# Patient Record
Sex: FEMALE | Race: WHITE | Employment: OTHER | URBAN - METROPOLITAN AREA
[De-identification: names, ages, dates, MRNs, and addresses within clinical notes are randomized per-mention and may not be internally consistent; named-entity substitution may affect disease eponyms.]

---

## 2018-03-03 ENCOUNTER — OFFICE VISIT (OUTPATIENT)
Dept: FAMILY MEDICINE CLINIC | Age: 73
End: 2018-03-03
Payer: MEDICARE

## 2018-03-03 VITALS
TEMPERATURE: 98.4 F | HEART RATE: 58 BPM | BODY MASS INDEX: 26.87 KG/M2 | WEIGHT: 146 LBS | OXYGEN SATURATION: 98 % | SYSTOLIC BLOOD PRESSURE: 122 MMHG | HEIGHT: 62 IN | DIASTOLIC BLOOD PRESSURE: 60 MMHG

## 2018-03-03 DIAGNOSIS — R30.0 DYSURIA: Primary | ICD-10-CM

## 2018-03-03 DIAGNOSIS — N30.91 HEMATURIA DUE TO CYSTITIS: ICD-10-CM

## 2018-03-03 LAB
BILIRUBIN, POC: ABNORMAL
BLOOD URINE, POC: ABNORMAL
CLARITY, POC: ABNORMAL
COLOR, POC: YELLOW
GLUCOSE URINE, POC: ABNORMAL
KETONES, POC: ABNORMAL
LEUKOCYTE EST, POC: ABNORMAL
NITRITE, POC: ABNORMAL
PH, POC: 6
PROTEIN, POC: 0.15
SPECIFIC GRAVITY, POC: 1.02
UROBILINOGEN, POC: ABNORMAL

## 2018-03-03 PROCEDURE — 81003 URINALYSIS AUTO W/O SCOPE: CPT | Performed by: NURSE PRACTITIONER

## 2018-03-03 PROCEDURE — 99212 OFFICE O/P EST SF 10 MIN: CPT | Performed by: NURSE PRACTITIONER

## 2018-03-03 RX ORDER — PHENAZOPYRIDINE HYDROCHLORIDE 100 MG/1
100 TABLET, FILM COATED ORAL 3 TIMES DAILY PRN
Qty: 9 TABLET | Refills: 0 | Status: SHIPPED | OUTPATIENT
Start: 2018-03-03 | End: 2018-04-09 | Stop reason: ALTCHOICE

## 2018-03-03 RX ORDER — CIPROFLOXACIN 250 MG/1
250 TABLET, FILM COATED ORAL 2 TIMES DAILY
Qty: 14 TABLET | Refills: 0 | Status: SHIPPED | OUTPATIENT
Start: 2018-03-03 | End: 2018-03-10

## 2018-03-03 ASSESSMENT — ENCOUNTER SYMPTOMS
EYE REDNESS: 0
SHORTNESS OF BREATH: 0
SINUS PRESSURE: 0
WHEEZING: 0
SORE THROAT: 0
NAUSEA: 0
EYE DISCHARGE: 0
BACK PAIN: 0
COUGH: 0
DIARRHEA: 0
ABDOMINAL PAIN: 0
FACIAL SWELLING: 0
CONSTIPATION: 0
ABDOMINAL DISTENTION: 0
CHEST TIGHTNESS: 0

## 2018-03-06 LAB
ORGANISM: ABNORMAL
URINE CULTURE, ROUTINE: ABNORMAL
URINE CULTURE, ROUTINE: ABNORMAL

## 2018-03-20 ENCOUNTER — OFFICE VISIT (OUTPATIENT)
Dept: UROLOGY | Age: 73
End: 2018-03-20
Payer: MEDICARE

## 2018-03-20 VITALS
DIASTOLIC BLOOD PRESSURE: 84 MMHG | WEIGHT: 143 LBS | BODY MASS INDEX: 27 KG/M2 | HEIGHT: 61 IN | SYSTOLIC BLOOD PRESSURE: 134 MMHG | HEART RATE: 53 BPM

## 2018-03-20 DIAGNOSIS — N39.0 RECURRENT UTI: Primary | ICD-10-CM

## 2018-03-20 DIAGNOSIS — R33.9 URINARY RETENTION: ICD-10-CM

## 2018-03-20 LAB
BILIRUBIN, POC: ABNORMAL
BLOOD URINE, POC: ABNORMAL
CLARITY, POC: CLEAR
COLOR, POC: YELLOW
GLUCOSE URINE, POC: ABNORMAL
KETONES, POC: ABNORMAL
LEUKOCYTE EST, POC: ABNORMAL
NITRITE, POC: ABNORMAL
PH, POC: 6.5
POST VOID RESIDUAL (PVR): 34 ML
PROTEIN, POC: ABNORMAL
SPECIFIC GRAVITY, POC: >=1.03
UROBILINOGEN, POC: 0.2

## 2018-03-20 PROCEDURE — G8399 PT W/DXA RESULTS DOCUMENT: HCPCS | Performed by: UROLOGY

## 2018-03-20 PROCEDURE — 99203 OFFICE O/P NEW LOW 30 MIN: CPT | Performed by: UROLOGY

## 2018-03-20 PROCEDURE — 3014F SCREEN MAMMO DOC REV: CPT | Performed by: UROLOGY

## 2018-03-20 PROCEDURE — 3017F COLORECTAL CA SCREEN DOC REV: CPT | Performed by: UROLOGY

## 2018-03-20 PROCEDURE — 1090F PRES/ABSN URINE INCON ASSESS: CPT | Performed by: UROLOGY

## 2018-03-20 PROCEDURE — 1123F ACP DISCUSS/DSCN MKR DOCD: CPT | Performed by: UROLOGY

## 2018-03-20 PROCEDURE — G8427 DOCREV CUR MEDS BY ELIG CLIN: HCPCS | Performed by: UROLOGY

## 2018-03-20 PROCEDURE — 1036F TOBACCO NON-USER: CPT | Performed by: UROLOGY

## 2018-03-20 PROCEDURE — 81003 URINALYSIS AUTO W/O SCOPE: CPT | Performed by: UROLOGY

## 2018-03-20 PROCEDURE — 4040F PNEUMOC VAC/ADMIN/RCVD: CPT | Performed by: UROLOGY

## 2018-03-20 PROCEDURE — G8484 FLU IMMUNIZE NO ADMIN: HCPCS | Performed by: UROLOGY

## 2018-03-20 PROCEDURE — G8419 CALC BMI OUT NRM PARAM NOF/U: HCPCS | Performed by: UROLOGY

## 2018-03-20 PROCEDURE — 51798 US URINE CAPACITY MEASURE: CPT | Performed by: UROLOGY

## 2018-03-20 RX ORDER — SULFAMETHOXAZOLE AND TRIMETHOPRIM 800; 160 MG/1; MG/1
1 TABLET ORAL 2 TIMES DAILY
Qty: 20 TABLET | Refills: 0 | Status: SHIPPED | OUTPATIENT
Start: 2018-03-20 | End: 2018-03-30

## 2018-03-20 RX ORDER — SULFAMETHOXAZOLE AND TRIMETHOPRIM 800; 160 MG/1; MG/1
TABLET ORAL
Refills: 0 | COMMUNITY
Start: 2018-03-15 | End: 2018-04-09 | Stop reason: ALTCHOICE

## 2018-03-20 ASSESSMENT — PATIENT HEALTH QUESTIONNAIRE - PHQ9
1. LITTLE INTEREST OR PLEASURE IN DOING THINGS: 0
2. FEELING DOWN, DEPRESSED OR HOPELESS: 0
SUM OF ALL RESPONSES TO PHQ9 QUESTIONS 1 & 2: 0
SUM OF ALL RESPONSES TO PHQ QUESTIONS 1-9: 0

## 2018-03-22 LAB — URINE CULTURE, ROUTINE: NORMAL

## 2018-04-09 ENCOUNTER — HOSPITAL ENCOUNTER (OUTPATIENT)
Age: 73
Setting detail: OBSERVATION
Discharge: HOME | End: 2018-04-10
Attending: INTERNAL MEDICINE | Admitting: INTERNAL MEDICINE
Payer: MEDICARE

## 2018-04-09 ENCOUNTER — APPOINTMENT (OUTPATIENT)
Dept: CT IMAGING | Age: 73
End: 2018-04-09
Payer: MEDICARE

## 2018-04-09 DIAGNOSIS — K62.5 RECTAL BLEEDING: Primary | ICD-10-CM

## 2018-04-09 DIAGNOSIS — K52.9 COLITIS: ICD-10-CM

## 2018-04-09 LAB
ABO/RH: NORMAL
ALBUMIN SERPL-MCNC: 4.3 G/DL (ref 3.9–4.9)
ALP BLD-CCNC: 78 U/L (ref 40–130)
ALT SERPL-CCNC: 16 U/L (ref 0–33)
ANION GAP SERPL CALCULATED.3IONS-SCNC: 14 MEQ/L (ref 7–13)
ANTIBODY SCREEN: NORMAL
APTT: 22.9 SEC (ref 21.6–35.4)
AST SERPL-CCNC: 17 U/L (ref 0–35)
BASOPHILS ABSOLUTE: 0 K/UL (ref 0–0.2)
BASOPHILS RELATIVE PERCENT: 0.5 %
BILIRUB SERPL-MCNC: 1 MG/DL (ref 0–1.2)
BILIRUBIN URINE: NEGATIVE
BLOOD, URINE: NEGATIVE
BUN BLDV-MCNC: 17 MG/DL (ref 8–23)
CALCIUM SERPL-MCNC: 9.2 MG/DL (ref 8.6–10.2)
CHLORIDE BLD-SCNC: 97 MEQ/L (ref 98–107)
CLARITY: CLEAR
CO2: 29 MEQ/L (ref 22–29)
COLOR: YELLOW
CREAT SERPL-MCNC: 0.69 MG/DL (ref 0.5–0.9)
EOSINOPHILS ABSOLUTE: 0.1 K/UL (ref 0–0.7)
EOSINOPHILS RELATIVE PERCENT: 1 %
GFR AFRICAN AMERICAN: >60
GFR AFRICAN AMERICAN: >60
GFR NON-AFRICAN AMERICAN: >60
GFR NON-AFRICAN AMERICAN: >60
GLOBULIN: 2.5 G/DL (ref 2.3–3.5)
GLUCOSE BLD-MCNC: 144 MG/DL (ref 74–109)
GLUCOSE URINE: NEGATIVE MG/DL
HCT VFR BLD CALC: 40.7 % (ref 37–47)
HCT VFR BLD CALC: 42.1 % (ref 37–47)
HEMOGLOBIN: 13.7 G/DL (ref 12–16)
HEMOGLOBIN: 14.6 G/DL (ref 12–16)
INR BLD: 1
KETONES, URINE: NEGATIVE MG/DL
LACTIC ACID: 1.6 MMOL/L (ref 0.5–2.2)
LEUKOCYTE ESTERASE, URINE: NEGATIVE
LIPASE: 25 U/L (ref 13–60)
LYMPHOCYTES ABSOLUTE: 1.3 K/UL (ref 1–4.8)
LYMPHOCYTES RELATIVE PERCENT: 13.8 %
MCH RBC QN AUTO: 31.6 PG (ref 27–31.3)
MCHC RBC AUTO-ENTMCNC: 34.8 % (ref 33–37)
MCV RBC AUTO: 90.9 FL (ref 82–100)
MONOCYTES ABSOLUTE: 0.6 K/UL (ref 0.2–0.8)
MONOCYTES RELATIVE PERCENT: 6 %
NEUTROPHILS ABSOLUTE: 7.2 K/UL (ref 1.4–6.5)
NEUTROPHILS RELATIVE PERCENT: 78.7 %
NITRITE, URINE: NEGATIVE
PDW BLD-RTO: 13.4 % (ref 11.5–14.5)
PERFORMED ON: NORMAL
PH UA: 7.5 (ref 5–9)
PLATELET # BLD: 234 K/UL (ref 130–400)
POC CREATININE: 0.9 MG/DL (ref 0.6–1.2)
POC SAMPLE TYPE: NORMAL
POTASSIUM SERPL-SCNC: 3.4 MEQ/L (ref 3.5–5.1)
PROTEIN UA: NEGATIVE MG/DL
PROTHROMBIN TIME: 10.3 SEC (ref 8.1–13.7)
RBC # BLD: 4.63 M/UL (ref 4.2–5.4)
SODIUM BLD-SCNC: 140 MEQ/L (ref 132–144)
SPECIFIC GRAVITY UA: 1.06 (ref 1–1.03)
TOTAL PROTEIN: 6.8 G/DL (ref 6.4–8.1)
URINE REFLEX TO CULTURE: NORMAL
UROBILINOGEN, URINE: 0.2 E.U./DL
WBC # BLD: 9.1 K/UL (ref 4.8–10.8)

## 2018-04-09 PROCEDURE — 6360000002 HC RX W HCPCS: Performed by: PHYSICIAN ASSISTANT

## 2018-04-09 PROCEDURE — 96376 TX/PRO/DX INJ SAME DRUG ADON: CPT

## 2018-04-09 PROCEDURE — 86850 RBC ANTIBODY SCREEN: CPT

## 2018-04-09 PROCEDURE — 2500000003 HC RX 250 WO HCPCS: Performed by: PHYSICIAN ASSISTANT

## 2018-04-09 PROCEDURE — 81003 URINALYSIS AUTO W/O SCOPE: CPT

## 2018-04-09 PROCEDURE — 85610 PROTHROMBIN TIME: CPT

## 2018-04-09 PROCEDURE — 86901 BLOOD TYPING SEROLOGIC RH(D): CPT

## 2018-04-09 PROCEDURE — 96375 TX/PRO/DX INJ NEW DRUG ADDON: CPT

## 2018-04-09 PROCEDURE — 96366 THER/PROPH/DIAG IV INF ADDON: CPT

## 2018-04-09 PROCEDURE — 96365 THER/PROPH/DIAG IV INF INIT: CPT

## 2018-04-09 PROCEDURE — 85014 HEMATOCRIT: CPT

## 2018-04-09 PROCEDURE — G0378 HOSPITAL OBSERVATION PER HR: HCPCS

## 2018-04-09 PROCEDURE — 83690 ASSAY OF LIPASE: CPT

## 2018-04-09 PROCEDURE — 74177 CT ABD & PELVIS W/CONTRAST: CPT

## 2018-04-09 PROCEDURE — 6370000000 HC RX 637 (ALT 250 FOR IP): Performed by: INTERNAL MEDICINE

## 2018-04-09 PROCEDURE — 85018 HEMOGLOBIN: CPT

## 2018-04-09 PROCEDURE — 2580000003 HC RX 258: Performed by: SPECIALIST

## 2018-04-09 PROCEDURE — C9113 INJ PANTOPRAZOLE SODIUM, VIA: HCPCS | Performed by: PHYSICIAN ASSISTANT

## 2018-04-09 PROCEDURE — 2580000003 HC RX 258: Performed by: PHYSICIAN ASSISTANT

## 2018-04-09 PROCEDURE — 85730 THROMBOPLASTIN TIME PARTIAL: CPT

## 2018-04-09 PROCEDURE — 6370000000 HC RX 637 (ALT 250 FOR IP): Performed by: PHYSICIAN ASSISTANT

## 2018-04-09 PROCEDURE — 83605 ASSAY OF LACTIC ACID: CPT

## 2018-04-09 PROCEDURE — 6370000000 HC RX 637 (ALT 250 FOR IP): Performed by: SPECIALIST

## 2018-04-09 PROCEDURE — 6360000004 HC RX CONTRAST MEDICATION: Performed by: PHYSICIAN ASSISTANT

## 2018-04-09 PROCEDURE — 80053 COMPREHEN METABOLIC PANEL: CPT

## 2018-04-09 PROCEDURE — 96367 TX/PROPH/DG ADDL SEQ IV INF: CPT

## 2018-04-09 PROCEDURE — 99285 EMERGENCY DEPT VISIT HI MDM: CPT

## 2018-04-09 PROCEDURE — 85025 COMPLETE CBC W/AUTO DIFF WBC: CPT

## 2018-04-09 PROCEDURE — 86900 BLOOD TYPING SEROLOGIC ABO: CPT

## 2018-04-09 PROCEDURE — 36415 COLL VENOUS BLD VENIPUNCTURE: CPT

## 2018-04-09 RX ORDER — SODIUM CHLORIDE 0.9 % (FLUSH) 0.9 %
10 SYRINGE (ML) INJECTION EVERY 12 HOURS SCHEDULED
Status: DISCONTINUED | OUTPATIENT
Start: 2018-04-09 | End: 2018-04-10 | Stop reason: HOSPADM

## 2018-04-09 RX ORDER — HYDROCHLOROTHIAZIDE 12.5 MG/1
12.5 TABLET ORAL DAILY
Status: DISCONTINUED | OUTPATIENT
Start: 2018-04-09 | End: 2018-04-10 | Stop reason: HOSPADM

## 2018-04-09 RX ORDER — ACETAMINOPHEN 325 MG/1
650 TABLET ORAL EVERY 4 HOURS PRN
Status: DISCONTINUED | OUTPATIENT
Start: 2018-04-09 | End: 2018-04-10 | Stop reason: HOSPADM

## 2018-04-09 RX ORDER — METOPROLOL TARTRATE 50 MG/1
50 TABLET, FILM COATED ORAL 2 TIMES DAILY
Status: DISCONTINUED | OUTPATIENT
Start: 2018-04-09 | End: 2018-04-10 | Stop reason: HOSPADM

## 2018-04-09 RX ORDER — PRAVASTATIN SODIUM 20 MG
20 TABLET ORAL DAILY
Status: DISCONTINUED | OUTPATIENT
Start: 2018-04-09 | End: 2018-04-10 | Stop reason: HOSPADM

## 2018-04-09 RX ORDER — CIPROFLOXACIN 2 MG/ML
400 INJECTION, SOLUTION INTRAVENOUS ONCE
Status: DISCONTINUED | OUTPATIENT
Start: 2018-04-09 | End: 2018-04-10 | Stop reason: HOSPADM

## 2018-04-09 RX ORDER — SODIUM CHLORIDE 9 MG/ML
INJECTION, SOLUTION INTRAVENOUS CONTINUOUS
Status: DISCONTINUED | OUTPATIENT
Start: 2018-04-09 | End: 2018-04-10 | Stop reason: HOSPADM

## 2018-04-09 RX ORDER — CIPROFLOXACIN 2 MG/ML
400 INJECTION, SOLUTION INTRAVENOUS EVERY 12 HOURS
Status: DISCONTINUED | OUTPATIENT
Start: 2018-04-09 | End: 2018-04-10 | Stop reason: HOSPADM

## 2018-04-09 RX ORDER — MULTIVIT-MIN/IRON/FOLIC ACID/K 18-600-40
CAPSULE ORAL
COMMUNITY

## 2018-04-09 RX ORDER — SODIUM CHLORIDE 0.9 % (FLUSH) 0.9 %
10 SYRINGE (ML) INJECTION ONCE
Status: COMPLETED | OUTPATIENT
Start: 2018-04-09 | End: 2018-04-09

## 2018-04-09 RX ORDER — 0.9 % SODIUM CHLORIDE 0.9 %
10 VIAL (ML) INJECTION EVERY 12 HOURS SCHEDULED
Status: DISCONTINUED | OUTPATIENT
Start: 2018-04-09 | End: 2018-04-10 | Stop reason: HOSPADM

## 2018-04-09 RX ORDER — POTASSIUM BICARBONATE 25 MEQ/1
25 TABLET, EFFERVESCENT ORAL ONCE
Status: COMPLETED | OUTPATIENT
Start: 2018-04-09 | End: 2018-04-09

## 2018-04-09 RX ORDER — SODIUM CHLORIDE 0.9 % (FLUSH) 0.9 %
10 SYRINGE (ML) INJECTION PRN
Status: DISCONTINUED | OUTPATIENT
Start: 2018-04-09 | End: 2018-04-10 | Stop reason: HOSPADM

## 2018-04-09 RX ORDER — PANTOPRAZOLE SODIUM 40 MG/10ML
80 INJECTION, POWDER, LYOPHILIZED, FOR SOLUTION INTRAVENOUS DAILY
Status: DISCONTINUED | OUTPATIENT
Start: 2018-04-09 | End: 2018-04-09 | Stop reason: DRUGHIGH

## 2018-04-09 RX ORDER — METHYLPREDNISOLONE SODIUM SUCCINATE 40 MG/ML
40 INJECTION, POWDER, LYOPHILIZED, FOR SOLUTION INTRAMUSCULAR; INTRAVENOUS ONCE
Status: COMPLETED | OUTPATIENT
Start: 2018-04-09 | End: 2018-04-09

## 2018-04-09 RX ORDER — 0.9 % SODIUM CHLORIDE 0.9 %
10 VIAL (ML) INJECTION PRN
Status: DISCONTINUED | OUTPATIENT
Start: 2018-04-09 | End: 2018-04-10 | Stop reason: HOSPADM

## 2018-04-09 RX ORDER — ONDANSETRON 2 MG/ML
4 INJECTION INTRAMUSCULAR; INTRAVENOUS EVERY 6 HOURS PRN
Status: DISCONTINUED | OUTPATIENT
Start: 2018-04-09 | End: 2018-04-10 | Stop reason: HOSPADM

## 2018-04-09 RX ORDER — DILTIAZEM HYDROCHLORIDE 240 MG/1
240 CAPSULE, COATED, EXTENDED RELEASE ORAL DAILY
Status: DISCONTINUED | OUTPATIENT
Start: 2018-04-09 | End: 2018-04-10 | Stop reason: HOSPADM

## 2018-04-09 RX ORDER — SODIUM CHLORIDE 9 MG/ML
INJECTION, SOLUTION INTRAVENOUS CONTINUOUS
Status: DISCONTINUED | OUTPATIENT
Start: 2018-04-09 | End: 2018-04-10

## 2018-04-09 RX ADMIN — SODIUM CHLORIDE, PRESERVATIVE FREE 10 ML: 5 INJECTION INTRAVENOUS at 08:00

## 2018-04-09 RX ADMIN — ONDANSETRON HYDROCHLORIDE 4 MG: 2 INJECTION, SOLUTION INTRAMUSCULAR; INTRAVENOUS at 13:01

## 2018-04-09 RX ADMIN — SODIUM CHLORIDE: 9 INJECTION, SOLUTION INTRAVENOUS at 11:49

## 2018-04-09 RX ADMIN — SODIUM CHLORIDE: 9 INJECTION, SOLUTION INTRAVENOUS at 09:07

## 2018-04-09 RX ADMIN — IOPAMIDOL 100 ML: 755 INJECTION, SOLUTION INTRAVENOUS at 08:00

## 2018-04-09 RX ADMIN — CIPROFLOXACIN 400 MG: 2 INJECTION, SOLUTION INTRAVENOUS at 11:49

## 2018-04-09 RX ADMIN — PANTOPRAZOLE SODIUM 80 MG: 40 INJECTION, POWDER, FOR SOLUTION INTRAVENOUS at 07:46

## 2018-04-09 RX ADMIN — PRAVASTATIN SODIUM 20 MG: 20 TABLET ORAL at 21:00

## 2018-04-09 RX ADMIN — METOPROLOL TARTRATE 50 MG: 50 TABLET ORAL at 21:00

## 2018-04-09 RX ADMIN — DILTIAZEM HYDROCHLORIDE 240 MG: 240 CAPSULE, COATED, EXTENDED RELEASE ORAL at 21:00

## 2018-04-09 RX ADMIN — POLYETHYLENE GLYCOL-3350 AND ELECTROLYTES 2000 ML: 236; 6.74; 5.86; 2.97; 22.74 POWDER, FOR SOLUTION ORAL at 20:59

## 2018-04-09 RX ADMIN — METRONIDAZOLE 500 MG: 500 INJECTION, SOLUTION INTRAVENOUS at 09:07

## 2018-04-09 RX ADMIN — METHYLPREDNISOLONE SODIUM SUCCINATE 40 MG: 40 INJECTION, POWDER, FOR SOLUTION INTRAMUSCULAR; INTRAVENOUS at 11:50

## 2018-04-09 RX ADMIN — METRONIDAZOLE 500 MG: 500 INJECTION, SOLUTION INTRAVENOUS at 16:33

## 2018-04-09 RX ADMIN — Medication 10 ML: at 21:01

## 2018-04-09 RX ADMIN — PANTOPRAZOLE SODIUM 40 MG: 40 INJECTION, POWDER, FOR SOLUTION INTRAVENOUS at 11:50

## 2018-04-09 RX ADMIN — POTASSIUM BICARBONATE 25 MEQ: 25 TABLET, EFFERVESCENT ORAL at 11:53

## 2018-04-09 ASSESSMENT — ENCOUNTER SYMPTOMS
BLOOD IN STOOL: 1
COLOR CHANGE: 0
RHINORRHEA: 0
NAUSEA: 0
ABDOMINAL DISTENTION: 0
SHORTNESS OF BREATH: 0
COUGH: 0
EYE DISCHARGE: 0
VOMITING: 0
WHEEZING: 0
SORE THROAT: 0
CONSTIPATION: 0
STRIDOR: 0
ABDOMINAL PAIN: 1
DIARRHEA: 0
BACK PAIN: 0
RECTAL PAIN: 0

## 2018-04-09 ASSESSMENT — PAIN SCALES - GENERAL
PAINLEVEL_OUTOF10: 3
PAINLEVEL_OUTOF10: 6
PAINLEVEL_OUTOF10: 2

## 2018-04-09 ASSESSMENT — PAIN DESCRIPTION - PAIN TYPE
TYPE: ACUTE PAIN

## 2018-04-09 ASSESSMENT — PAIN DESCRIPTION - DESCRIPTORS
DESCRIPTORS: CRAMPING;DISCOMFORT;SQUEEZING
DESCRIPTORS: CRAMPING;DISCOMFORT

## 2018-04-09 ASSESSMENT — PAIN DESCRIPTION - ORIENTATION
ORIENTATION: LOWER
ORIENTATION: MID;LOWER

## 2018-04-09 ASSESSMENT — PAIN DESCRIPTION - ONSET
ONSET: ON-GOING
ONSET: SUDDEN

## 2018-04-09 ASSESSMENT — PAIN DESCRIPTION - FREQUENCY
FREQUENCY: CONTINUOUS
FREQUENCY: CONTINUOUS

## 2018-04-09 ASSESSMENT — PAIN DESCRIPTION - LOCATION
LOCATION: ABDOMEN
LOCATION: ABDOMEN

## 2018-04-10 ENCOUNTER — ANESTHESIA EVENT (OUTPATIENT)
Dept: OPERATING ROOM | Age: 73
End: 2018-04-10
Payer: MEDICARE

## 2018-04-10 ENCOUNTER — ANESTHESIA (OUTPATIENT)
Dept: OPERATING ROOM | Age: 73
End: 2018-04-10
Payer: MEDICARE

## 2018-04-10 VITALS
TEMPERATURE: 98.1 F | OXYGEN SATURATION: 99 % | SYSTOLIC BLOOD PRESSURE: 141 MMHG | BODY MASS INDEX: 27.75 KG/M2 | HEIGHT: 61 IN | HEART RATE: 67 BPM | RESPIRATION RATE: 17 BRPM | DIASTOLIC BLOOD PRESSURE: 57 MMHG | WEIGHT: 147 LBS

## 2018-04-10 VITALS
OXYGEN SATURATION: 100 % | DIASTOLIC BLOOD PRESSURE: 67 MMHG | RESPIRATION RATE: 20 BRPM | SYSTOLIC BLOOD PRESSURE: 157 MMHG

## 2018-04-10 LAB
ANION GAP SERPL CALCULATED.3IONS-SCNC: 11 MEQ/L (ref 7–13)
BASOPHILS ABSOLUTE: 0 K/UL (ref 0–0.2)
BASOPHILS RELATIVE PERCENT: 0.2 %
BUN BLDV-MCNC: 6 MG/DL (ref 8–23)
CALCIUM SERPL-MCNC: 8.1 MG/DL (ref 8.6–10.2)
CHLORIDE BLD-SCNC: 104 MEQ/L (ref 98–107)
CO2: 27 MEQ/L (ref 22–29)
CREAT SERPL-MCNC: 0.53 MG/DL (ref 0.5–0.9)
EOSINOPHILS ABSOLUTE: 0.1 K/UL (ref 0–0.7)
EOSINOPHILS RELATIVE PERCENT: 0.6 %
GFR AFRICAN AMERICAN: >60
GFR NON-AFRICAN AMERICAN: >60
GLUCOSE BLD-MCNC: 102 MG/DL (ref 74–109)
HCT VFR BLD CALC: 35.1 % (ref 37–47)
HEMOGLOBIN: 12.3 G/DL (ref 12–16)
LYMPHOCYTES ABSOLUTE: 1.2 K/UL (ref 1–4.8)
LYMPHOCYTES RELATIVE PERCENT: 13.4 %
MAGNESIUM: 1.8 MG/DL (ref 1.7–2.3)
MCH RBC QN AUTO: 32 PG (ref 27–31.3)
MCHC RBC AUTO-ENTMCNC: 35.1 % (ref 33–37)
MCV RBC AUTO: 91.2 FL (ref 82–100)
MONOCYTES ABSOLUTE: 0.7 K/UL (ref 0.2–0.8)
MONOCYTES RELATIVE PERCENT: 7.4 %
NEUTROPHILS ABSOLUTE: 7.2 K/UL (ref 1.4–6.5)
NEUTROPHILS RELATIVE PERCENT: 78.4 %
PDW BLD-RTO: 13.6 % (ref 11.5–14.5)
PLATELET # BLD: 197 K/UL (ref 130–400)
POTASSIUM REFLEX MAGNESIUM: 3.3 MEQ/L (ref 3.5–5.1)
RBC # BLD: 3.85 M/UL (ref 4.2–5.4)
SODIUM BLD-SCNC: 142 MEQ/L (ref 132–144)
WBC # BLD: 9.2 K/UL (ref 4.8–10.8)

## 2018-04-10 PROCEDURE — 2580000003 HC RX 258: Performed by: SPECIALIST

## 2018-04-10 PROCEDURE — 3700000001 HC ADD 15 MINUTES (ANESTHESIA): Performed by: SPECIALIST

## 2018-04-10 PROCEDURE — 6370000000 HC RX 637 (ALT 250 FOR IP): Performed by: SPECIALIST

## 2018-04-10 PROCEDURE — 83735 ASSAY OF MAGNESIUM: CPT

## 2018-04-10 PROCEDURE — 2500000003 HC RX 250 WO HCPCS: Performed by: NURSE ANESTHETIST, CERTIFIED REGISTERED

## 2018-04-10 PROCEDURE — 6370000000 HC RX 637 (ALT 250 FOR IP): Performed by: INTERNAL MEDICINE

## 2018-04-10 PROCEDURE — 96366 THER/PROPH/DIAG IV INF ADDON: CPT

## 2018-04-10 PROCEDURE — 3609027000 HC COLONOSCOPY: Performed by: SPECIALIST

## 2018-04-10 PROCEDURE — 96376 TX/PRO/DX INJ SAME DRUG ADON: CPT

## 2018-04-10 PROCEDURE — 80048 BASIC METABOLIC PNL TOTAL CA: CPT

## 2018-04-10 PROCEDURE — 6360000002 HC RX W HCPCS: Performed by: PHYSICIAN ASSISTANT

## 2018-04-10 PROCEDURE — 7100000000 HC PACU RECOVERY - FIRST 15 MIN: Performed by: SPECIALIST

## 2018-04-10 PROCEDURE — 7100000001 HC PACU RECOVERY - ADDTL 15 MIN: Performed by: SPECIALIST

## 2018-04-10 PROCEDURE — 2500000003 HC RX 250 WO HCPCS: Performed by: PHYSICIAN ASSISTANT

## 2018-04-10 PROCEDURE — 3700000000 HC ANESTHESIA ATTENDED CARE: Performed by: SPECIALIST

## 2018-04-10 PROCEDURE — 2580000003 HC RX 258: Performed by: INTERNAL MEDICINE

## 2018-04-10 PROCEDURE — 2580000003 HC RX 258: Performed by: PHYSICIAN ASSISTANT

## 2018-04-10 PROCEDURE — G0378 HOSPITAL OBSERVATION PER HR: HCPCS

## 2018-04-10 PROCEDURE — C9113 INJ PANTOPRAZOLE SODIUM, VIA: HCPCS | Performed by: PHYSICIAN ASSISTANT

## 2018-04-10 PROCEDURE — 6360000002 HC RX W HCPCS: Performed by: NURSE ANESTHETIST, CERTIFIED REGISTERED

## 2018-04-10 PROCEDURE — 85025 COMPLETE CBC W/AUTO DIFF WBC: CPT

## 2018-04-10 PROCEDURE — 87493 C DIFF AMPLIFIED PROBE: CPT

## 2018-04-10 PROCEDURE — 36415 COLL VENOUS BLD VENIPUNCTURE: CPT

## 2018-04-10 RX ORDER — LIDOCAINE HYDROCHLORIDE 20 MG/ML
INJECTION, SOLUTION INFILTRATION; PERINEURAL PRN
Status: DISCONTINUED | OUTPATIENT
Start: 2018-04-10 | End: 2018-04-10 | Stop reason: SDUPTHER

## 2018-04-10 RX ORDER — ONDANSETRON 2 MG/ML
4 INJECTION INTRAMUSCULAR; INTRAVENOUS
Status: DISCONTINUED | OUTPATIENT
Start: 2018-04-10 | End: 2018-04-10 | Stop reason: HOSPADM

## 2018-04-10 RX ORDER — PANTOPRAZOLE SODIUM 40 MG/10ML
40 INJECTION, POWDER, LYOPHILIZED, FOR SOLUTION INTRAVENOUS 2 TIMES DAILY
Status: DISCONTINUED | OUTPATIENT
Start: 2018-04-10 | End: 2018-04-10 | Stop reason: HOSPADM

## 2018-04-10 RX ORDER — 0.9 % SODIUM CHLORIDE 0.9 %
10 VIAL (ML) INJECTION DAILY
Status: DISCONTINUED | OUTPATIENT
Start: 2018-04-10 | End: 2018-04-10 | Stop reason: HOSPADM

## 2018-04-10 RX ORDER — CIPROFLOXACIN 500 MG/1
500 TABLET, FILM COATED ORAL 2 TIMES DAILY
Qty: 20 TABLET | Refills: 0 | Status: SHIPPED | OUTPATIENT
Start: 2018-04-10 | End: 2018-04-20

## 2018-04-10 RX ORDER — METRONIDAZOLE 500 MG/1
500 TABLET ORAL 3 TIMES DAILY
Qty: 30 TABLET | Refills: 0 | Status: SHIPPED | OUTPATIENT
Start: 2018-04-10 | End: 2018-04-10

## 2018-04-10 RX ORDER — POTASSIUM CHLORIDE 7.45 MG/ML
10 INJECTION INTRAVENOUS
Status: DISCONTINUED | OUTPATIENT
Start: 2018-04-10 | End: 2018-04-10

## 2018-04-10 RX ORDER — POTASSIUM CHLORIDE 20 MEQ/1
40 TABLET, EXTENDED RELEASE ORAL ONCE
Status: COMPLETED | OUTPATIENT
Start: 2018-04-10 | End: 2018-04-10

## 2018-04-10 RX ORDER — PROPOFOL 10 MG/ML
INJECTION, EMULSION INTRAVENOUS PRN
Status: DISCONTINUED | OUTPATIENT
Start: 2018-04-10 | End: 2018-04-10 | Stop reason: SDUPTHER

## 2018-04-10 RX ORDER — CIPROFLOXACIN 500 MG/1
500 TABLET, FILM COATED ORAL 2 TIMES DAILY
Qty: 20 TABLET | Refills: 0 | Status: SHIPPED | OUTPATIENT
Start: 2018-04-10 | End: 2018-04-10

## 2018-04-10 RX ORDER — METRONIDAZOLE 500 MG/1
500 TABLET ORAL 3 TIMES DAILY
Qty: 30 TABLET | Refills: 0 | Status: SHIPPED | OUTPATIENT
Start: 2018-04-10 | End: 2018-04-20

## 2018-04-10 RX ADMIN — SODIUM CHLORIDE: 9 INJECTION, SOLUTION INTRAVENOUS at 02:03

## 2018-04-10 RX ADMIN — PROPOFOL 20 MG: 10 INJECTION, EMULSION INTRAVENOUS at 13:18

## 2018-04-10 RX ADMIN — CIPROFLOXACIN 400 MG: 2 INJECTION, SOLUTION INTRAVENOUS at 02:03

## 2018-04-10 RX ADMIN — PROPOFOL 20 MG: 10 INJECTION, EMULSION INTRAVENOUS at 13:16

## 2018-04-10 RX ADMIN — PROPOFOL 40 MG: 10 INJECTION, EMULSION INTRAVENOUS at 13:08

## 2018-04-10 RX ADMIN — PROPOFOL 20 MG: 10 INJECTION, EMULSION INTRAVENOUS at 13:14

## 2018-04-10 RX ADMIN — METOPROLOL TARTRATE 50 MG: 50 TABLET ORAL at 08:32

## 2018-04-10 RX ADMIN — CIPROFLOXACIN 400 MG: 2 INJECTION, SOLUTION INTRAVENOUS at 15:04

## 2018-04-10 RX ADMIN — HYDROCHLOROTHIAZIDE 12.5 MG: 12.5 TABLET ORAL at 15:04

## 2018-04-10 RX ADMIN — PROPOFOL 20 MG: 10 INJECTION, EMULSION INTRAVENOUS at 13:10

## 2018-04-10 RX ADMIN — SODIUM CHLORIDE: 9 INJECTION, SOLUTION INTRAVENOUS at 13:06

## 2018-04-10 RX ADMIN — Medication 10 ML: at 08:37

## 2018-04-10 RX ADMIN — PANTOPRAZOLE SODIUM 40 MG: 40 INJECTION, POWDER, FOR SOLUTION INTRAVENOUS at 08:31

## 2018-04-10 RX ADMIN — Medication 10 ML: at 08:31

## 2018-04-10 RX ADMIN — ONDANSETRON HYDROCHLORIDE 4 MG: 2 INJECTION, SOLUTION INTRAMUSCULAR; INTRAVENOUS at 00:22

## 2018-04-10 RX ADMIN — METRONIDAZOLE 500 MG: 500 INJECTION, SOLUTION INTRAVENOUS at 11:38

## 2018-04-10 RX ADMIN — METRONIDAZOLE 500 MG: 500 INJECTION, SOLUTION INTRAVENOUS at 03:58

## 2018-04-10 RX ADMIN — POTASSIUM CHLORIDE 40 MEQ: 20 TABLET, EXTENDED RELEASE ORAL at 14:11

## 2018-04-10 RX ADMIN — Medication 10 ML: at 00:51

## 2018-04-10 RX ADMIN — PANTOPRAZOLE SODIUM 40 MG: 40 INJECTION, POWDER, FOR SOLUTION INTRAVENOUS at 00:50

## 2018-04-10 RX ADMIN — LIDOCAINE HYDROCHLORIDE 20 MG: 20 INJECTION, SOLUTION INFILTRATION; PERINEURAL at 13:08

## 2018-04-10 RX ADMIN — PROPOFOL 20 MG: 10 INJECTION, EMULSION INTRAVENOUS at 13:12

## 2018-04-10 RX ADMIN — PROPOFOL 20 MG: 10 INJECTION, EMULSION INTRAVENOUS at 13:20

## 2018-04-10 RX ADMIN — POLYETHYLENE GLYCOL-3350 AND ELECTROLYTES 2000 ML: 236; 6.74; 5.86; 2.97; 22.74 POWDER, FOR SOLUTION ORAL at 06:39

## 2018-04-10 ASSESSMENT — PAIN SCALES - GENERAL
PAINLEVEL_OUTOF10: 0
PAINLEVEL_OUTOF10: 0

## 2018-04-10 ASSESSMENT — PAIN - FUNCTIONAL ASSESSMENT: PAIN_FUNCTIONAL_ASSESSMENT: 0-10

## 2018-04-10 ASSESSMENT — PULMONARY FUNCTION TESTS
PIF_VALUE: 1

## 2018-04-11 ENCOUNTER — TELEPHONE (OUTPATIENT)
Dept: UROLOGY | Age: 73
End: 2018-04-11

## 2018-04-11 LAB — CLOSTRIDIUM DIFFICILE DNA AMPLIFICATION: NORMAL

## 2018-04-17 ENCOUNTER — OFFICE VISIT (OUTPATIENT)
Dept: UROLOGY | Age: 73
End: 2018-04-17
Payer: MEDICARE

## 2018-04-17 VITALS
HEIGHT: 60 IN | DIASTOLIC BLOOD PRESSURE: 64 MMHG | BODY MASS INDEX: 28.07 KG/M2 | SYSTOLIC BLOOD PRESSURE: 120 MMHG | HEART RATE: 56 BPM | WEIGHT: 143 LBS

## 2018-04-17 DIAGNOSIS — R31.29 MICROHEMATURIA: Primary | ICD-10-CM

## 2018-04-17 LAB
BILIRUBIN, POC: ABNORMAL
BLOOD URINE, POC: ABNORMAL
CLARITY, POC: CLEAR
COLOR, POC: YELLOW
GLUCOSE URINE, POC: ABNORMAL
KETONES, POC: ABNORMAL
LEUKOCYTE EST, POC: ABNORMAL
NITRITE, POC: POSITIVE
PH, POC: 6
PROTEIN, POC: ABNORMAL
SPECIFIC GRAVITY, POC: >=1.03
UROBILINOGEN, POC: 0.2

## 2018-04-17 PROCEDURE — 1090F PRES/ABSN URINE INCON ASSESS: CPT | Performed by: UROLOGY

## 2018-04-17 PROCEDURE — 4040F PNEUMOC VAC/ADMIN/RCVD: CPT | Performed by: UROLOGY

## 2018-04-17 PROCEDURE — 1036F TOBACCO NON-USER: CPT | Performed by: UROLOGY

## 2018-04-17 PROCEDURE — G8427 DOCREV CUR MEDS BY ELIG CLIN: HCPCS | Performed by: UROLOGY

## 2018-04-17 PROCEDURE — 1123F ACP DISCUSS/DSCN MKR DOCD: CPT | Performed by: UROLOGY

## 2018-04-17 PROCEDURE — 3017F COLORECTAL CA SCREEN DOC REV: CPT | Performed by: UROLOGY

## 2018-04-17 PROCEDURE — 81003 URINALYSIS AUTO W/O SCOPE: CPT | Performed by: UROLOGY

## 2018-04-17 PROCEDURE — G8399 PT W/DXA RESULTS DOCUMENT: HCPCS | Performed by: UROLOGY

## 2018-04-17 PROCEDURE — 3014F SCREEN MAMMO DOC REV: CPT | Performed by: UROLOGY

## 2018-04-17 PROCEDURE — 99212 OFFICE O/P EST SF 10 MIN: CPT | Performed by: UROLOGY

## 2018-04-17 PROCEDURE — G8419 CALC BMI OUT NRM PARAM NOF/U: HCPCS | Performed by: UROLOGY

## 2018-04-17 RX ORDER — ONDANSETRON 4 MG/1
TABLET, ORALLY DISINTEGRATING ORAL
Refills: 0 | COMMUNITY
Start: 2018-04-13 | End: 2018-09-27

## 2018-04-19 LAB — URINE CULTURE, ROUTINE: NORMAL

## 2018-05-02 ENCOUNTER — NURSE ONLY (OUTPATIENT)
Dept: UROLOGY | Age: 73
End: 2018-05-02
Payer: MEDICARE

## 2018-05-02 ENCOUNTER — TELEPHONE (OUTPATIENT)
Dept: UROLOGY | Age: 73
End: 2018-05-02

## 2018-05-02 DIAGNOSIS — R31.0 GROSS HEMATURIA: Primary | ICD-10-CM

## 2018-05-02 LAB
BILIRUBIN, POC: ABNORMAL
BLOOD URINE, POC: ABNORMAL
CLARITY, POC: CLEAR
COLOR, POC: ABNORMAL
GLUCOSE URINE, POC: ABNORMAL
KETONES, POC: 15
LEUKOCYTE EST, POC: ABNORMAL
NITRITE, POC: POSITIVE
PH, POC: 6
PROTEIN, POC: ABNORMAL
SPECIFIC GRAVITY, POC: 1.02
UROBILINOGEN, POC: 1

## 2018-05-02 PROCEDURE — 81003 URINALYSIS AUTO W/O SCOPE: CPT | Performed by: UROLOGY

## 2018-05-04 LAB — URINE CULTURE, ROUTINE: NORMAL

## 2018-05-18 ENCOUNTER — OFFICE VISIT (OUTPATIENT)
Dept: UROLOGY | Age: 73
End: 2018-05-18
Payer: MEDICARE

## 2018-05-18 VITALS
HEART RATE: 51 BPM | BODY MASS INDEX: 25.86 KG/M2 | DIASTOLIC BLOOD PRESSURE: 60 MMHG | SYSTOLIC BLOOD PRESSURE: 122 MMHG | HEIGHT: 61 IN | WEIGHT: 137 LBS

## 2018-05-18 DIAGNOSIS — R31.29 MICROHEMATURIA: Primary | ICD-10-CM

## 2018-05-18 LAB
BILIRUBIN, POC: ABNORMAL
BLOOD URINE, POC: ABNORMAL
CLARITY, POC: CLEAR
COLOR, POC: YELLOW
GLUCOSE URINE, POC: ABNORMAL
KETONES, POC: ABNORMAL
LEUKOCYTE EST, POC: ABNORMAL
NITRITE, POC: ABNORMAL
PH, POC: 6.5
PROTEIN, POC: ABNORMAL
SPECIFIC GRAVITY, POC: 1.02
UROBILINOGEN, POC: 0.2

## 2018-05-18 PROCEDURE — 99214 OFFICE O/P EST MOD 30 MIN: CPT | Performed by: UROLOGY

## 2018-05-18 PROCEDURE — G8419 CALC BMI OUT NRM PARAM NOF/U: HCPCS | Performed by: UROLOGY

## 2018-05-18 PROCEDURE — 1123F ACP DISCUSS/DSCN MKR DOCD: CPT | Performed by: UROLOGY

## 2018-05-18 PROCEDURE — 1090F PRES/ABSN URINE INCON ASSESS: CPT | Performed by: UROLOGY

## 2018-05-18 PROCEDURE — 1036F TOBACCO NON-USER: CPT | Performed by: UROLOGY

## 2018-05-18 PROCEDURE — 81003 URINALYSIS AUTO W/O SCOPE: CPT | Performed by: UROLOGY

## 2018-05-18 PROCEDURE — G8399 PT W/DXA RESULTS DOCUMENT: HCPCS | Performed by: UROLOGY

## 2018-05-18 PROCEDURE — G8427 DOCREV CUR MEDS BY ELIG CLIN: HCPCS | Performed by: UROLOGY

## 2018-05-18 PROCEDURE — 4040F PNEUMOC VAC/ADMIN/RCVD: CPT | Performed by: UROLOGY

## 2018-05-18 PROCEDURE — 3017F COLORECTAL CA SCREEN DOC REV: CPT | Performed by: UROLOGY

## 2018-05-18 RX ORDER — POTASSIUM CHLORIDE 750 MG/1
TABLET, EXTENDED RELEASE ORAL
Refills: 1 | COMMUNITY
Start: 2018-04-20 | End: 2018-09-27

## 2018-05-18 RX ORDER — SULFAMETHOXAZOLE AND TRIMETHOPRIM 800; 160 MG/1; MG/1
1 TABLET ORAL 2 TIMES DAILY
Qty: 60 TABLET | Refills: 0 | Status: SHIPPED | OUTPATIENT
Start: 2018-05-18 | End: 2018-06-01

## 2018-06-01 ENCOUNTER — PROCEDURE VISIT (OUTPATIENT)
Dept: UROLOGY | Age: 73
End: 2018-06-01

## 2018-06-01 ENCOUNTER — TELEPHONE (OUTPATIENT)
Dept: UROLOGY | Age: 73
End: 2018-06-01

## 2018-06-01 VITALS
HEART RATE: 63 BPM | WEIGHT: 137 LBS | SYSTOLIC BLOOD PRESSURE: 120 MMHG | DIASTOLIC BLOOD PRESSURE: 66 MMHG | BODY MASS INDEX: 25.86 KG/M2 | HEIGHT: 61 IN

## 2018-06-01 DIAGNOSIS — N39.0 RECURRENT UTI: Primary | ICD-10-CM

## 2018-06-01 DIAGNOSIS — N39.0 RECURRENT UTI: ICD-10-CM

## 2018-06-01 DIAGNOSIS — R31.29 MICROHEMATURIA: Primary | ICD-10-CM

## 2018-06-04 LAB
ORGANISM: ABNORMAL
URINE CULTURE, ROUTINE: ABNORMAL
URINE CULTURE, ROUTINE: ABNORMAL

## 2018-06-04 RX ORDER — CIPROFLOXACIN 500 MG/1
500 TABLET, FILM COATED ORAL 2 TIMES DAILY
Qty: 14 TABLET | Refills: 0 | Status: SHIPPED | OUTPATIENT
Start: 2018-06-04 | End: 2018-08-16

## 2018-06-12 ENCOUNTER — TELEPHONE (OUTPATIENT)
Dept: UROLOGY | Age: 73
End: 2018-06-12

## 2018-08-06 ENCOUNTER — TELEPHONE (OUTPATIENT)
Dept: UROLOGY | Age: 73
End: 2018-08-06

## 2018-08-06 ENCOUNTER — NURSE ONLY (OUTPATIENT)
Dept: UROLOGY | Age: 73
End: 2018-08-06
Payer: MEDICARE

## 2018-08-06 DIAGNOSIS — R30.0 DYSURIA: ICD-10-CM

## 2018-08-06 DIAGNOSIS — R30.0 DYSURIA: Primary | ICD-10-CM

## 2018-08-06 LAB
BILIRUBIN, POC: ABNORMAL
BLOOD URINE, POC: ABNORMAL
CLARITY, POC: ABNORMAL
COLOR, POC: YELLOW
GLUCOSE URINE, POC: ABNORMAL
KETONES, POC: ABNORMAL
LEUKOCYTE EST, POC: ABNORMAL
NITRITE, POC: POSITIVE
PH, POC: 6
PROTEIN, POC: ABNORMAL
SPECIFIC GRAVITY, POC: 1.02
UROBILINOGEN, POC: 0.2

## 2018-08-06 PROCEDURE — 81003 URINALYSIS AUTO W/O SCOPE: CPT | Performed by: UROLOGY

## 2018-08-06 RX ORDER — SULFAMETHOXAZOLE AND TRIMETHOPRIM 800; 160 MG/1; MG/1
1 TABLET ORAL 2 TIMES DAILY
Qty: 20 TABLET | Refills: 0 | Status: SHIPPED | OUTPATIENT
Start: 2018-08-06 | End: 2018-08-16

## 2018-08-09 LAB
ORGANISM: ABNORMAL
URINE CULTURE, ROUTINE: ABNORMAL
URINE CULTURE, ROUTINE: ABNORMAL

## 2018-08-16 ENCOUNTER — PROCEDURE VISIT (OUTPATIENT)
Dept: UROLOGY | Age: 73
End: 2018-08-16
Payer: MEDICARE

## 2018-08-16 VITALS
HEART RATE: 54 BPM | HEIGHT: 60 IN | DIASTOLIC BLOOD PRESSURE: 60 MMHG | BODY MASS INDEX: 26.7 KG/M2 | WEIGHT: 136 LBS | SYSTOLIC BLOOD PRESSURE: 138 MMHG

## 2018-08-16 DIAGNOSIS — N81.9 FEMALE GENITAL PROLAPSE, UNSPECIFIED TYPE: ICD-10-CM

## 2018-08-16 DIAGNOSIS — R31.29 MICROHEMATURIA: Primary | ICD-10-CM

## 2018-08-16 DIAGNOSIS — N39.0 RECURRENT UTI: ICD-10-CM

## 2018-08-16 LAB
BILIRUBIN, POC: ABNORMAL
BLOOD URINE, POC: ABNORMAL
CLARITY, POC: CLEAR
COLOR, POC: YELLOW
GLUCOSE URINE, POC: ABNORMAL
KETONES, POC: ABNORMAL
LEUKOCYTE EST, POC: ABNORMAL
NITRITE, POC: ABNORMAL
PH, POC: 7
PROTEIN, POC: ABNORMAL
SPECIFIC GRAVITY, POC: 1.02
UROBILINOGEN, POC: 0.2

## 2018-08-16 PROCEDURE — 81003 URINALYSIS AUTO W/O SCOPE: CPT | Performed by: UROLOGY

## 2018-08-16 PROCEDURE — 52000 CYSTOURETHROSCOPY: CPT | Performed by: UROLOGY

## 2018-08-16 PROCEDURE — 99999 PR OFFICE/OUTPT VISIT,PROCEDURE ONLY: CPT | Performed by: UROLOGY

## 2018-08-24 ENCOUNTER — OFFICE VISIT (OUTPATIENT)
Dept: OBGYN CLINIC | Age: 73
End: 2018-08-24
Payer: MEDICARE

## 2018-08-24 VITALS
HEART RATE: 52 BPM | SYSTOLIC BLOOD PRESSURE: 136 MMHG | BODY MASS INDEX: 26.31 KG/M2 | DIASTOLIC BLOOD PRESSURE: 70 MMHG | WEIGHT: 134 LBS | HEIGHT: 60 IN

## 2018-08-24 DIAGNOSIS — N81.3 COMPLETE UTERINE PROLAPSE: Primary | ICD-10-CM

## 2018-08-24 DIAGNOSIS — N39.0 RECURRENT UTI: ICD-10-CM

## 2018-08-24 DIAGNOSIS — N81.6 BADEN-WALKER GRADE 2 RECTOCELE: ICD-10-CM

## 2018-08-24 DIAGNOSIS — N81.10 BADEN-WALKER GRADE 4 CYSTOCELE: ICD-10-CM

## 2018-08-24 PROCEDURE — 1123F ACP DISCUSS/DSCN MKR DOCD: CPT | Performed by: OBSTETRICS & GYNECOLOGY

## 2018-08-24 PROCEDURE — 1036F TOBACCO NON-USER: CPT | Performed by: OBSTETRICS & GYNECOLOGY

## 2018-08-24 PROCEDURE — 99203 OFFICE O/P NEW LOW 30 MIN: CPT | Performed by: OBSTETRICS & GYNECOLOGY

## 2018-08-24 PROCEDURE — G8399 PT W/DXA RESULTS DOCUMENT: HCPCS | Performed by: OBSTETRICS & GYNECOLOGY

## 2018-08-24 PROCEDURE — G8427 DOCREV CUR MEDS BY ELIG CLIN: HCPCS | Performed by: OBSTETRICS & GYNECOLOGY

## 2018-08-24 PROCEDURE — G8419 CALC BMI OUT NRM PARAM NOF/U: HCPCS | Performed by: OBSTETRICS & GYNECOLOGY

## 2018-08-24 PROCEDURE — 1101F PT FALLS ASSESS-DOCD LE1/YR: CPT | Performed by: OBSTETRICS & GYNECOLOGY

## 2018-08-24 PROCEDURE — 3017F COLORECTAL CA SCREEN DOC REV: CPT | Performed by: OBSTETRICS & GYNECOLOGY

## 2018-08-24 PROCEDURE — 1090F PRES/ABSN URINE INCON ASSESS: CPT | Performed by: OBSTETRICS & GYNECOLOGY

## 2018-08-24 PROCEDURE — 4040F PNEUMOC VAC/ADMIN/RCVD: CPT | Performed by: OBSTETRICS & GYNECOLOGY

## 2018-08-24 NOTE — Clinical Note
Schedule for : Laparoscopic hysterectomy with possible USLS/ SSLS , A-P repair with prophylactic TOT , Obtryx mesh from [address] [place] [address].

## 2018-08-24 NOTE — PROGRESS NOTES
Yamile Colmenares is a 74 y.o. female who presents here today for complaints of recurrent UTI . Underwent cystoscopy and was referred by Dr Joyce Márquez for evaluation. Patient with history of uterine prolapse. Patient feels pressure between legs. Vitals:  /70 (Site: Right Arm, Position: Sitting, Cuff Size: Medium Adult)   Pulse 52   Ht 5' (1.524 m)   Wt 134 lb (60.8 kg)   BMI 26.17 kg/m²   Allergies:  Latex  Past Medical History:   Diagnosis Date    Hyperlipidemia     Hypertension     Osteoarthritis     Prolapsed uterus      Past Surgical History:   Procedure Laterality Date    BREAST SURGERY      FRACTURE SURGERY      [place] COLONOSCOPY W/BIOPSY SINGLE/MULTIPLE N/A 4/10/2018    COLONOSCOPY WITH BIOPSYS performed by Joni Jeffrey MD at [facility]     OB History     No data available        Family History   Problem Relation Age of Onset    High Blood Pressure Mother     Asthma Brother      Social History     Social History    Marital status:      Spouse name: N/A    Number of children: N/A    Years of education: N/A     Occupational History    Not on file. Social History Main Topics    Smoking status: Never Smoker    Smokeless tobacco: Never Used    Alcohol use No    Drug use: No    Sexual activity: Not on file      Comment:      Other Topics Concern    Not on file     Social History Narrative    No narrative on file       Contraceptive method:  none    Patient's medications, allergies, past medical, surgical, social and family histories were reviewed and updated as appropriate. Review of Systems  As per chief complaint   All other systems reviewed and are negative. Physical Exam:  Vitals:  /70 (Site: Right Arm, Position: Sitting, Cuff Size: Medium Adult)   Pulse 52   Ht 5' (1.524 m)   Wt 134 lb (60.8 kg)   BMI 26.17 kg/m²   Lungs: CTAB   Heart : Regular S1/S2, no M/R/G  Abdomen: Soft , NT, ND , + BS   Pelvic exam :    Diagnosis Orders   1.  Complete uterine prolapse     2. Gran[place]-Walker grade 4 cystocele     3. [place]-Walker grade 2 rectocele     4. Recurrent UTI       + urine with coughing and valsalva after reduction of uterine prolapse. Assessment:      Diagnosis Orders   1. Complete uterine prolapse     2. Granville-Walker grade 4 cystocele     3. [place]-Walker grade 2 rectocele     4. Recurrent UTI         Plan:     Discussed pessary vs surgery . Patient wants surgery . To be scheduled for Laparoscopic hysterectomy with possible USLS/ SSLS , A-P repair with prophylactic TOT , Obtryx mesh from [address]. Discussed risks of failiure, infection , bleeding and injury of internal organs. As well as risks related to the mesh , including erosion and chronic pain. All questions answered. Patient to be scheduled after obtaining medical clearance from PCP on Monday . No orders of the defined types were placed in this encounter. No orders of the defined types were placed in this encounter. Follow Up:  No Follow-up on file.         Gisella Childs MD

## 2018-09-27 ENCOUNTER — HOSPITAL ENCOUNTER (OUTPATIENT)
Dept: PREADMISSION TESTING | Age: 73
Discharge: HOME | End: 2018-10-01
Payer: MEDICARE

## 2018-09-27 VITALS
HEART RATE: 49 BPM | BODY MASS INDEX: 26.93 KG/M2 | TEMPERATURE: 98.4 F | DIASTOLIC BLOOD PRESSURE: 69 MMHG | OXYGEN SATURATION: 96 % | HEIGHT: 60 IN | WEIGHT: 137.2 LBS | SYSTOLIC BLOOD PRESSURE: 159 MMHG | RESPIRATION RATE: 16 BRPM

## 2018-09-27 LAB
ABO/RH: NORMAL
ANION GAP SERPL CALCULATED.3IONS-SCNC: 14 MEQ/L (ref 7–13)
ANTIBODY SCREEN: NORMAL
BUN BLDV-MCNC: 13 MG/DL (ref 8–23)
CALCIUM SERPL-MCNC: 9.9 MG/DL (ref 8.6–10.2)
CHLORIDE BLD-SCNC: 98 MEQ/L (ref 98–107)
CO2: 29 MEQ/L (ref 22–29)
CREAT SERPL-MCNC: 0.65 MG/DL (ref 0.5–0.9)
EKG ATRIAL RATE: 48 BPM
EKG P AXIS: 43 DEGREES
EKG P-R INTERVAL: 168 MS
EKG Q-T INTERVAL: 444 MS
EKG QRS DURATION: 92 MS
EKG QTC CALCULATION (BAZETT): 396 MS
EKG R AXIS: 25 DEGREES
EKG T AXIS: 51 DEGREES
EKG VENTRICULAR RATE: 48 BPM
GFR AFRICAN AMERICAN: >60
GFR NON-AFRICAN AMERICAN: >60
GLUCOSE BLD-MCNC: 95 MG/DL (ref 74–109)
HCT VFR BLD CALC: 43.8 % (ref 37–47)
HEMOGLOBIN: 15.1 G/DL (ref 12–16)
MCH RBC QN AUTO: 31.7 PG (ref 27–31.3)
MCHC RBC AUTO-ENTMCNC: 34.5 % (ref 33–37)
MCV RBC AUTO: 91.8 FL (ref 82–100)
PDW BLD-RTO: 13.6 % (ref 11.5–14.5)
PLATELET # BLD: 276 K/UL (ref 130–400)
POTASSIUM SERPL-SCNC: 3.8 MEQ/L (ref 3.5–5.1)
RBC # BLD: 4.77 M/UL (ref 4.2–5.4)
SODIUM BLD-SCNC: 141 MEQ/L (ref 132–144)
WBC # BLD: 6.6 K/UL (ref 4.8–10.8)

## 2018-09-27 PROCEDURE — 80048 BASIC METABOLIC PNL TOTAL CA: CPT

## 2018-09-27 PROCEDURE — 86901 BLOOD TYPING SEROLOGIC RH(D): CPT

## 2018-09-27 PROCEDURE — 93005 ELECTROCARDIOGRAM TRACING: CPT

## 2018-09-27 PROCEDURE — 86850 RBC ANTIBODY SCREEN: CPT

## 2018-09-27 PROCEDURE — 86900 BLOOD TYPING SEROLOGIC ABO: CPT

## 2018-09-27 PROCEDURE — 85027 COMPLETE CBC AUTOMATED: CPT

## 2018-09-27 RX ORDER — SODIUM CHLORIDE, SODIUM LACTATE, POTASSIUM CHLORIDE, CALCIUM CHLORIDE 600; 310; 30; 20 MG/100ML; MG/100ML; MG/100ML; MG/100ML
INJECTION, SOLUTION INTRAVENOUS CONTINUOUS
Status: CANCELLED | OUTPATIENT
Start: 2018-10-04

## 2018-09-27 RX ORDER — SODIUM CHLORIDE 0.9 % (FLUSH) 0.9 %
10 SYRINGE (ML) INJECTION PRN
Status: CANCELLED | OUTPATIENT
Start: 2018-10-04

## 2018-09-27 RX ORDER — LIDOCAINE HYDROCHLORIDE 10 MG/ML
1 INJECTION, SOLUTION EPIDURAL; INFILTRATION; INTRACAUDAL; PERINEURAL
Status: CANCELLED | OUTPATIENT
Start: 2018-10-04 | End: 2018-10-04

## 2018-09-27 RX ORDER — SODIUM CHLORIDE 0.9 % (FLUSH) 0.9 %
10 SYRINGE (ML) INJECTION EVERY 12 HOURS SCHEDULED
Status: CANCELLED | OUTPATIENT
Start: 2018-10-04

## 2018-10-03 ENCOUNTER — ANESTHESIA EVENT (OUTPATIENT)
Dept: OPERATING ROOM | Age: 73
End: 2018-10-03
Payer: MEDICARE

## 2018-10-04 ENCOUNTER — HOSPITAL ENCOUNTER (OUTPATIENT)
Age: 73
Discharge: HOME | End: 2018-10-05
Attending: OBSTETRICS & GYNECOLOGY | Admitting: OBSTETRICS & GYNECOLOGY
Payer: MEDICARE

## 2018-10-04 ENCOUNTER — ANESTHESIA (OUTPATIENT)
Dept: OPERATING ROOM | Age: 73
End: 2018-10-04
Payer: MEDICARE

## 2018-10-04 VITALS
OXYGEN SATURATION: 100 % | TEMPERATURE: 95.4 F | SYSTOLIC BLOOD PRESSURE: 108 MMHG | RESPIRATION RATE: 15 BRPM | DIASTOLIC BLOOD PRESSURE: 52 MMHG

## 2018-10-04 DIAGNOSIS — G89.18 POSTOPERATIVE PAIN: Primary | ICD-10-CM

## 2018-10-04 PROBLEM — N81.4 UTERINE PROLAPSE: Status: ACTIVE | Noted: 2018-10-04

## 2018-10-04 PROCEDURE — 6360000002 HC RX W HCPCS: Performed by: ANESTHESIOLOGY

## 2018-10-04 PROCEDURE — 57260 CMBN ANT PST COLPRHY: CPT | Performed by: OBSTETRICS & GYNECOLOGY

## 2018-10-04 PROCEDURE — 2580000003 HC RX 258: Performed by: OBSTETRICS & GYNECOLOGY

## 2018-10-04 PROCEDURE — 3700000000 HC ANESTHESIA ATTENDED CARE: Performed by: OBSTETRICS & GYNECOLOGY

## 2018-10-04 PROCEDURE — 2580000003 HC RX 258: Performed by: NURSE PRACTITIONER

## 2018-10-04 PROCEDURE — 57282 COLPOPEXY EXTRAPERITONEAL: CPT | Performed by: OBSTETRICS & GYNECOLOGY

## 2018-10-04 PROCEDURE — 7100000000 HC PACU RECOVERY - FIRST 15 MIN: Performed by: OBSTETRICS & GYNECOLOGY

## 2018-10-04 PROCEDURE — 6370000000 HC RX 637 (ALT 250 FOR IP): Performed by: OBSTETRICS & GYNECOLOGY

## 2018-10-04 PROCEDURE — 2500000003 HC RX 250 WO HCPCS: Performed by: OBSTETRICS & GYNECOLOGY

## 2018-10-04 PROCEDURE — 3700000001 HC ADD 15 MINUTES (ANESTHESIA): Performed by: OBSTETRICS & GYNECOLOGY

## 2018-10-04 PROCEDURE — 2500000003 HC RX 250 WO HCPCS: Performed by: ANESTHESIOLOGY

## 2018-10-04 PROCEDURE — 57288 REPAIR BLADDER DEFECT: CPT | Performed by: OBSTETRICS & GYNECOLOGY

## 2018-10-04 PROCEDURE — C2631 REP DEV, URINARY, W/O SLING: HCPCS | Performed by: OBSTETRICS & GYNECOLOGY

## 2018-10-04 PROCEDURE — 88307 TISSUE EXAM BY PATHOLOGIST: CPT

## 2018-10-04 PROCEDURE — 6360000002 HC RX W HCPCS: Performed by: NURSE PRACTITIONER

## 2018-10-04 PROCEDURE — 2709999900 HC NON-CHARGEABLE SUPPLY: Performed by: OBSTETRICS & GYNECOLOGY

## 2018-10-04 PROCEDURE — 3600000014 HC SURGERY LEVEL 4 ADDTL 15MIN: Performed by: OBSTETRICS & GYNECOLOGY

## 2018-10-04 PROCEDURE — 94150 VITAL CAPACITY TEST: CPT

## 2018-10-04 PROCEDURE — C1771 REP DEV, URINARY, W/SLING: HCPCS | Performed by: OBSTETRICS & GYNECOLOGY

## 2018-10-04 PROCEDURE — 7100000001 HC PACU RECOVERY - ADDTL 15 MIN: Performed by: OBSTETRICS & GYNECOLOGY

## 2018-10-04 PROCEDURE — 2720000010 HC SURG SUPPLY STERILE: Performed by: OBSTETRICS & GYNECOLOGY

## 2018-10-04 PROCEDURE — 88302 TISSUE EXAM BY PATHOLOGIST: CPT

## 2018-10-04 PROCEDURE — 2500000003 HC RX 250 WO HCPCS: Performed by: NURSE ANESTHETIST, CERTIFIED REGISTERED

## 2018-10-04 PROCEDURE — 58571 TLH W/T/O 250 G OR LESS: CPT | Performed by: OBSTETRICS & GYNECOLOGY

## 2018-10-04 PROCEDURE — 3600000004 HC SURGERY LEVEL 4 BASE: Performed by: OBSTETRICS & GYNECOLOGY

## 2018-10-04 PROCEDURE — 6360000002 HC RX W HCPCS: Performed by: OBSTETRICS & GYNECOLOGY

## 2018-10-04 DEVICE — TRANSOBTURATOR MID-URETHRAL SYSTEM
Type: IMPLANTABLE DEVICE | Site: ABDOMEN | Status: FUNCTIONAL
Brand: OBTRYX™ SYSTEM - HALO

## 2018-10-04 RX ORDER — FENTANYL CITRATE 50 UG/ML
50 INJECTION, SOLUTION INTRAMUSCULAR; INTRAVENOUS EVERY 10 MIN PRN
Status: DISCONTINUED | OUTPATIENT
Start: 2018-10-04 | End: 2018-10-04 | Stop reason: HOSPADM

## 2018-10-04 RX ORDER — EPHEDRINE SULFATE 50 MG/ML
INJECTION, SOLUTION INTRAVENOUS PRN
Status: DISCONTINUED | OUTPATIENT
Start: 2018-10-04 | End: 2018-10-04 | Stop reason: SDUPTHER

## 2018-10-04 RX ORDER — ACETAMINOPHEN 325 MG/1
650 TABLET ORAL EVERY 4 HOURS PRN
Status: DISCONTINUED | OUTPATIENT
Start: 2018-10-04 | End: 2018-10-05 | Stop reason: HOSPADM

## 2018-10-04 RX ORDER — GINSENG 100 MG
CAPSULE ORAL PRN
Status: DISCONTINUED | OUTPATIENT
Start: 2018-10-04 | End: 2018-10-04 | Stop reason: HOSPADM

## 2018-10-04 RX ORDER — MORPHINE SULFATE 2 MG/ML
2 INJECTION, SOLUTION INTRAMUSCULAR; INTRAVENOUS
Status: DISCONTINUED | OUTPATIENT
Start: 2018-10-04 | End: 2018-10-05 | Stop reason: HOSPADM

## 2018-10-04 RX ORDER — ONDANSETRON 2 MG/ML
INJECTION INTRAMUSCULAR; INTRAVENOUS PRN
Status: DISCONTINUED | OUTPATIENT
Start: 2018-10-04 | End: 2018-10-04 | Stop reason: SDUPTHER

## 2018-10-04 RX ORDER — ONDANSETRON 2 MG/ML
4 INJECTION INTRAMUSCULAR; INTRAVENOUS
Status: DISCONTINUED | OUTPATIENT
Start: 2018-10-04 | End: 2018-10-04 | Stop reason: HOSPADM

## 2018-10-04 RX ORDER — PROPOFOL 10 MG/ML
INJECTION, EMULSION INTRAVENOUS PRN
Status: DISCONTINUED | OUTPATIENT
Start: 2018-10-04 | End: 2018-10-04 | Stop reason: SDUPTHER

## 2018-10-04 RX ORDER — OXYCODONE HYDROCHLORIDE AND ACETAMINOPHEN 5; 325 MG/1; MG/1
1 TABLET ORAL EVERY 4 HOURS PRN
Status: DISCONTINUED | OUTPATIENT
Start: 2018-10-04 | End: 2018-10-05 | Stop reason: HOSPADM

## 2018-10-04 RX ORDER — SODIUM CHLORIDE, SODIUM LACTATE, POTASSIUM CHLORIDE, CALCIUM CHLORIDE 600; 310; 30; 20 MG/100ML; MG/100ML; MG/100ML; MG/100ML
INJECTION, SOLUTION INTRAVENOUS
Status: COMPLETED
Start: 2018-10-04 | End: 2018-10-04

## 2018-10-04 RX ORDER — SODIUM CHLORIDE 9 MG/ML
INJECTION, SOLUTION INTRAVENOUS CONTINUOUS
Status: DISCONTINUED | OUTPATIENT
Start: 2018-10-04 | End: 2018-10-05 | Stop reason: HOSPADM

## 2018-10-04 RX ORDER — ONDANSETRON 2 MG/ML
4 INJECTION INTRAMUSCULAR; INTRAVENOUS EVERY 8 HOURS PRN
Status: DISCONTINUED | OUTPATIENT
Start: 2018-10-04 | End: 2018-10-05 | Stop reason: HOSPADM

## 2018-10-04 RX ORDER — DOCUSATE SODIUM 100 MG/1
100 CAPSULE, LIQUID FILLED ORAL 2 TIMES DAILY
Status: DISCONTINUED | OUTPATIENT
Start: 2018-10-04 | End: 2018-10-05 | Stop reason: HOSPADM

## 2018-10-04 RX ORDER — LIDOCAINE HYDROCHLORIDE 20 MG/ML
INJECTION, SOLUTION EPIDURAL; INFILTRATION; INTRACAUDAL; PERINEURAL PRN
Status: DISCONTINUED | OUTPATIENT
Start: 2018-10-04 | End: 2018-10-04 | Stop reason: SDUPTHER

## 2018-10-04 RX ORDER — DEXAMETHASONE SODIUM PHOSPHATE 4 MG/ML
INJECTION, SOLUTION INTRA-ARTICULAR; INTRALESIONAL; INTRAMUSCULAR; INTRAVENOUS; SOFT TISSUE PRN
Status: DISCONTINUED | OUTPATIENT
Start: 2018-10-04 | End: 2018-10-04 | Stop reason: SDUPTHER

## 2018-10-04 RX ORDER — ROCURONIUM BROMIDE 10 MG/ML
INJECTION, SOLUTION INTRAVENOUS PRN
Status: DISCONTINUED | OUTPATIENT
Start: 2018-10-04 | End: 2018-10-04 | Stop reason: SDUPTHER

## 2018-10-04 RX ORDER — SIMETHICONE 80 MG
80 TABLET,CHEWABLE ORAL EVERY 6 HOURS PRN
Status: DISCONTINUED | OUTPATIENT
Start: 2018-10-04 | End: 2018-10-05 | Stop reason: HOSPADM

## 2018-10-04 RX ORDER — FENTANYL CITRATE 50 UG/ML
INJECTION, SOLUTION INTRAMUSCULAR; INTRAVENOUS PRN
Status: DISCONTINUED | OUTPATIENT
Start: 2018-10-04 | End: 2018-10-04 | Stop reason: SDUPTHER

## 2018-10-04 RX ORDER — MEPERIDINE HYDROCHLORIDE 25 MG/ML
12.5 INJECTION INTRAMUSCULAR; INTRAVENOUS; SUBCUTANEOUS EVERY 5 MIN PRN
Status: DISCONTINUED | OUTPATIENT
Start: 2018-10-04 | End: 2018-10-04 | Stop reason: HOSPADM

## 2018-10-04 RX ORDER — METOCLOPRAMIDE HYDROCHLORIDE 5 MG/ML
10 INJECTION INTRAMUSCULAR; INTRAVENOUS
Status: DISCONTINUED | OUTPATIENT
Start: 2018-10-04 | End: 2018-10-04 | Stop reason: HOSPADM

## 2018-10-04 RX ORDER — BUPIVACAINE HYDROCHLORIDE AND EPINEPHRINE 5; 5 MG/ML; UG/ML
INJECTION, SOLUTION EPIDURAL; INTRACAUDAL; PERINEURAL PRN
Status: DISCONTINUED | OUTPATIENT
Start: 2018-10-04 | End: 2018-10-04 | Stop reason: HOSPADM

## 2018-10-04 RX ORDER — SODIUM CHLORIDE, SODIUM LACTATE, POTASSIUM CHLORIDE, CALCIUM CHLORIDE 600; 310; 30; 20 MG/100ML; MG/100ML; MG/100ML; MG/100ML
INJECTION, SOLUTION INTRAVENOUS CONTINUOUS
Status: DISCONTINUED | OUTPATIENT
Start: 2018-10-04 | End: 2018-10-04

## 2018-10-04 RX ORDER — MAGNESIUM HYDROXIDE 1200 MG/15ML
LIQUID ORAL CONTINUOUS PRN
Status: COMPLETED | OUTPATIENT
Start: 2018-10-04 | End: 2018-10-04

## 2018-10-04 RX ORDER — SODIUM CHLORIDE, SODIUM LACTATE, POTASSIUM CHLORIDE, CALCIUM CHLORIDE 600; 310; 30; 20 MG/100ML; MG/100ML; MG/100ML; MG/100ML
INJECTION, SOLUTION INTRAVENOUS
Status: DISCONTINUED
Start: 2018-10-04 | End: 2018-10-04

## 2018-10-04 RX ORDER — DIPHENHYDRAMINE HYDROCHLORIDE 50 MG/ML
12.5 INJECTION INTRAMUSCULAR; INTRAVENOUS
Status: DISCONTINUED | OUTPATIENT
Start: 2018-10-04 | End: 2018-10-04 | Stop reason: HOSPADM

## 2018-10-04 RX ORDER — FUROSEMIDE 10 MG/ML
INJECTION INTRAMUSCULAR; INTRAVENOUS PRN
Status: DISCONTINUED | OUTPATIENT
Start: 2018-10-04 | End: 2018-10-04 | Stop reason: SDUPTHER

## 2018-10-04 RX ORDER — SODIUM CHLORIDE 0.9 % (FLUSH) 0.9 %
10 SYRINGE (ML) INJECTION EVERY 12 HOURS SCHEDULED
Status: DISCONTINUED | OUTPATIENT
Start: 2018-10-04 | End: 2018-10-04 | Stop reason: HOSPADM

## 2018-10-04 RX ORDER — ACETAMINOPHEN 500 MG
1000 TABLET ORAL EVERY 6 HOURS PRN
Status: DISCONTINUED | OUTPATIENT
Start: 2018-10-04 | End: 2018-10-05 | Stop reason: HOSPADM

## 2018-10-04 RX ORDER — KETOROLAC TROMETHAMINE 30 MG/ML
30 INJECTION, SOLUTION INTRAMUSCULAR; INTRAVENOUS EVERY 6 HOURS
Status: DISCONTINUED | OUTPATIENT
Start: 2018-10-04 | End: 2018-10-05 | Stop reason: HOSPADM

## 2018-10-04 RX ORDER — ATROPINE SULFATE 0.1 MG/ML
INJECTION INTRAVENOUS PRN
Status: DISCONTINUED | OUTPATIENT
Start: 2018-10-04 | End: 2018-10-04 | Stop reason: SDUPTHER

## 2018-10-04 RX ORDER — LIDOCAINE HYDROCHLORIDE AND EPINEPHRINE 10; 10 MG/ML; UG/ML
INJECTION, SOLUTION INFILTRATION; PERINEURAL PRN
Status: DISCONTINUED | OUTPATIENT
Start: 2018-10-04 | End: 2018-10-04 | Stop reason: HOSPADM

## 2018-10-04 RX ORDER — MAGNESIUM HYDROXIDE/ALUMINUM HYDROXICE/SIMETHICONE 120; 1200; 1200 MG/30ML; MG/30ML; MG/30ML
30 SUSPENSION ORAL EVERY 6 HOURS PRN
Status: DISCONTINUED | OUTPATIENT
Start: 2018-10-04 | End: 2018-10-05 | Stop reason: HOSPADM

## 2018-10-04 RX ORDER — IBUPROFEN 600 MG/1
600 TABLET ORAL EVERY 6 HOURS PRN
Status: DISCONTINUED | OUTPATIENT
Start: 2018-10-06 | End: 2018-10-05 | Stop reason: HOSPADM

## 2018-10-04 RX ORDER — SODIUM CHLORIDE 0.9 % (FLUSH) 0.9 %
10 SYRINGE (ML) INJECTION PRN
Status: DISCONTINUED | OUTPATIENT
Start: 2018-10-04 | End: 2018-10-04 | Stop reason: HOSPADM

## 2018-10-04 RX ORDER — KETOROLAC TROMETHAMINE 30 MG/ML
INJECTION, SOLUTION INTRAMUSCULAR; INTRAVENOUS PRN
Status: DISCONTINUED | OUTPATIENT
Start: 2018-10-04 | End: 2018-10-04 | Stop reason: SDUPTHER

## 2018-10-04 RX ORDER — SODIUM CHLORIDE 0.9 % (FLUSH) 0.9 %
10 SYRINGE (ML) INJECTION EVERY 12 HOURS SCHEDULED
Status: DISCONTINUED | OUTPATIENT
Start: 2018-10-04 | End: 2018-10-05 | Stop reason: HOSPADM

## 2018-10-04 RX ORDER — MIDAZOLAM HYDROCHLORIDE 1 MG/ML
INJECTION INTRAMUSCULAR; INTRAVENOUS PRN
Status: DISCONTINUED | OUTPATIENT
Start: 2018-10-04 | End: 2018-10-04 | Stop reason: SDUPTHER

## 2018-10-04 RX ORDER — SODIUM CHLORIDE 0.9 % (FLUSH) 0.9 %
10 SYRINGE (ML) INJECTION PRN
Status: DISCONTINUED | OUTPATIENT
Start: 2018-10-04 | End: 2018-10-05 | Stop reason: HOSPADM

## 2018-10-04 RX ADMIN — HYDROMORPHONE HYDROCHLORIDE 0.5 MG: 1 INJECTION, SOLUTION INTRAMUSCULAR; INTRAVENOUS; SUBCUTANEOUS at 12:47

## 2018-10-04 RX ADMIN — ROCURONIUM BROMIDE 50 MG: 10 INJECTION INTRAVENOUS at 07:54

## 2018-10-04 RX ADMIN — HYDROMORPHONE HYDROCHLORIDE 0.5 MG: 1 INJECTION, SOLUTION INTRAMUSCULAR; INTRAVENOUS; SUBCUTANEOUS at 13:09

## 2018-10-04 RX ADMIN — HYDROMORPHONE HYDROCHLORIDE 0.5 MG: 1 INJECTION, SOLUTION INTRAMUSCULAR; INTRAVENOUS; SUBCUTANEOUS at 12:57

## 2018-10-04 RX ADMIN — FENTANYL CITRATE 50 MCG: 50 INJECTION, SOLUTION INTRAMUSCULAR; INTRAVENOUS at 07:54

## 2018-10-04 RX ADMIN — Medication 10 ML: at 22:30

## 2018-10-04 RX ADMIN — HYDROMORPHONE HYDROCHLORIDE 0.5 MG: 1 INJECTION, SOLUTION INTRAMUSCULAR; INTRAVENOUS; SUBCUTANEOUS at 12:33

## 2018-10-04 RX ADMIN — ONDANSETRON HYDROCHLORIDE 4 MG: 2 INJECTION, SOLUTION INTRAMUSCULAR; INTRAVENOUS at 10:15

## 2018-10-04 RX ADMIN — MIDAZOLAM HYDROCHLORIDE 2 MG: 1 INJECTION, SOLUTION INTRAMUSCULAR; INTRAVENOUS at 07:45

## 2018-10-04 RX ADMIN — DEXAMETHASONE SODIUM PHOSPHATE 10 MG: 4 INJECTION, SOLUTION INTRAMUSCULAR; INTRAVENOUS at 09:28

## 2018-10-04 RX ADMIN — DOCUSATE SODIUM 100 MG: 100 CAPSULE, LIQUID FILLED ORAL at 22:29

## 2018-10-04 RX ADMIN — KETOROLAC TROMETHAMINE 30 MG: 30 INJECTION, SOLUTION INTRAMUSCULAR at 22:30

## 2018-10-04 RX ADMIN — PROPOFOL 120 MG: 10 INJECTION, EMULSION INTRAVENOUS at 07:54

## 2018-10-04 RX ADMIN — LIDOCAINE HYDROCHLORIDE 40 MG: 20 INJECTION, SOLUTION EPIDURAL; INFILTRATION; INTRACAUDAL; PERINEURAL at 07:54

## 2018-10-04 RX ADMIN — FENTANYL CITRATE 50 MCG: 50 INJECTION, SOLUTION INTRAMUSCULAR; INTRAVENOUS at 09:31

## 2018-10-04 RX ADMIN — SODIUM CHLORIDE, POTASSIUM CHLORIDE, SODIUM LACTATE AND CALCIUM CHLORIDE 125 ML/HR: 600; 310; 30; 20 INJECTION, SOLUTION INTRAVENOUS at 06:30

## 2018-10-04 RX ADMIN — KETOROLAC TROMETHAMINE 15 MG: 30 INJECTION, SOLUTION INTRAMUSCULAR; INTRAVENOUS at 11:03

## 2018-10-04 RX ADMIN — EPHEDRINE SULFATE 10 MG: 50 INJECTION, SOLUTION INTRAMUSCULAR; INTRAVENOUS; SUBCUTANEOUS at 11:43

## 2018-10-04 RX ADMIN — INDIGO CARMINE 50 MG: 8 INJECTION, SOLUTION INTRAMUSCULAR; INTRAVENOUS at 10:18

## 2018-10-04 RX ADMIN — SODIUM CHLORIDE: 9 INJECTION, SOLUTION INTRAVENOUS at 14:41

## 2018-10-04 RX ADMIN — Medication 2 G: at 16:48

## 2018-10-04 RX ADMIN — FUROSEMIDE 5 MG: 10 INJECTION, SOLUTION INTRAVENOUS at 10:45

## 2018-10-04 RX ADMIN — KETOROLAC TROMETHAMINE 30 MG: 30 INJECTION, SOLUTION INTRAMUSCULAR at 15:36

## 2018-10-04 RX ADMIN — SODIUM CHLORIDE, POTASSIUM CHLORIDE, SODIUM LACTATE AND CALCIUM CHLORIDE: 600; 310; 30; 20 INJECTION, SOLUTION INTRAVENOUS at 10:55

## 2018-10-04 RX ADMIN — ATROPINE SULFATE 0.4 MG: 0.1 INJECTION PARENTERAL at 08:24

## 2018-10-04 RX ADMIN — Medication 2 G: at 08:03

## 2018-10-04 ASSESSMENT — PULMONARY FUNCTION TESTS
PIF_VALUE: 12
PIF_VALUE: 14
PIF_VALUE: 26
PIF_VALUE: 15
PIF_VALUE: 14
PIF_VALUE: 26
PIF_VALUE: 15
PIF_VALUE: 14
PIF_VALUE: 14
PIF_VALUE: 13
PIF_VALUE: 7
PIF_VALUE: 14
PIF_VALUE: 15
PIF_VALUE: 13
PIF_VALUE: 14
PIF_VALUE: 14
PIF_VALUE: 26
PIF_VALUE: 21
PIF_VALUE: 15
PIF_VALUE: 13
PIF_VALUE: 28
PIF_VALUE: 15
PIF_VALUE: 27
PIF_VALUE: 14
PIF_VALUE: 15
PIF_VALUE: 14
PIF_VALUE: 14
PIF_VALUE: 15
PIF_VALUE: 14
PIF_VALUE: 24
PIF_VALUE: 14
PIF_VALUE: 15
PIF_VALUE: 11
PIF_VALUE: 14
PIF_VALUE: 12
PIF_VALUE: 14
PIF_VALUE: 15
PIF_VALUE: 14
PIF_VALUE: 26
PIF_VALUE: 14
PIF_VALUE: 15
PIF_VALUE: 14
PIF_VALUE: 14
PIF_VALUE: 15
PIF_VALUE: 14
PIF_VALUE: 15
PIF_VALUE: 15
PIF_VALUE: 13
PIF_VALUE: 14
PIF_VALUE: 13
PIF_VALUE: 26
PIF_VALUE: 14
PIF_VALUE: 14
PIF_VALUE: 23
PIF_VALUE: 25
PIF_VALUE: 17
PIF_VALUE: 14
PIF_VALUE: 15
PIF_VALUE: 14
PIF_VALUE: 14
PIF_VALUE: 13
PIF_VALUE: 14
PIF_VALUE: 17
PIF_VALUE: 14
PIF_VALUE: 13
PIF_VALUE: 14
PIF_VALUE: 15
PIF_VALUE: 15
PIF_VALUE: 13
PIF_VALUE: 22
PIF_VALUE: 15
PIF_VALUE: 15
PIF_VALUE: 21
PIF_VALUE: 15
PIF_VALUE: 14
PIF_VALUE: 15
PIF_VALUE: 22
PIF_VALUE: 13
PIF_VALUE: 14
PIF_VALUE: 26
PIF_VALUE: 14
PIF_VALUE: 14
PIF_VALUE: 13
PIF_VALUE: 14
PIF_VALUE: 15
PIF_VALUE: 14
PIF_VALUE: 15
PIF_VALUE: 4
PIF_VALUE: 13
PIF_VALUE: 17
PIF_VALUE: 14
PIF_VALUE: 15
PIF_VALUE: 14
PIF_VALUE: 26
PIF_VALUE: 26
PIF_VALUE: 14
PIF_VALUE: 15
PIF_VALUE: 14
PIF_VALUE: 17
PIF_VALUE: 14
PIF_VALUE: 13
PIF_VALUE: 14
PIF_VALUE: 14
PIF_VALUE: 26
PIF_VALUE: 14
PIF_VALUE: 14
PIF_VALUE: 26
PIF_VALUE: 14
PIF_VALUE: 13
PIF_VALUE: 14
PIF_VALUE: 14
PIF_VALUE: 4
PIF_VALUE: 14
PIF_VALUE: 14
PIF_VALUE: 26
PIF_VALUE: 14
PIF_VALUE: 1
PIF_VALUE: 14
PIF_VALUE: 14
PIF_VALUE: 1
PIF_VALUE: 21
PIF_VALUE: 13
PIF_VALUE: 27
PIF_VALUE: 15
PIF_VALUE: 3
PIF_VALUE: 13
PIF_VALUE: 14
PIF_VALUE: 15
PIF_VALUE: 14
PIF_VALUE: 15
PIF_VALUE: 14
PIF_VALUE: 13
PIF_VALUE: 14
PIF_VALUE: 12
PIF_VALUE: 14
PIF_VALUE: 14
PIF_VALUE: 3
PIF_VALUE: 13
PIF_VALUE: 14
PIF_VALUE: 25
PIF_VALUE: 21
PIF_VALUE: 13
PIF_VALUE: 15
PIF_VALUE: 14
PIF_VALUE: 15
PIF_VALUE: 12
PIF_VALUE: 15
PIF_VALUE: 15
PIF_VALUE: 19
PIF_VALUE: 14
PIF_VALUE: 13
PIF_VALUE: 14
PIF_VALUE: 13
PIF_VALUE: 14
PIF_VALUE: 13
PIF_VALUE: 13
PIF_VALUE: 14
PIF_VALUE: 16
PIF_VALUE: 14
PIF_VALUE: 15
PIF_VALUE: 25
PIF_VALUE: 14
PIF_VALUE: 14
PIF_VALUE: 22
PIF_VALUE: 14
PIF_VALUE: 14
PIF_VALUE: 15
PIF_VALUE: 5
PIF_VALUE: 19
PIF_VALUE: 15
PIF_VALUE: 14
PIF_VALUE: 1
PIF_VALUE: 16
PIF_VALUE: 15
PIF_VALUE: 27
PIF_VALUE: 14
PIF_VALUE: 15
PIF_VALUE: 13
PIF_VALUE: 14
PIF_VALUE: 15
PIF_VALUE: 14
PIF_VALUE: 15
PIF_VALUE: 27
PIF_VALUE: 14
PIF_VALUE: 12
PIF_VALUE: 14
PIF_VALUE: 15
PIF_VALUE: 14
PIF_VALUE: 26
PIF_VALUE: 5
PIF_VALUE: 14
PIF_VALUE: 13
PIF_VALUE: 14
PIF_VALUE: 3
PIF_VALUE: 15

## 2018-10-04 ASSESSMENT — PAIN - FUNCTIONAL ASSESSMENT: PAIN_FUNCTIONAL_ASSESSMENT: 0-10

## 2018-10-04 ASSESSMENT — PAIN DESCRIPTION - LOCATION
LOCATION: ABDOMEN
LOCATION: ABDOMEN

## 2018-10-04 ASSESSMENT — PAIN DESCRIPTION - ONSET
ONSET: ON-GOING
ONSET: ON-GOING

## 2018-10-04 ASSESSMENT — PAIN DESCRIPTION - PAIN TYPE
TYPE: SURGICAL PAIN
TYPE: SURGICAL PAIN

## 2018-10-04 ASSESSMENT — PAIN SCALES - GENERAL
PAINLEVEL_OUTOF10: 10
PAINLEVEL_OUTOF10: 4
PAINLEVEL_OUTOF10: 1
PAINLEVEL_OUTOF10: 9
PAINLEVEL_OUTOF10: 9
PAINLEVEL_OUTOF10: 10
PAINLEVEL_OUTOF10: 1

## 2018-10-04 ASSESSMENT — PAIN DESCRIPTION - DESCRIPTORS
DESCRIPTORS: DULL;CONSTANT
DESCRIPTORS: CONSTANT

## 2018-10-04 ASSESSMENT — PAIN DESCRIPTION - PROGRESSION
CLINICAL_PROGRESSION: GRADUALLY WORSENING
CLINICAL_PROGRESSION: RAPIDLY IMPROVING

## 2018-10-04 ASSESSMENT — PAIN DESCRIPTION - FREQUENCY
FREQUENCY: CONTINUOUS
FREQUENCY: CONTINUOUS

## 2018-10-04 NOTE — ANESTHESIA PRE PROCEDURE
- CNP        sodium chloride flush 0.9 % injection 10 mL  10 mL Intravenous PRN Kristie Alvarez APRN - CNP        lactated ringers infusion                Allergies: Allergies   Allergen Reactions    Latex Rash    Codeine Other (See Comments)     Becomes very lethargic    Vicodin [Hydrocodone-Acetaminophen] Nausea Only       Problem List:    Patient Active Problem List   Diagnosis Code    Rectal bleed K62.5    Colitis K52.9    Complete uterine prolapse N81.3    [place]-Walker grade 4 cystocele N81.10    [place]-Walker grade 2 rectocele N81.6    Recurrent UTI N39.0       Past Medical History:        Diagnosis Date    Hyperlipidemia     meds > 5 yrs    Hypertension     meds > 5 yrs    Osteoarthritis     Prolapsed uterus        Past Surgical History:        Procedure Laterality Date    BREAST SURGERY Right     fibrocystic breast /Bx / benign    COLONOSCOPY      ENDOSCOPY, COLON, DIAGNOSTIC      EYE SURGERY      phaco with IOL OU.  FRACTURE SURGERY Right     due to fracture / had skeletal hardware    [place] COLONOSCOPY W/BIOPSY SINGLE/MULTIPLE N/A 4/10/2018    COLONOSCOPY WITH BIOPSYS performed by Alysa Mota MD at [place] [address] History:    Social History   Substance Use Topics    Smoking status: Never Smoker    Smokeless tobacco: Never Used    Alcohol use 0.0 oz/week      Comment: rare social                                Counseling given: Not Answered      Vital Signs (Current): There were no vitals filed for this visit.                                            BP Readings from Last 3 Encounters:   09/27/18 (!) 159/69   08/24/18 136/70   08/16/18 138/60       NPO Status:                                                                                 BMI:   Wt Readings from Last 3 Encounters:   09/27/18 137 lb 3.2 oz (62.2 kg)   08/24/18 134 lb (60.8 kg)   08/16/18 136 lb (61.7 kg)     There is no height or weight on file to calculate BMI.    CBC:   Lab Results   Component Value

## 2018-10-04 NOTE — PROGRESS NOTES
Patient assessment and admission complete. Perfectserved Dr. Makeda Tejeda about giving the lovenox and he indicated to wait until tomorrow to give. Vitals have been stable. Heart rate has been mid 40s to low 50s, but coming up as she awakens more. Dr. Makeda Tejeda made aware. Patient eating clear liquid dinner and tolerating well. Will continue to monitor.

## 2018-10-04 NOTE — ANESTHESIA POSTPROCEDURE EVALUATION
Department of Anesthesiology  Postprocedure Note    Patient: Sarath De La Cruz  MRN: [id number]  YOB: 1945  Date of evaluation: 10/4/2018  Time:  12:14 PM     Procedure Summary     Date:  10/04/18 Room / Location:  [place] [address] / [facility] OR    Anesthesia Start:  1859 Anesthesia Stop:  1214    Procedures:       LAPARSCOPIC HYSTERECTOMY (N/A )      USLS/ SSLS (N/A )      A-P REPAIR WITH TOT OBTRYX and SLING , CYSTOSCOPY (N/A ) Diagnosis:  (COMPLETE UTERINE PROLAPSE)    Surgeon:  Ana Cote MD Responsible Provider:  Abril Lugo MD    Anesthesia Type:  general ASA Status:  2          Anesthesia Type: general    Denice Phase I: Denice Score: 10    Denice Phase II:      Last vitals: Reviewed and per EMR flowsheets.        Anesthesia Post Evaluation    Patient location during evaluation: PACU  Patient participation: complete - patient participated  Level of consciousness: sleepy but conscious  Airway patency: patent  Nausea & Vomiting: no nausea and no vomiting  Complications: no  Cardiovascular status: blood pressure returned to baseline and hemodynamically stable  Respiratory status: acceptable and nasal cannula  Hydration status: euvolemic

## 2018-10-04 NOTE — H&P
Pulse 52   Ht 5' (1.524 m)   Wt 134 lb (60.8 kg)   BMI 26.17 kg/m²   Lungs: CTAB   Heart : Regular S1/S2, no M/R/G  Abdomen: Soft , NT, ND , + BS     Pelvic exam :     Diagnosis Orders   1. Complete uterine prolapse      2. [place]-Walker grade 4 cystocele      3. Temple-Walker grade 2 rectocele      4. Recurrent UTI         + urine with coughing and valsalva after reduction of uterine prolapse.      Assessment:        Diagnosis Orders   1. Complete uterine prolapse      2. Temple-Walker grade 4 cystocele      3. Temple-Walker grade 2 rectocele      4. Recurrent UTI            Plan:      Discussed pessary vs surgery . Patient wants surgery . To be scheduled for Laparoscopic hysterectomy with possible USLS/ SSLS , A-P repair with prophylactic TOT , Obtryx mesh from [address] [place] [address] Road. Discussed risks of failiure, infection , bleeding and injury of internal organs. As well as risks related to the mesh , including erosion and chronic pain. All questions answered. Medical clearance from PCP obtained.         Radha Meeks M.D., F.A.C.O. G

## 2018-10-05 VITALS
HEIGHT: 61 IN | SYSTOLIC BLOOD PRESSURE: 131 MMHG | DIASTOLIC BLOOD PRESSURE: 54 MMHG | HEART RATE: 71 BPM | WEIGHT: 140 LBS | TEMPERATURE: 98.1 F | BODY MASS INDEX: 26.43 KG/M2 | OXYGEN SATURATION: 100 % | RESPIRATION RATE: 19 BRPM

## 2018-10-05 LAB
HCT VFR BLD CALC: 30.9 % (ref 37–47)
HEMOGLOBIN: 10.4 G/DL (ref 12–16)
MCH RBC QN AUTO: 31 PG (ref 27–31.3)
MCHC RBC AUTO-ENTMCNC: 33.8 % (ref 33–37)
MCV RBC AUTO: 91.7 FL (ref 82–100)
PDW BLD-RTO: 13.5 % (ref 11.5–14.5)
PLATELET # BLD: 190 K/UL (ref 130–400)
RBC # BLD: 3.36 M/UL (ref 4.2–5.4)
WBC # BLD: 14.3 K/UL (ref 4.8–10.8)

## 2018-10-05 PROCEDURE — 85027 COMPLETE CBC AUTOMATED: CPT

## 2018-10-05 PROCEDURE — 2580000003 HC RX 258: Performed by: OBSTETRICS & GYNECOLOGY

## 2018-10-05 PROCEDURE — 6370000000 HC RX 637 (ALT 250 FOR IP): Performed by: OBSTETRICS & GYNECOLOGY

## 2018-10-05 PROCEDURE — 36415 COLL VENOUS BLD VENIPUNCTURE: CPT

## 2018-10-05 PROCEDURE — 2580000003 HC RX 258

## 2018-10-05 PROCEDURE — 6360000002 HC RX W HCPCS: Performed by: OBSTETRICS & GYNECOLOGY

## 2018-10-05 RX ORDER — OXYCODONE HYDROCHLORIDE AND ACETAMINOPHEN 5; 325 MG/1; MG/1
1 TABLET ORAL EVERY 6 HOURS PRN
Qty: 8 TABLET | Refills: 0 | Status: SHIPPED | OUTPATIENT
Start: 2018-10-05 | End: 2018-10-31

## 2018-10-05 RX ORDER — ACETAMINOPHEN 500 MG
1000 TABLET ORAL EVERY 6 HOURS PRN
Qty: 60 TABLET | Refills: 1 | Status: SHIPPED | OUTPATIENT
Start: 2018-10-05

## 2018-10-05 RX ORDER — MAGNESIUM HYDROXIDE 1200 MG/15ML
LIQUID ORAL
Status: COMPLETED
Start: 2018-10-05 | End: 2018-10-05

## 2018-10-05 RX ORDER — POLYETHYLENE GLYCOL 3350 17 G/17G
17 POWDER, FOR SOLUTION ORAL 2 TIMES DAILY PRN
Qty: 1020 G | Refills: 1 | Status: SHIPPED | OUTPATIENT
Start: 2018-10-05 | End: 2018-11-04

## 2018-10-05 RX ORDER — IBUPROFEN 600 MG/1
600 TABLET ORAL EVERY 6 HOURS PRN
Qty: 60 TABLET | Refills: 1 | Status: SHIPPED | OUTPATIENT
Start: 2018-10-05 | End: 2021-05-21

## 2018-10-05 RX ORDER — SIMETHICONE 80 MG
80 TABLET,CHEWABLE ORAL 4 TIMES DAILY PRN
Qty: 180 TABLET | Refills: 1 | Status: SHIPPED | OUTPATIENT
Start: 2018-10-05 | End: 2021-05-21

## 2018-10-05 RX ORDER — DOCUSATE SODIUM 100 MG/1
100 CAPSULE, LIQUID FILLED ORAL 2 TIMES DAILY PRN
Qty: 60 CAPSULE | Refills: 2 | Status: SHIPPED | OUTPATIENT
Start: 2018-10-05 | End: 2021-05-21

## 2018-10-05 RX ADMIN — Medication 2 G: at 00:05

## 2018-10-05 RX ADMIN — SODIUM CHLORIDE: 9 INJECTION, SOLUTION INTRAVENOUS at 00:05

## 2018-10-05 RX ADMIN — ENOXAPARIN SODIUM 40 MG: 40 INJECTION SUBCUTANEOUS at 08:37

## 2018-10-05 RX ADMIN — SODIUM CHLORIDE: 9 INJECTION, SOLUTION INTRAVENOUS at 10:07

## 2018-10-05 RX ADMIN — KETOROLAC TROMETHAMINE 30 MG: 30 INJECTION, SOLUTION INTRAMUSCULAR at 03:33

## 2018-10-05 RX ADMIN — WATER: 1 IRRIGANT IRRIGATION at 13:33

## 2018-10-05 RX ADMIN — KETOROLAC TROMETHAMINE 30 MG: 30 INJECTION, SOLUTION INTRAMUSCULAR at 08:36

## 2018-10-05 RX ADMIN — DOCUSATE SODIUM 100 MG: 100 CAPSULE, LIQUID FILLED ORAL at 08:36

## 2018-10-05 ASSESSMENT — PAIN DESCRIPTION - PAIN TYPE: TYPE: SURGICAL PAIN

## 2018-10-05 ASSESSMENT — PAIN SCALES - GENERAL
PAINLEVEL_OUTOF10: 1
PAINLEVEL_OUTOF10: 6

## 2018-10-05 NOTE — CARE COORDINATION
Spoke with patient regarding discharge planning. She is from home with her spouse and son. She denies any needs at discharge. Will follow as needs arise.

## 2018-10-19 ENCOUNTER — TELEPHONE (OUTPATIENT)
Dept: OBGYN CLINIC | Age: 73
End: 2018-10-19

## 2018-10-19 ENCOUNTER — OFFICE VISIT (OUTPATIENT)
Dept: OBGYN CLINIC | Age: 73
End: 2018-10-19

## 2018-10-19 VITALS
DIASTOLIC BLOOD PRESSURE: 72 MMHG | WEIGHT: 133 LBS | HEIGHT: 60 IN | SYSTOLIC BLOOD PRESSURE: 130 MMHG | HEART RATE: 60 BPM | BODY MASS INDEX: 26.11 KG/M2

## 2018-10-19 DIAGNOSIS — Z09 POSTOP CHECK: Primary | ICD-10-CM

## 2018-10-19 DIAGNOSIS — N76.0 BV (BACTERIAL VAGINOSIS): ICD-10-CM

## 2018-10-19 DIAGNOSIS — B96.89 BV (BACTERIAL VAGINOSIS): ICD-10-CM

## 2018-10-19 PROCEDURE — 99024 POSTOP FOLLOW-UP VISIT: CPT | Performed by: OBSTETRICS & GYNECOLOGY

## 2018-10-19 RX ORDER — CLINDAMYCIN HYDROCHLORIDE 300 MG/1
300 CAPSULE ORAL 3 TIMES DAILY
Qty: 30 CAPSULE | Refills: 0 | Status: SHIPPED | OUTPATIENT
Start: 2018-10-19 | End: 2018-10-29

## 2018-10-24 NOTE — OP NOTE
[address]                     [address], [address] [facility]                               OPERATIVE REPORT    PATIENT NAME: Prosper Gilliam                    :         1945  MED REC NO:   [id number]                            ROOM:       W490  ACCOUNT NO:   [de-identified]                           ADMIT DATE:  10/04/2018  PROVIDER:     Shanon Cohen MD    DATE OF PROCEDURE:  10/04/2018    PREOPERATIVE DIAGNOSES:  Complete uterine prolapse, grade 3 to 4  cystocele, grade 2 to 3 rectocele, stress urinary incontinence. POSTOPERATIVE DIAGNOSES:  Complete uterine prolapse, grade 3 to 4  cystocele, grade 2 to 3 rectocele, stress urinary incontinence. OPERATION PERFORMED:  Laparoscopic hysterectomy with bilateral  salpingo-oophorectomy with vaginal sacrospinous ligament suspension,  AP repair with TOT Obtryx transobturator tape followed by cystoscopy. ANESTHESIA:  General.    SURGEON:  Shanon Cohen MD.    ASSISTANT:  Surgical staff. ESTIMATED BLOOD LOSS:  Around 300 mL. COMPLICATIONS:  None. SPECIMENS:  Uterus, tubes and ovaries. FINDINGS:  As above. OPERATIVE TECHNIQUE:  The patient was taken to the operating room and  after adequate general anesthesia, she was scrubbed and draped in the  usual manner for laparoscopic and vaginal procedure. Attention was  turned first towards the vagina and the VCare vaginal manipulator was  inserted through the ureter and cervix and the VCare cup was secured  around the cervix by fastening the screw on the manipulator. After  the manipulator was secured, attention was towards to the abdomen and  a Veress needle was inserted through the umbilicus into the abdomen. Abdomen was inflated to 15 mmHg of CO2 gas after which a 5 mm  subumbilical incision was made through which a 5-mm trocar was  inserted under visualization through inserting the laparoscope through  the trocar during the insertion.   After confirming

## 2018-11-02 ENCOUNTER — OFFICE VISIT (OUTPATIENT)
Dept: OBGYN CLINIC | Age: 73
End: 2018-11-02

## 2018-11-02 VITALS
HEART RATE: 68 BPM | SYSTOLIC BLOOD PRESSURE: 152 MMHG | BODY MASS INDEX: 26.11 KG/M2 | HEIGHT: 60 IN | DIASTOLIC BLOOD PRESSURE: 62 MMHG | WEIGHT: 133 LBS

## 2018-11-02 DIAGNOSIS — Z09 POSTOP CHECK: Primary | ICD-10-CM

## 2018-11-02 PROCEDURE — 99024 POSTOP FOLLOW-UP VISIT: CPT | Performed by: OBSTETRICS & GYNECOLOGY

## 2018-11-02 RX ORDER — METRONIDAZOLE 500 MG/1
500 TABLET ORAL 2 TIMES DAILY
Qty: 14 TABLET | Refills: 0 | Status: SHIPPED | OUTPATIENT
Start: 2018-11-02 | End: 2018-11-09

## 2018-11-02 NOTE — PROGRESS NOTES
Post Op Exam     Cele Aguilar is a 74y.o. year old female who presents to the office  4weeks status post COMPLETE UTERINE PROLAPSE, grade 3-4 cystocele , grade 2-3 rectocele  s/p LAPARSCOPIC HYSTERECTOMY with vaginal SSLS  A-P REPAIR WITH TOT OBTRYX, CYSTOSCOPY LATEX ALLERGY   .Bowel movements are Normal.  The patient is not having any pain. .  . satisfiedwith post-procedure results. Denies urinary leakage. Vitals:  BP (!) 152/62 (Site: Right Upper Arm, Position: Sitting, Cuff Size: Medium Adult)   Pulse 68   Ht 5' 0.05\" (1.525 m)   Wt 133 lb (60.3 kg)   LMP 09/27/1996   BMI 25.93 kg/m²   Allergies:  Latex; Codeine; and Vicodin [hydrocodone-acetaminophen]  Past Medical History:   Diagnosis Date    Hyperlipidemia     meds > 5 yrs    Hypertension     meds > 5 yrs    Osteoarthritis     Prolapsed uterus      Past Surgical History:   Procedure Laterality Date    BREAST SURGERY Right     fibrocystic breast /Bx / benign    COLONOSCOPY      ENDOSCOPY, COLON, DIAGNOSTIC      EYE SURGERY      phaco with IOL OU.     FRACTURE SURGERY Right     due to fracture / had skeletal hardware    ID COLONOSCOPY W/BIOPSY SINGLE/MULTIPLE N/A 4/10/2018    COLONOSCOPY WITH BIOPSYS performed by Rosalina Suarez MD at [facility] >250GMS N/A 10/4/2018    LAPARSCOPIC HYSTERECTOMY performed by Edmon Lundborg, MD at [address] [facility] N/A 10/4/2018    USLS/ SSLS performed by Edmon Lundborg, MD at [facility] N/A 10/4/2018    A-P REPAIR WITH TOT OBTRYX and SLING , CYSTOSCOPY performed by Edmon Lundborg, MD at [facility]     OB History     No data available        Family History   Problem Relation Age of Onset    High Blood Pressure Mother     Other Father         DVTs 1960    Heart Attack Father     Asthma Brother     Cancer Brother         bladder cancer    COPD Brother     High Blood Pressure Son     Other Son         slow IQ    Seizures Son      Social History     Social History    Marital status:      Spouse name: N/A    Number of children: N/A    Years of education: N/A     Occupational History    Not on file. Social History Main Topics    Smoking status: Never Smoker    Smokeless tobacco: Never Used    Alcohol use 0.0 oz/week      Comment: rare social    Drug use: No    Sexual activity: Not on file      Comment:      Other Topics Concern    Not on file     Social History Narrative    No narrative on file       Contraceptive method:  none    Patient's medications, allergies, past medical, surgical,social and family histories were reviewed and updated as appropriate. Review of Systems  Review of Systems   All other systems reviewed and are negative. Review of Systems  Constitutional: Negative for chills and fever. Respiratory: Negative for coughand shortness of breath. Cardiovascular: Negative for chestpain and palpitations. Gastrointestinal: Negative for nauseaand vomiting. Genitourinary: Negative for dysuria, frequencyand urgency. Musculoskeletal: Negative for myalgias. Neurological: Negative for dizziness, seizures andheadaches. Psychiatric/Behavioral: Negativefor depression and suicidal ideas. Physical Exam  Physical Exam  Physical Exam   Constitutional: Sheis well-developed, well-nourished, and in no distress. Cardiovascular: Normal rate and regularrhythm. Pulmonary/Chest: Effort normal and breath sounds normal.  Abdominal: Soft. Bowel sounds are normal.   Incision/s intact clean and dry. Healingadequately. Genitourinary:   Genitourinary Comments: deferred      Assessment:     Patient state:  Doingwell postoperatively. Operative findings again reviewed. Diagnosis Orders   1. Postop check          Pathology reportdiscussed.    disucssed     Plan:     Continue strict pelvic rest for another 4 weeks   Flagyl for vaginal discharge   Return in 4 weeks for vaginal exam.    No orders of the defined types were placed in this encounter. Orders Placed This Encounter   Medications    metroNIDAZOLE (FLAGYL) 500 MG tablet     Sig: Take 1 tablet by mouth 2 times daily for 7 days     Dispense:  14 tablet     Refill:  0       1. Continueany current medications. 2. Pt is to increase activities as tolerated. 3. Followup: Return for podtop check . 4. Anticipated return to work none.      Edmon Lundborg, MD

## 2018-11-18 PROBLEM — Z09 POSTOP CHECK: Status: RESOLVED | Noted: 2018-10-19 | Resolved: 2018-11-18

## 2018-11-19 PROBLEM — B96.89 BV (BACTERIAL VAGINOSIS): Status: ACTIVE | Noted: 2018-11-19

## 2018-11-19 PROBLEM — N76.0 BV (BACTERIAL VAGINOSIS): Status: ACTIVE | Noted: 2018-11-19

## 2018-11-30 ENCOUNTER — OFFICE VISIT (OUTPATIENT)
Dept: OBGYN CLINIC | Age: 73
End: 2018-11-30

## 2018-11-30 VITALS
WEIGHT: 134 LBS | SYSTOLIC BLOOD PRESSURE: 128 MMHG | HEIGHT: 61 IN | HEART RATE: 52 BPM | BODY MASS INDEX: 25.3 KG/M2 | DIASTOLIC BLOOD PRESSURE: 62 MMHG

## 2018-11-30 DIAGNOSIS — Z09 POSTOP CHECK: Primary | ICD-10-CM

## 2018-11-30 PROCEDURE — 99024 POSTOP FOLLOW-UP VISIT: CPT | Performed by: OBSTETRICS & GYNECOLOGY

## 2018-11-30 RX ORDER — ESTRADIOL 0.1 MG/G
1 CREAM VAGINAL DAILY
Qty: 1 TUBE | Refills: 3 | Status: SHIPPED | OUTPATIENT
Start: 2018-11-30 | End: 2021-05-21

## 2018-12-26 NOTE — PROGRESS NOTES
History    Marital status:      Spouse name: N/A    Number of children: N/A    Years of education: N/A     Occupational History    Not on file. Social History Main Topics    Smoking status: Never Smoker    Smokeless tobacco: Never Used    Alcohol use 0.0 oz/week      Comment: rare social    Drug use: No    Sexual activity: Not on file      Comment:      Other Topics Concern    Not on file     Social History Narrative    No narrative on file       Contraceptive method:  none    Patient's medications, allergies, past medical, surgical,social and family histories were reviewed and updated as appropriate. Review of Systems  Review of Systems   All other systems reviewed and are negative. Review of Systems  Constitutional: Negative for chills and fever. Respiratory: Negative for coughand shortness of breath. Cardiovascular: Negative for chestpain and palpitations. Gastrointestinal: Negative for nauseaand vomiting. Genitourinary: Negative for dysuria, frequencyand urgency. Musculoskeletal: Negative for myalgias. Neurological: Negative for dizziness, seizures andheadaches. Psychiatric/Behavioral: Negativefor depression and suicidal ideas. Physical Exam  Physical Exam  Physical Exam   Constitutional: Sheis well-developed, well-nourished, and in no distress. Cardiovascular: Normal rate and regularrhythm. Pulmonary/Chest: Effort normal and breath sounds normal.  Abdominal: Soft. Bowel sounds are normal.   Incision/s intact clean and dry. Healingadequately. Genitourinary:   Genitourinary Comments: Vaginal exam shows surgical sites intact and healing adequately, no evidence of abnormal vaginal discharge, vagina suspended to the level of the sacral spine. Assessment:     Patient state:  Doingwell postoperatively. Operative findings again reviewed. Diagnosis Orders   1. Postop check          Pathology reportdiscussed.    disucssed     Plan:     Continue strict

## 2020-03-27 ENCOUNTER — TELEPHONE (OUTPATIENT)
Dept: UROLOGY | Age: 75
End: 2020-03-27

## 2020-03-27 DIAGNOSIS — R30.0 BURNING WITH URINATION: ICD-10-CM

## 2020-03-29 LAB
ORGANISM: ABNORMAL
URINE CULTURE, ROUTINE: ABNORMAL

## 2020-03-30 RX ORDER — SULFAMETHOXAZOLE AND TRIMETHOPRIM 800; 160 MG/1; MG/1
1 TABLET ORAL 2 TIMES DAILY
Qty: 14 TABLET | Refills: 0 | Status: SHIPPED | OUTPATIENT
Start: 2020-03-30 | End: 2020-04-06

## 2020-06-19 ENCOUNTER — OFFICE VISIT (OUTPATIENT)
Dept: UROLOGY | Age: 75
End: 2020-06-19
Payer: MEDICARE

## 2020-06-19 VITALS
HEIGHT: 60 IN | BODY MASS INDEX: 23.56 KG/M2 | SYSTOLIC BLOOD PRESSURE: 126 MMHG | HEART RATE: 59 BPM | WEIGHT: 120 LBS | OXYGEN SATURATION: 99 % | DIASTOLIC BLOOD PRESSURE: 80 MMHG

## 2020-06-19 LAB
BILIRUBIN, POC: ABNORMAL
BLOOD URINE, POC: ABNORMAL
CLARITY, POC: CLEAR
COLOR, POC: ABNORMAL
GLUCOSE URINE, POC: ABNORMAL
KETONES, POC: ABNORMAL
LEUKOCYTE EST, POC: ABNORMAL
NITRITE, POC: ABNORMAL
PH, POC: 6
PROTEIN, POC: ABNORMAL
SPECIFIC GRAVITY, POC: 1.02
UROBILINOGEN, POC: 0.2

## 2020-06-19 PROCEDURE — 81003 URINALYSIS AUTO W/O SCOPE: CPT | Performed by: UROLOGY

## 2020-06-19 PROCEDURE — 99214 OFFICE O/P EST MOD 30 MIN: CPT | Performed by: UROLOGY

## 2020-06-19 RX ORDER — SULFAMETHOXAZOLE AND TRIMETHOPRIM 800; 160 MG/1; MG/1
1 TABLET ORAL 2 TIMES DAILY
Qty: 60 TABLET | Refills: 0 | Status: SHIPPED | OUTPATIENT
Start: 2020-06-19 | End: 2020-07-19

## 2020-06-19 RX ORDER — CEPHALEXIN 500 MG/1
CAPSULE ORAL
COMMUNITY
Start: 2020-06-12 | End: 2021-05-21

## 2020-06-19 NOTE — PROGRESS NOTES
[facility] EVALUATION NOTE                                                 H&P                                                                                                                                                 Reason for Visit  Recurrent UTIs    History of Present Illness  57-year-old female who had a hematuria work-up by me in 2018  That evaluation showed that recurrent UTIs were most likely secondary to pelvic prolapse  Patient underwent hysterectomy and anterior repair by Dr. Lesley Young  Following the surgery patient was UTI free for 1 year  Has had 2 episodes of positive cultures most recent culture was negative  Currently on antibiotic  At this time patient will be managed with intermittent antibiotic therapy if she becomes symptomatic      Urologic Review of Systems/Symptoms  Denies hematuria  Denies dysuria  Denies incontinence  Denies flank pain  Other Urologic: Recent anterior repair    Review of Systems  Head and neck: No issues/reviewed  Cardiac: No recent issues/reviewed  Pulmonary: No issues/reviewed  Gastrointestinal: No issues/reviewed  Neurologic: No recent issues/reviewed  Extremities: No issues/reviewed  Lymphatics: No lymphadenopathy no change  Genitourinary: See above  Skin: No issues/reviewed  Hospitalization: None recent  Meds reviewed  All 14 categories of Review of Systems otherwise reviewed no other findings reported. Past Medical History:   Diagnosis Date    Hyperlipidemia     meds > 5 yrs    Hypertension     meds > 5 yrs    Osteoarthritis     Prolapsed uterus      Past Surgical History:   Procedure Laterality Date    BREAST SURGERY Right     fibrocystic breast /Bx / benign    COLONOSCOPY      ENDOSCOPY, COLON, DIAGNOSTIC      EYE SURGERY      phaco with IOL OU.     FRACTURE SURGERY Right     due to fracture / had skeletal hardware    MD COLONOSCOPY W/BIOPSY SINGLE/MULTIPLE N/A 4/10/2018    COLONOSCOPY WITH BIOPSYS performed by Shea Kong MD at [facility]  MD LAP,VAG HYST,UTERUS >250GMS N/A 10/4/2018    LAPARSCOPIC HYSTERECTOMY performed by Carolynn Machado MD at [address] [facility] N/A 10/4/2018    USLS/ SSLS performed by Carolynn Machado MD at [address] [facility] N/A 10/4/2018    A-P REPAIR WITH TOT OBTRYX and SLING , CYSTOSCOPY performed by Carolynn Machado MD at [address] History     Socioeconomic History    Marital status:      Spouse name: None    Number of children: None    Years of education: None    Highest education level: None   Occupational History    None   Social Needs    Financial resource strain: None    Food insecurity     Worry: None     Inability: None    Transportation needs     Medical: None     Non-medical: None   Tobacco Use    Smoking status: Never Smoker    Smokeless tobacco: Never Used   Substance and Sexual Activity    Alcohol use:  Yes     Alcohol/week: 0.0 standard drinks     Comment: rare social    Drug use: No    Sexual activity: None     Comment:    Lifestyle    Physical activity     Days per week: None     Minutes per session: None    Stress: None   Relationships    Social connections     Talks on phone: None     Gets together: None     Attends Protestant service: None     Active member of club or organization: None     Attends meetings of clubs or organizations: None     Relationship status: None    Intimate partner violence     Fear of current or ex partner: None     Emotionally abused: None     Physically abused: None     Forced sexual activity: None   Other Topics Concern    None   Social History Narrative    None     Family History   Problem Relation Age of Onset    High Blood Pressure Mother     Other Father         DVTs 1960    Heart Attack Father     Asthma Brother     Cancer Brother         bladder cancer    COPD Brother     High Blood Pressure Son     Other Son         slow IQ    Seizures Son      Current Outpatient Medications   Medication Sig Dispense Refill grammar, punctuation and spelling as well as words and phrases that may seem inappropriate. If there are questions or concerns please feel free to contact me to clarify.

## 2021-04-15 ENCOUNTER — NURSE ONLY (OUTPATIENT)
Dept: UROLOGY | Age: 76
End: 2021-04-15
Payer: MEDICARE

## 2021-04-15 ENCOUNTER — TELEPHONE (OUTPATIENT)
Dept: UROLOGY | Age: 76
End: 2021-04-15

## 2021-04-15 DIAGNOSIS — R35.0 FREQUENCY OF URINATION: Primary | ICD-10-CM

## 2021-04-15 DIAGNOSIS — R35.0 FREQUENCY OF URINATION: ICD-10-CM

## 2021-04-15 PROCEDURE — 81003 URINALYSIS AUTO W/O SCOPE: CPT | Performed by: UROLOGY

## 2021-04-15 NOTE — TELEPHONE ENCOUNTER
ua shows   Component   Color, UA   yellow    Clarity, UA   cloudy    Glucose, UA POC   neg    Bilirubin, UA   neg    Ketones, UA   neg    Spec Grav, UA   1.025    Blood, UA POC   small    pH, UA   6.0    Protein, UA POC   trace    Urobilinogen, UA   0.2    Leukocytes, UA   small    Nitrite, UA   positive      Culture pended

## 2021-04-18 LAB
ORGANISM: ABNORMAL
URINE CULTURE, ROUTINE: ABNORMAL

## 2021-04-19 RX ORDER — CEPHALEXIN 500 MG/1
500 CAPSULE ORAL 2 TIMES DAILY
Qty: 20 CAPSULE | Refills: 0 | Status: SHIPPED | OUTPATIENT
Start: 2021-04-19 | End: 2021-05-21

## 2021-04-19 NOTE — TELEPHONE ENCOUNTER
meds pended per result note     Requested Prescriptions     Pending Prescriptions Disp Refills    cephALEXin (KEFLEX) 500 MG capsule 20 capsule 0     Sig: Take 1 capsule by mouth 2 times daily

## 2021-05-21 ENCOUNTER — OFFICE VISIT (OUTPATIENT)
Dept: UROLOGY | Age: 76
End: 2021-05-21
Payer: MEDICARE

## 2021-05-21 VITALS
HEIGHT: 60 IN | WEIGHT: 125 LBS | DIASTOLIC BLOOD PRESSURE: 60 MMHG | SYSTOLIC BLOOD PRESSURE: 132 MMHG | HEART RATE: 55 BPM | BODY MASS INDEX: 24.54 KG/M2

## 2021-05-21 DIAGNOSIS — N39.0 RECURRENT UTI: ICD-10-CM

## 2021-05-21 DIAGNOSIS — R33.9 RETENTION OF URINE: Primary | ICD-10-CM

## 2021-05-21 LAB
BILIRUBIN, POC: ABNORMAL
BLOOD URINE, POC: ABNORMAL
CLARITY, POC: ABNORMAL
COLOR, POC: YELLOW
GLUCOSE URINE, POC: ABNORMAL
KETONES, POC: ABNORMAL
LEUKOCYTE EST, POC: ABNORMAL
NITRITE, POC: POSITIVE
PH, POC: 5.5
POST VOID RESIDUAL (PVR): 0 ML
PROTEIN, POC: ABNORMAL
SPECIFIC GRAVITY, POC: 1.02
UROBILINOGEN, POC: 0.2

## 2021-05-21 PROCEDURE — 99214 OFFICE O/P EST MOD 30 MIN: CPT | Performed by: UROLOGY

## 2021-05-21 PROCEDURE — 51798 US URINE CAPACITY MEASURE: CPT | Performed by: UROLOGY

## 2021-05-21 PROCEDURE — 81003 URINALYSIS AUTO W/O SCOPE: CPT | Performed by: UROLOGY

## 2021-05-21 RX ORDER — METOPROLOL SUCCINATE 100 MG/1
TABLET, EXTENDED RELEASE ORAL
COMMUNITY
Start: 2021-05-11

## 2021-05-21 RX ORDER — FLUOCINOLONE ACETONIDE 0.25 MG/G
OINTMENT TOPICAL
COMMUNITY
Start: 2021-04-05 | End: 2021-08-30

## 2021-05-21 RX ORDER — POTASSIUM CHLORIDE 750 MG/1
TABLET, FILM COATED, EXTENDED RELEASE ORAL
COMMUNITY
Start: 2021-03-01

## 2021-05-21 RX ORDER — HYDROCHLOROTHIAZIDE 12.5 MG/1
CAPSULE, GELATIN COATED ORAL
COMMUNITY
Start: 2021-03-17

## 2021-05-21 RX ORDER — CIPROFLOXACIN 500 MG/1
500 TABLET, FILM COATED ORAL 2 TIMES DAILY
Qty: 14 TABLET | Refills: 0 | Status: SHIPPED | OUTPATIENT
Start: 2021-05-21 | End: 2021-07-25 | Stop reason: ALTCHOICE

## 2021-05-21 NOTE — PROGRESS NOTES
[facility] EVALUATION NOTE                                                 H&P                                                                                                                                                 Reason for Visit  Frequency of urination    History of Present Illness  47-year-old female with history of urinary tract infections  Currently having lateral pain and nocturia  Urinalysis shows nitrites hematuria and microhematuria  Urine culture will be sent  Patient was started on ciprofloxacin      Urologic Review of Systems/Symptoms  History of UTIs    Review of Systems  Hospitalization: None recent  All 14 categories of Review of Systems otherwise reviewed no other findings reported. No change in review of systems  Past Medical History:   Diagnosis Date    Hyperlipidemia     meds > 5 yrs    Hypertension     meds > 5 yrs    Osteoarthritis     Prolapsed uterus      Past Surgical History:   Procedure Laterality Date    BREAST SURGERY Right     fibrocystic breast /Bx / benign    COLONOSCOPY      ENDOSCOPY, COLON, DIAGNOSTIC      EYE SURGERY      phaco with IOL OU.     FRACTURE SURGERY Right     due to fracture / had skeletal hardware    [place] COLONOSCOPY W/BIOPSY SINGLE/MULTIPLE N/A 4/10/2018    COLONOSCOPY WITH BIOPSYS performed by Kvng Gonzalez MD at [facility] 94 >250GMS N/A 10/4/2018    LAPARSCOPIC HYSTERECTOMY performed by Ceasar Eddy MD at [address] [facility] N/A 10/4/2018    USLS/ SSLS performed by Ceasar Eddy MD at [address] [facility] N/A 10/4/2018    A-P REPAIR WITH TOT OBTRYX and SLING , CYSTOSCOPY [facility] History     Socioeconomic History    Marital status:      Spouse name: None    Number of children: None    Years of education: None    Highest education level: None   Occupational History    None   Tobacco Use    Smoking status: Never Smoker    Smokeless  ciprofloxacin (CIPRO) 500 MG tablet Take 1 tablet by mouth 2 times daily 14 tablet 0    acetaminophen (APAP EXTRA STRENGTH) 500 MG tablet Take 2 tablets by mouth every 6 hours as needed for Pain 60 tablet 1    Cholecalciferol (VITAMIN D) 2000 units CAPS capsule Take by mouth Daily with supper      aspirin 81 MG tablet Take by mouth daily       Calcium Citrate-Vitamin D (CALCIUM CITRATE + D3 PO) Take by mouth 2 times daily       diltiazem (CARDIZEM CD) 240 MG ER capsule Take 1 capsule by mouth daily (Patient taking differently: Take 240 mg by mouth daily ) 90 capsule 3    pravastatin (PRAVACHOL) 20 MG tablet Take 1 tablet by mouth daily (Patient taking differently: Take 20 mg by mouth daily ) 90 tablet 3     No current facility-administered medications for this visit. Latex, Bactrim [sulfamethoxazole-trimethoprim], Codeine, and Vicodin [hydrocodone-acetaminophen]  All reviewed and verified by Dr Surekha Christianson on today's visit    No results found for: PSA, PSADIA  Results for POC orders placed in visit on 05/21/21   POCT Urinalysis No Micro (Auto)   Result Value Ref Range    Color, UA yellow     Clarity, UA cloudy     Glucose, UA POC neg     Bilirubin, UA neg     Ketones, UA neg     Spec Grav, UA 1.025     Blood, UA POC moderate     pH, UA 5.5     Protein, UA POC 30 mg/dL     Urobilinogen, UA 0.2     Leukocytes, UA large     Nitrite, UA positive    poct post void residual   Result Value Ref Range    post void residual 0 ml    Narrative    A point of care test   Post Void Residual was completed by performing  ultrasound scan of the bladder and  reviewed by Dr Surekha Christianson       Physical Exam  Vitals:    05/21/21 0941   BP: 132/60   Pulse: 55   Weight: 125 lb (56.7 kg)   Height: 5' (1.524 m)     Constitutional: Not in distress. Cardiovascular: Normal rate, BP reviewed. Unchanged  Pulmonary/Chest: Normal respiratory effort not short of breath  Abdominal: Not distended.  No suprapubic discomfort during postvoid residual check  Urologic Exam  Postvoid residual is zero  Urinalysis nitrite positive. Rest of physical exam is unremarkable  Assessment/Medical Necessity-Decision Making  Acute UTI with nitrite positive urine  Previous history of E. coli infection was sensitive to Cipro  Patient has sensitivity to Bactrim  Plan  Cipro 500 p.o. twice daily x7 days  Urine culture  Contact patient if antibiotic not effective  Otherwise patient will follow up as needed or if symptoms get worse  Greater than 50% of 30 minutes spent consulting patient face-to-face  Orders Placed This Encounter   Procedures    Culture, Urine     Standing Status:   Future     Standing Expiration Date:   5/21/2022     Order Specific Question:   Specify (ex-cath, midstream, cysto, etc)? Answer:   m    POCT Urinalysis No Micro (Auto)    poct post void residual     Orders Placed This Encounter   Medications    ciprofloxacin (CIPRO) 500 MG tablet     Sig: Take 1 tablet by mouth 2 times daily     Dispense:  14 tablet     Refill:  0     Gogo Singh MD       Please note this report has been partially produced using speech recognition software  And may cause contain errors related to that system including grammar, punctuation and spelling as well as words and phrases that may seem inappropriate. If there are questions or concerns please feel free to contact me to clarify.

## 2021-05-24 LAB
ORGANISM: ABNORMAL
URINE CULTURE, ROUTINE: ABNORMAL

## 2021-05-26 ENCOUNTER — TELEPHONE (OUTPATIENT)
Dept: UROLOGY | Age: 76
End: 2021-05-26

## 2021-05-26 DIAGNOSIS — N39.0 RECURRENT UTI: Primary | ICD-10-CM

## 2021-05-26 DIAGNOSIS — N39.0 RECURRENT UTI: ICD-10-CM

## 2021-05-26 NOTE — TELEPHONE ENCOUNTER
Pt called and stated that she has 1 day left of Cipro and is still having frequency (about every 4.5 hours) and nocturia x4. Pt stated that she was told to call us today and let us know if she was not better.  Patient can be reached at [phone] Please advise

## 2021-05-26 NOTE — TELEPHONE ENCOUNTER
Drop of urine to chesck a culture  Symptoms can last for a few weeks after infection is treated due to inflammation

## 2021-05-28 LAB — URINE CULTURE, ROUTINE: NORMAL

## 2021-07-25 ENCOUNTER — OFFICE VISIT (OUTPATIENT)
Dept: FAMILY MEDICINE CLINIC | Age: 76
End: 2021-07-25
Payer: MEDICARE

## 2021-07-25 VITALS
DIASTOLIC BLOOD PRESSURE: 68 MMHG | SYSTOLIC BLOOD PRESSURE: 124 MMHG | HEIGHT: 60 IN | BODY MASS INDEX: 25.52 KG/M2 | WEIGHT: 130 LBS | OXYGEN SATURATION: 98 % | TEMPERATURE: 94.1 F | HEART RATE: 60 BPM

## 2021-07-25 DIAGNOSIS — N30.01 ACUTE CYSTITIS WITH HEMATURIA: Primary | ICD-10-CM

## 2021-07-25 LAB
BILIRUBIN, POC: ABNORMAL
BLOOD URINE, POC: ABNORMAL
CLARITY, POC: ABNORMAL
COLOR, POC: YELLOW
GLUCOSE URINE, POC: ABNORMAL
KETONES, POC: ABNORMAL
LEUKOCYTE EST, POC: ABNORMAL
NITRITE, POC: ABNORMAL
PH, POC: 5.5
PROTEIN, POC: ABNORMAL
SPECIFIC GRAVITY, POC: 1.02
UROBILINOGEN, POC: ABNORMAL

## 2021-07-25 PROCEDURE — 81002 URINALYSIS NONAUTO W/O SCOPE: CPT | Performed by: PHYSICIAN ASSISTANT

## 2021-07-25 PROCEDURE — 99213 OFFICE O/P EST LOW 20 MIN: CPT | Performed by: PHYSICIAN ASSISTANT

## 2021-07-25 RX ORDER — CIPROFLOXACIN 500 MG/1
500 TABLET, FILM COATED ORAL 2 TIMES DAILY
Qty: 20 TABLET | Refills: 0 | Status: SHIPPED | OUTPATIENT
Start: 2021-07-25 | End: 2021-08-04

## 2021-07-25 SDOH — ECONOMIC STABILITY: FOOD INSECURITY: WITHIN THE PAST 12 MONTHS, YOU WORRIED THAT YOUR FOOD WOULD RUN OUT BEFORE YOU GOT MONEY TO BUY MORE.: NEVER TRUE

## 2021-07-25 SDOH — ECONOMIC STABILITY: TRANSPORTATION INSECURITY
IN THE PAST 12 MONTHS, HAS LACK OF TRANSPORTATION KEPT YOU FROM MEETINGS, WORK, OR FROM GETTING THINGS NEEDED FOR DAILY LIVING?: NO

## 2021-07-25 SDOH — ECONOMIC STABILITY: FOOD INSECURITY: WITHIN THE PAST 12 MONTHS, THE FOOD YOU BOUGHT JUST DIDN'T LAST AND YOU DIDN'T HAVE MONEY TO GET MORE.: NEVER TRUE

## 2021-07-25 SDOH — ECONOMIC STABILITY: TRANSPORTATION INSECURITY
IN THE PAST 12 MONTHS, HAS THE LACK OF TRANSPORTATION KEPT YOU FROM MEDICAL APPOINTMENTS OR FROM GETTING MEDICATIONS?: NO

## 2021-07-25 ASSESSMENT — PATIENT HEALTH QUESTIONNAIRE - PHQ9
SUM OF ALL RESPONSES TO PHQ QUESTIONS 1-9: 0
SUM OF ALL RESPONSES TO PHQ QUESTIONS 1-9: 0
1. LITTLE INTEREST OR PLEASURE IN DOING THINGS: 0
2. FEELING DOWN, DEPRESSED OR HOPELESS: 0
SUM OF ALL RESPONSES TO PHQ QUESTIONS 1-9: 0
SUM OF ALL RESPONSES TO PHQ9 QUESTIONS 1 & 2: 0

## 2021-07-25 ASSESSMENT — ENCOUNTER SYMPTOMS
CHEST TIGHTNESS: 0
NAUSEA: 0
SINUS PRESSURE: 0
ABDOMINAL PAIN: 0
SINUS PAIN: 0
BACK PAIN: 0
COUGH: 0
VOMITING: 0
DIARRHEA: 0
SORE THROAT: 0
SHORTNESS OF BREATH: 0

## 2021-07-25 ASSESSMENT — VISUAL ACUITY: OU: 1

## 2021-07-25 ASSESSMENT — SOCIAL DETERMINANTS OF HEALTH (SDOH): HOW HARD IS IT FOR YOU TO PAY FOR THE VERY BASICS LIKE FOOD, HOUSING, MEDICAL CARE, AND HEATING?: NOT VERY HARD

## 2021-07-25 NOTE — PROGRESS NOTES
[facility] Encounter  CHIEF COMPLAINT       Chief Complaint   Patient presents with    Urinary Tract Infection     over night        HISTORY OF PRESENT ILLNESS   Chacho Lange is a 66 y.o. female who presents with:  Urinary Tract Infection   This is a new problem. Episode onset: Friday night. The problem occurs every urination. The problem has been unchanged. The quality of the pain is described as aching and burning. The pain is moderate. There has been no fever. There is no history of pyelonephritis. Associated symptoms include flank pain, frequency and urgency. Pertinent negatives include no chills, discharge, hematuria, hesitancy, nausea, possible pregnancy, sweats or vomiting. She has tried nothing for the symptoms. Her past medical history is significant for recurrent UTIs and a urological procedure. There is no history of catheterization, kidney stones, a single kidney or urinary stasis. REVIEW OF SYSTEMS     Review of Systems   Constitutional: Negative for activity change, appetite change, chills and fever. HENT: Negative for congestion, drooling, sinus pressure, sinus pain and sore throat. Eyes: Negative for visual disturbance. Respiratory: Negative for cough, chest tightness and shortness of breath. Cardiovascular: Negative for chest pain. Gastrointestinal: Negative for abdominal pain, diarrhea, nausea and vomiting. Endocrine: Negative for cold intolerance. Genitourinary: Positive for flank pain, frequency and urgency. Negative for dysuria, hematuria and hesitancy. Musculoskeletal: Negative for arthralgias and back pain. Skin: Negative for rash. Allergic/Immunologic: Negative for food allergies. Neurological: Negative for weakness, light-headedness, numbness and headaches. Hematological: Does not bruise/bleed easily.      PAST MEDICAL HISTORY         Diagnosis Date    Hyperlipidemia     meds > 5 yrs    Hypertension     meds > 5 yrs    Osteoarthritis     Prolapsed uterus      SURGICAL HISTORY     Patient  has a past surgical history that includes pr colonoscopy w/biopsy single/multiple (N/A, 4/10/2018); Breast surgery (Right); Colonoscopy; Endoscopy, colon, diagnostic; eye surgery; fracture surgery (Right); pr lap,vag hyst,uterus >250gms (N/A, 10/4/2018); pr release of ureter (N/A, 10/4/2018); and Vagina surgery (N/A, 10/4/2018). CURRENT MEDICATIONS       Previous Medications    ACETAMINOPHEN (APAP EXTRA STRENGTH) 500 MG TABLET    Take 2 tablets by mouth every 6 hours as needed for Pain    ASPIRIN 81 MG TABLET    Take by mouth daily     CALCIUM CITRATE-VITAMIN D (CALCIUM CITRATE + D3 PO)    Take by mouth 2 times daily     CHOLECALCIFEROL (VITAMIN D) 2000 UNITS CAPS CAPSULE    Take by mouth Daily with supper    DILTIAZEM (CARDIZEM CD) 240 MG ER CAPSULE    Take 1 capsule by mouth daily    FLUOCINOLONE (SYNALAR) 0.025 % OINTMENT    APPLY TO CHEEK AND LIPS THREE TO FOUR TIMES DAILY AS NEEDED. HYDROCHLOROTHIAZIDE (MICROZIDE) 12.5 MG CAPSULE    TAKE ONE CAPSULE BY MOUTH EVERY DAY IN THE MORNING    METOPROLOL SUCCINATE (TOPROL XL) 100 MG EXTENDED RELEASE TABLET    TAKE ONE TABLET BY MOUTH IN THE MORNING    POTASSIUM CHLORIDE (KLOR-CON) 10 MEQ EXTENDED RELEASE TABLET    TAKE ONE TABLET BY MOUTH TWO TIMES A DAY    PRAVASTATIN (PRAVACHOL) 20 MG TABLET    Take 1 tablet by mouth daily     ALLERGIES     Patient is is allergic to latex, bactrim [sulfamethoxazole-trimethoprim], codeine, and vicodin [hydrocodone-acetaminophen]. FAMILY HISTORY     Patient'sfamily history includes Asthma in her brother; COPD in her brother; Cancer in her brother; Heart Attack in her father; High Blood Pressure in her mother and son; Other in her father and son; Seizures in her son. SOCIAL HISTORY     Patient  reports that she has never smoked. She has never used smokeless tobacco. She reports current alcohol use. She reports that she does not use drugs.   PHYSICAL EXAM     VITALS  BP: 124/68, Temp: 94.1 °F (34.5 °C), Pulse: 60,  , SpO2: 98 %  Physical Exam  Vitals and nursing note reviewed. Constitutional:       General: She is awake. She is not in acute distress. Appearance: Normal appearance. She is well-developed. She is not ill-appearing, toxic-appearing or diaphoretic. HENT:      Head: Normocephalic and atraumatic. Right Ear: Hearing and external ear normal.      Left Ear: Hearing and external ear normal.      Nose: Nose normal.   Eyes:      General: Lids are normal. Vision grossly intact. Gaze aligned appropriately. Conjunctiva/sclera: Conjunctivae normal.   Cardiovascular:      Rate and Rhythm: Normal rate and regular rhythm. Pulses: Normal pulses. Heart sounds: Normal heart sounds, S1 normal and S2 normal.   Pulmonary:      Effort: Pulmonary effort is normal.      Breath sounds: Normal breath sounds and air entry. Musculoskeletal:      Cervical back: Normal range of motion. Skin:     General: Skin is warm. Capillary Refill: Capillary refill takes less than 2 seconds. Neurological:      Mental Status: She is alert and oriented to person, place, and time. Gait: Gait is intact. Psychiatric:         Attention and Perception: Attention normal.         Mood and Affect: Mood normal.         Speech: Speech normal.         Behavior: Behavior normal. Behavior is cooperative. READY CARE COURSE   Labs:  Results for POC orders placed in visit on 07/25/21   POCT Urinalysis no Micro   Result Value Ref Range    Color, UA Yellow     Clarity, UA Cloudy     Glucose, UA POC neg     Bilirubin, UA 1+     Ketones, UA neg     Spec Grav, UA 1.025     Blood, UA POC 2+     pH, UA 5.5     Protein, UA POC 1+     Urobilinogen, UA neg     Leukocytes, UA 3+     Nitrite, UA +      IMAGING:  No orders to display     Scheduled Meds:  Continuous Infusions:  PRN Meds:. PROCEDURES:  FINAL IMPRESSION      1.  Acute cystitis with hematuria DISPOSITION/PLAN   1. Recurrent UTI's. Last was in May of last year. Recommend F/u with Dr. Deena Pérez. Possible evaluation of patient's bladder sling or prolapse urethra, however, patient does not have irritative symptoms outside of her UTIs. Patient was started on Cipro. Will wait for cultures. Previous cultures have shown resistance to bactrim. Sensitive to augmentin and macrobid. Patient would like to start with Cipro. Patient is also having flank pain. Cipro will be better suited for this. On this date 7/25/2021 I have spent 20 minutes reviewing previous notes, test results and face to face with the patient discussing the diagnosis and importance of compliance with the treatment plan as well as documenting on the day of the visit. PATIENT REFERRED TO:  Return for Follow up with PCP. DISCHARGE MEDICATIONS:  New Prescriptions    CIPROFLOXACIN (CIPRO) 500 MG TABLET    Take 1 tablet by mouth 2 times daily for 10 days     Cannot display discharge medications since this is not an admission.        Romina Howard

## 2021-07-26 DIAGNOSIS — N30.01 ACUTE CYSTITIS WITH HEMATURIA: ICD-10-CM

## 2021-07-28 LAB
ORGANISM: ABNORMAL
URINE CULTURE, ROUTINE: ABNORMAL

## 2021-08-12 ENCOUNTER — TELEPHONE (OUTPATIENT)
Dept: UROLOGY | Age: 76
End: 2021-08-12

## 2021-08-12 DIAGNOSIS — N39.0 RECURRENT UTI: Primary | ICD-10-CM

## 2021-08-12 NOTE — TELEPHONE ENCOUNTER
The Cipro she took was supposed to be effective to the E. coli she grew out of her urine  It can take a few weeks for the bladder to heal up after a bad infection such as this 1  She can drop off a urine to check and see if she still has an infection for culture  Anais Bhagat MD

## 2021-08-12 NOTE — TELEPHONE ENCOUNTER
----- Message from [place] sent at 8/12/2021 10:50 AM EDT -----  Regarding: UTI symptoms  Pt went to the [address] [place] [address] on 7/25 for UTI symptoms. Pt took the CIPRO that was prescribed but is having pressure, burning, frequency, with no hematuria and no pain again. Pt is asking what to do next. Appt, urine drop off? If pt can't do anything today, pt will go to a [address] [place] [address] again. Pt uses [address].    Pt: [phone]

## 2021-08-13 ENCOUNTER — TELEPHONE (OUTPATIENT)
Dept: UROLOGY | Age: 76
End: 2021-08-13

## 2021-08-13 ENCOUNTER — NURSE ONLY (OUTPATIENT)
Dept: UROLOGY | Age: 76
End: 2021-08-13
Payer: MEDICARE

## 2021-08-13 DIAGNOSIS — R30.0 BURNING WITH URINATION: Primary | ICD-10-CM

## 2021-08-13 PROCEDURE — 81003 URINALYSIS AUTO W/O SCOPE: CPT | Performed by: UROLOGY

## 2021-08-13 NOTE — TELEPHONE ENCOUNTER
WOULD YOU LIKE A CULTURE SENT?         2021  8:20 AM - Dorethea Apgar, CMA    Component Collected Lab   Color, UA 2021  8:19 AM Unknown   YELLOW    Clarity, UA 2021  8:19 AM Unknown   CLEAR    Glucose, UA POC 2021  8:19 AM Unknown   NEG    Bilirubin, UA 2021  8:19 AM Unknown   NEG    Ketones, UA 2021  8:19 AM Unknown   NEG    Spec Grav, UA 2021  8:19 AM Unknown   1.020    Blood, UA POC 2021  8:19 AM Unknown   SMALL    pH, UA 2021  8:19 AM Unknown   6.5    Protein, UA POC 2021  8:19 AM Unknown   30 MG/DL    Urobilinogen, UA 2021  8:19 AM Unknown   0.2    Leukocytes, UA 2021  8:19 AM Unknown   SMALL    Nitrite, UA 2021  8:19 AM Unknown   NEG

## 2021-08-16 RX ORDER — NITROFURANTOIN 25; 75 MG/1; MG/1
100 CAPSULE ORAL 2 TIMES DAILY
Qty: 20 CAPSULE | Refills: 0 | Status: SHIPPED | OUTPATIENT
Start: 2021-08-16 | End: 2021-08-30

## 2021-08-30 ENCOUNTER — OFFICE VISIT (OUTPATIENT)
Dept: OBGYN CLINIC | Age: 76
End: 2021-08-30
Payer: MEDICARE

## 2021-08-30 VITALS
WEIGHT: 129 LBS | BODY MASS INDEX: 25.32 KG/M2 | DIASTOLIC BLOOD PRESSURE: 64 MMHG | HEART RATE: 60 BPM | HEIGHT: 60 IN | SYSTOLIC BLOOD PRESSURE: 138 MMHG

## 2021-08-30 DIAGNOSIS — N39.0 RECURRENT UTI: Primary | ICD-10-CM

## 2021-08-30 PROCEDURE — 99213 OFFICE O/P EST LOW 20 MIN: CPT | Performed by: OBSTETRICS & GYNECOLOGY

## 2021-08-30 RX ORDER — CEPHALEXIN 250 MG/1
250 CAPSULE ORAL NIGHTLY PRN
Qty: 30 CAPSULE | Refills: 6 | Status: SHIPPED | OUTPATIENT
Start: 2021-08-30 | End: 2021-11-28 | Stop reason: ALTCHOICE

## 2021-08-30 NOTE — PROGRESS NOTES
Iman Riley is a 66 y.o. female who presents here today for complaints of recurrent UTIs. Patient is status post LAPARSCOPIC HYSTERECTOMY with vaginal SSLS  A-P REPAIR WITH TOT OBTRYX, CYSTOSCOPY  done back in October 2018. For COMPLETE UTERINE PROLAPSE, grade 3-4 cystocele , grade 2-3 rectocele and MATTY stress urinary incontinence with urethral hypermobility. Patient still satisfied with postprocedure results including complete resolution of urinary leakage and prolapse. Patient does mention previous history of prior to her procedure of recurrent urinary tract infections which tend to resolve after the procedure but patient does mention that approximately 1 year after the procedure she started experiencing recurrent urinary tract infections, she had suffered from 3 episodes of 4 episodes over the past 6 months. Patient is here to see whether her recurrent urinary tract infections she is experiencing currently are due to any effects post procedure. Patient currently is not experiencing any urinary symptoms. That has been treated recently for UTI. Patient with history of positive urine cultures for E. coli, Klebsiella, MRSA on different urine culture results occasions. Vitals:  /64 (Site: Left Upper Arm, Position: Sitting, Cuff Size: Medium Adult)   Pulse 60   Ht 5' (1.524 m)   Wt 129 lb (58.5 kg)   LMP 09/27/1996   BMI 25.19 kg/m²   Allergies:  Latex, Bactrim [sulfamethoxazole-trimethoprim], Codeine, and Vicodin [hydrocodone-acetaminophen]  Past Medical History:   Diagnosis Date    Hyperlipidemia     meds > 5 yrs    Hypertension     meds > 5 yrs    Osteoarthritis     Prolapsed uterus      Past Surgical History:   Procedure Laterality Date    BREAST SURGERY Right     fibrocystic breast /Bx / benign    COLONOSCOPY      ENDOSCOPY, COLON, DIAGNOSTIC      EYE SURGERY      phaco with IOL OU.     FRACTURE SURGERY Right     due to fracture / had skeletal hardware    [place] COLONOSCOPY W/BIOPSY SINGLE/MULTIPLE N/A 4/10/2018    COLONOSCOPY WITH BIOPSYS performed by Jovani Olivares MD at [facility] 94 >250GMS N/A 10/4/2018    LAPARSCOPIC HYSTERECTOMY performed by Rosa Maria Kelley MD at [address] N/A 10/4/2018    USLS/ SSLS performed by Rosa Maria Kelley MD at [address] [facility] N/A 10/4/2018    A-P REPAIR WITH TOT OBTRYX and SLING , CYSTOSCOPY performed by Rosa Maria Kelley MD at [facility]     OB History    No obstetric history on file. Family History   Problem Relation Age of Onset    High Blood Pressure Mother     Other Father         DVTs 1960    Heart Attack Father     Asthma Brother     Cancer Brother         bladder cancer    COPD Brother     High Blood Pressure Son     Other Son         slow IQ    Seizures Son      Social History     Socioeconomic History    Marital status:      Spouse name: Not on file    Number of children: Not on file    Years of education: Not on file    Highest education level: Not on file   Occupational History    Not on file   Tobacco Use    Smoking status: Never Smoker    Smokeless tobacco: Never Used   Substance and Sexual Activity    Alcohol use: Yes     Alcohol/week: 0.0 standard drinks     Comment: rare social    Drug use: No    Sexual activity: Not on file     Comment:    Other Topics Concern    Not on file   Social History Narrative    Not on file     Social Determinants of Health     Financial Resource Strain: Low Risk     Difficulty of Paying Living Expenses: Not very hard   Food Insecurity: No Food Insecurity    Worried About Running Out of Food in the Last Year: Never true    Deborah of Food in the Last Year: Never true   Transportation Needs: No Transportation Needs    Lack of Transportation (Medical): No    Lack of Transportation (Non-Medical):  No   Physical Activity:     Days of Exercise per Week:     Minutes of Exercise per Session:    Stress:     Feeling of Stress :    Social Connections:     Frequency of Communication with Friends and Family:     Frequency of Social Gatherings with Friends and Family:     Attends [employer] Services:     Active Member of Clubs or Organizations:     Attends Club or Organization Meetings:     Marital Status:    Intimate Partner Violence:     Fear of Current or Ex-Partner:     Emotionally Abused:     Physically Abused:     Sexually Abused:        Contraceptive method:  none    Patient's medications, allergies, past medical, surgical, social and family histories were reviewed and updated as appropriate. Review of Systems  As per chief complaint   All other systems reviewed and are negative. Recurrent urinary tract infections  Physical Exam:  Vitals:  /64 (Site: Left Upper Arm, Position: Sitting, Cuff Size: Medium Adult)   Pulse 60   Ht 5' (1.524 m)   Wt 129 lb (58.5 kg)   LMP 09/27/1996   BMI 25.19 kg/m²   Lungs: CTAB   Heart : Regular S1/S2, no M/R/G  Abdomen: Soft , NT, ND , + BS   Pelvic exam : deferred    Assessment:      Diagnosis Orders   1. Recurrent UTI  Urinalysis    Culture, Urine       Plan:     I did explain to the patient that she has had a previous history of recurrent urinary tract infections even prior to the procedure which she mentions has improved post procedure and is very unlikely for her to be infection free for almost 1 year after the procedure on suddenly her urinary tract infections have recurred, possibility of the procedure as cause of her recurrence is very unlikely. 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   I did advise the patient to start using daily low-dose antibiotic suppressive treatment for prevention of recurrent urinary tract infection and to observe the frequency of infections would decrease . Generally patient was reassured and started on Keflex on a 250 mg p.o. nightly  Urine will be sent for analysis and culture  Patient follow-up in 3 to 6-month or as needed. Orders Placed This Encounter   Procedures    Culture, Urine     Standing Status:   Future     Standing Expiration Date:   8/27/2022     Order Specific Question:   Specify (ex-cath, midstream, cysto, etc)? Answer:   midstream    Urinalysis     Standing Status:   Future     Standing Expiration Date:   8/27/2022     Orders Placed This Encounter   Medications    cephALEXin (KEFLEX) 250 MG capsule     Sig: Take 1 capsule by mouth nightly as needed (recurrent UTI)     Dispense:  30 capsule     Refill:  6       Follow Up:  Return in about 3 months (around 11/30/2021) for medication assessment, F/U for results.         Surinder Marino MD

## 2021-10-26 ENCOUNTER — TELEPHONE (OUTPATIENT)
Dept: OBGYN CLINIC | Age: 76
End: 2021-10-26

## 2021-10-26 DIAGNOSIS — R35.0 URINARY FREQUENCY: Primary | ICD-10-CM

## 2021-10-26 DIAGNOSIS — R39.15 URINARY URGENCY: ICD-10-CM

## 2021-10-26 DIAGNOSIS — R35.0 URINARY FREQUENCY: ICD-10-CM

## 2021-10-26 LAB
BACTERIA: ABNORMAL /HPF
BILIRUBIN URINE: NEGATIVE
BLOOD, URINE: NEGATIVE
CLARITY: ABNORMAL
COLOR: YELLOW
EPITHELIAL CELLS, UA: ABNORMAL /HPF (ref 0–5)
GLUCOSE URINE: NEGATIVE MG/DL
HYALINE CASTS: ABNORMAL /HPF (ref 0–5)
KETONES, URINE: NEGATIVE MG/DL
LEUKOCYTE ESTERASE, URINE: ABNORMAL
NITRITE, URINE: POSITIVE
PH UA: 7.5 (ref 5–9)
PROTEIN UA: ABNORMAL MG/DL
RBC UA: ABNORMAL /HPF (ref 0–5)
SPECIFIC GRAVITY UA: 1.02 (ref 1–1.03)
UROBILINOGEN, URINE: 1 E.U./DL
WBC UA: >100 /HPF (ref 0–5)
YEAST: PRESENT /HPF

## 2021-10-26 RX ORDER — NITROFURANTOIN 25; 75 MG/1; MG/1
100 CAPSULE ORAL 2 TIMES DAILY
Qty: 20 CAPSULE | Refills: 0 | Status: SHIPPED | OUTPATIENT
Start: 2021-10-26 | End: 2021-11-05

## 2021-10-26 NOTE — TELEPHONE ENCOUNTER
Cristian Nichols dropped off urine. She's complaining of strong urge to urinate every 2-2 1/2 hours. She states this happens during the day and at night. Urine sent for UA and culture. Dip in office positive for nitrites.

## 2021-10-29 LAB
ORGANISM: ABNORMAL
URINE CULTURE, ROUTINE: ABNORMAL

## 2021-10-29 RX ORDER — CIPROFLOXACIN 500 MG/1
500 TABLET, FILM COATED ORAL 2 TIMES DAILY
Qty: 20 TABLET | Refills: 0 | Status: SHIPPED | OUTPATIENT
Start: 2021-10-29 | End: 2021-11-08

## 2021-11-09 ENCOUNTER — OFFICE VISIT (OUTPATIENT)
Dept: OBGYN CLINIC | Age: 76
End: 2021-11-09
Payer: MEDICARE

## 2021-11-09 VITALS
DIASTOLIC BLOOD PRESSURE: 62 MMHG | SYSTOLIC BLOOD PRESSURE: 134 MMHG | BODY MASS INDEX: 26.11 KG/M2 | WEIGHT: 133 LBS | HEIGHT: 60 IN | HEART RATE: 60 BPM

## 2021-11-09 DIAGNOSIS — N39.0 RECURRENT UTI: ICD-10-CM

## 2021-11-09 DIAGNOSIS — N39.0 RECURRENT UTI: Primary | ICD-10-CM

## 2021-11-09 LAB
BACTERIA: NEGATIVE /HPF
BILIRUBIN URINE: NEGATIVE
BLOOD, URINE: NEGATIVE
CLARITY: CLEAR
COLOR: YELLOW
EPITHELIAL CELLS, UA: >100 /HPF (ref 0–5)
GLUCOSE URINE: NEGATIVE MG/DL
KETONES, URINE: ABNORMAL MG/DL
LEUKOCYTE ESTERASE, URINE: ABNORMAL
NITRITE, URINE: NEGATIVE
PH UA: 6 (ref 5–9)
PROTEIN UA: NEGATIVE MG/DL
RBC UA: ABNORMAL /HPF (ref 0–5)
SPECIFIC GRAVITY UA: 1.02 (ref 1–1.03)
UROBILINOGEN, URINE: 0.2 E.U./DL
WBC UA: ABNORMAL /HPF (ref 0–5)

## 2021-11-09 PROCEDURE — 99213 OFFICE O/P EST LOW 20 MIN: CPT | Performed by: OBSTETRICS & GYNECOLOGY

## 2021-11-09 RX ORDER — CIPROFLOXACIN 250 MG/1
125 TABLET, FILM COATED ORAL NIGHTLY
Qty: 15 TABLET | Refills: 3 | Status: SHIPPED | OUTPATIENT
Start: 2021-11-09 | End: 2021-12-09

## 2021-11-09 RX ORDER — PRAVASTATIN SODIUM 40 MG
TABLET ORAL
COMMUNITY
Start: 2021-11-08

## 2021-11-09 NOTE — PROGRESS NOTES
Latisha Saini is a 66 y.o. female who presents here today for complaints of Follow-up (UTI.)    Recent UTI resistent to many antibiotics. Patient with history of recurrent uti, started on keflex 250 mg QHS last visit for suppression therapy . Allergic to bactrim . Organism Morganella morganii ssp morganii Abnormal     Urine Culture, Routine >100,000 CFU/ml    Resulting Agency [address] [facility]          Susceptibility     Morganella morganii ssp morganii     BACTERIAL SUSCEPTIBILITY PANEL BY MICHAEL     amoxicillin-clavulanate >=32 mcg/mL Resistant     ampicillin >=32 mcg/mL Resistant     ceFAZolin >=64 mcg/mL Resistant     cefTRIAXone <=1 mcg/mL Sensitive     ciprofloxacin <=0.25 mcg/mL Sensitive     gentamicin <=1 mcg/mL Sensitive     nitrofurantoin 128 mcg/mL Resistant     trimethoprim-sulfamethoxazole <=20 mcg/mL Sensitive                   Currently being treated with ciprofloxacin . Denies any urinary symptoms at this time. .      Vitals:  /62 (Site: Right Upper Arm, Position: Sitting, Cuff Size: Medium Adult)   Pulse 60   Ht 5' (1.524 m)   Wt 133 lb (60.3 kg)   LMP 09/27/1996   BMI 25.97 kg/m²   Allergies:  Latex, Bactrim [sulfamethoxazole-trimethoprim], Codeine, and Vicodin [hydrocodone-acetaminophen]  Past Medical History:   Diagnosis Date    Hyperlipidemia     meds > 5 yrs    Hypertension     meds > 5 yrs    Osteoarthritis     Prolapsed uterus      Past Surgical History:   Procedure Laterality Date    BREAST SURGERY Right     fibrocystic breast /Bx / benign    COLONOSCOPY      ENDOSCOPY, COLON, DIAGNOSTIC      EYE SURGERY      phaco with IOL OU.     FRACTURE SURGERY Right     due to fracture / had skeletal hardware    [place] COLONOSCOPY W/BIOPSY SINGLE/MULTIPLE N/A 4/10/2018    COLONOSCOPY WITH BIOPSYS performed by Gallo Avalos MD at [facility] >250GMS N/A 10/4/2018    LAPARSCOPIC HYSTERECTOMY performed by Robert Correa MD at [place] [address]  Active Member of Clubs or Organizations: Not on file    Attends Club or Organization Meetings: Not on file    Marital Status: Not on file   Intimate Partner Violence:     Fear of Current or Ex-Partner: Not on file    Emotionally Abused: Not on file    Physically Abused: Not on file    Sexually Abused: Not on file   Housing Stability:     Unable to Pay for Housing in the Last Year: Not on file    Number of [place] in the Last Year: Not on file    Unstable Housing in the Last Year: Not on file       Contraceptive method:  none    Patient's medications, allergies, past medical, surgical, social and family histories were reviewed and updated as appropriate. Review of Systems  As per chief complaint   All other systems reviewed and are negative. Physical Exam:  Vitals:  /62 (Site: Right Upper Arm, Position: Sitting, Cuff Size: Medium Adult)   Pulse 60   Ht 5' (1.524 m)   Wt 133 lb (60.3 kg)   LMP 09/27/1996   BMI 25.97 kg/m²   Lungs: CTAB   Heart : Regular S1/S2, no M/R/G  Abdomen: Soft , NT, ND , + BS   Pelvic exam : deferred    Assessment:      Diagnosis Orders   1. Recurrent UTI  Urinalysis    Culture, Urine       Plan:     Complete ciprofloxacin for treatment of recent resistent UTI   Change suppression therapy to very low dose ciprofloxacin 125 mg po QHS   Send urine for UA AND CULTURE   Return in 2 mnth . Orders Placed This Encounter   Procedures    Culture, Urine     Standing Status:   Future     Number of Occurrences:   1     Standing Expiration Date:   11/9/2022     Order Specific Question:   Specify (ex-cath, midstream, cysto, etc)?      Answer:   midstream    Urinalysis     Standing Status:   Future     Number of Occurrences:   1     Standing Expiration Date:   11/9/2022     Orders Placed This Encounter   Medications    ciprofloxacin (CIPRO) 250 MG tablet     Sig: Take 0.5 tablets by mouth nightly     Dispense:  15 tablet     Refill:  3       Follow Up:  Return in about 2 months (around 1/9/2022) for F/U for results, f/u recurrent UTI .         Aline Dumont MD

## 2021-11-11 LAB — URINE CULTURE, ROUTINE: NORMAL

## 2021-11-28 ENCOUNTER — OFFICE VISIT (OUTPATIENT)
Dept: FAMILY MEDICINE CLINIC | Age: 76
End: 2021-11-28
Payer: MEDICARE

## 2021-11-28 VITALS
TEMPERATURE: 96.4 F | WEIGHT: 133 LBS | DIASTOLIC BLOOD PRESSURE: 80 MMHG | HEIGHT: 60 IN | HEART RATE: 74 BPM | BODY MASS INDEX: 26.11 KG/M2 | SYSTOLIC BLOOD PRESSURE: 130 MMHG | OXYGEN SATURATION: 99 %

## 2021-11-28 DIAGNOSIS — R35.0 FREQUENCY OF URINATION: Primary | ICD-10-CM

## 2021-11-28 DIAGNOSIS — R35.0 FREQUENCY OF URINATION: ICD-10-CM

## 2021-11-28 LAB
BILIRUBIN, POC: NEGATIVE
BLOOD URINE, POC: 80
CLARITY, POC: ABNORMAL
COLOR, POC: ABNORMAL
GLUCOSE URINE, POC: NEGATIVE
KETONES, POC: NEGATIVE
LEUKOCYTE EST, POC: 500
NITRITE, POC: NEGATIVE
PH, POC: 6
PROTEIN, POC: 0.15
SPECIFIC GRAVITY, POC: 1.03
UROBILINOGEN, POC: 3.5

## 2021-11-28 PROCEDURE — 99213 OFFICE O/P EST LOW 20 MIN: CPT

## 2021-11-28 PROCEDURE — 81003 URINALYSIS AUTO W/O SCOPE: CPT

## 2021-11-28 RX ORDER — CIPROFLOXACIN 250 MG/1
250 TABLET, FILM COATED ORAL 2 TIMES DAILY
Qty: 14 TABLET | Refills: 0 | Status: SHIPPED | OUTPATIENT
Start: 2021-11-28 | End: 2021-12-15 | Stop reason: SDUPTHER

## 2021-11-28 ASSESSMENT — ENCOUNTER SYMPTOMS
CHEST TIGHTNESS: 0
VOMITING: 0
NAUSEA: 0
ABDOMINAL DISTENTION: 0
ABDOMINAL PAIN: 0
BACK PAIN: 0
COLOR CHANGE: 0
DIARRHEA: 0
SHORTNESS OF BREATH: 0

## 2021-11-28 NOTE — PROGRESS NOTES
[address] Encounter  CHIEF COMPLAINT       Chief Complaint   Patient presents with    Urinary Tract Infection     Frequency, Urgency, sometimes unable to make it to restroom in time  tx: 1/2 tab cipro nightly        HISTORY OF PRESENT ILLNESS   Gerhardt Ally is a 66 y.o. female who presents with:  HPI  Patient reporting urinary frequency and pain with urination starting this past Wednesday. She is being treated by Dr. Adam Tracy for recurrent urinary tract infections. Last active infection was the beginning of November. She is currently on Cipro prophylaxis  REVIEW OF SYSTEMS     Review of Systems   Constitutional: Negative for activity change, appetite change, chills, diaphoresis, fatigue and fever. Eyes: Negative for visual disturbance. Respiratory: Negative for chest tightness and shortness of breath. Cardiovascular: Negative for chest pain and leg swelling. Gastrointestinal: Negative for abdominal distention, abdominal pain, diarrhea, nausea and vomiting. Endocrine: Negative for cold intolerance and heat intolerance. Genitourinary: Positive for dysuria and frequency. Negative for decreased urine volume, difficulty urinating, flank pain, hematuria and urgency. Musculoskeletal: Negative for back pain and myalgias. Skin: Negative for color change, pallor and rash. Neurological: Negative for dizziness, weakness, light-headedness and headaches. Psychiatric/Behavioral: Negative for confusion, dysphoric mood and hallucinations. PAST MEDICAL HISTORY         Diagnosis Date    Hyperlipidemia     meds > 5 yrs    Hypertension     meds > 5 yrs    Osteoarthritis     Prolapsed uterus      SURGICAL HISTORY     Patient  has a past surgical history that includes pr colonoscopy w/biopsy single/multiple (N/A, 4/10/2018); Breast surgery (Right);  Colonoscopy; Endoscopy, colon, diagnostic; eye surgery; fracture surgery (Right); pr lap,vag hyst,uterus >250gms (N/A, 10/4/2018); pr release of ureter (N/A, 10/4/2018); and Vagina surgery (N/A, 10/4/2018). CURRENT MEDICATIONS       Previous Medications    ACETAMINOPHEN (APAP EXTRA STRENGTH) 500 MG TABLET    Take 2 tablets by mouth every 6 hours as needed for Pain    ASPIRIN 81 MG TABLET    Take by mouth daily     CALCIUM CITRATE-VITAMIN D (CALCIUM CITRATE + D3 PO)    Take by mouth 2 times daily     CHOLECALCIFEROL (VITAMIN D) 2000 UNITS CAPS CAPSULE    Take by mouth Daily with supper    CIPROFLOXACIN (CIPRO) 250 MG TABLET    Take 0.5 tablets by mouth nightly    DILTIAZEM (CARDIZEM CD) 240 MG ER CAPSULE    Take 1 capsule by mouth daily    HYDROCHLOROTHIAZIDE (MICROZIDE) 12.5 MG CAPSULE    TAKE ONE CAPSULE BY MOUTH EVERY DAY IN THE MORNING    METOPROLOL SUCCINATE (TOPROL XL) 100 MG EXTENDED RELEASE TABLET    TAKE ONE TABLET BY MOUTH IN THE MORNING    POTASSIUM CHLORIDE (KLOR-CON) 10 MEQ EXTENDED RELEASE TABLET    TAKE ONE TABLET BY MOUTH TWO TIMES A DAY    PRAVASTATIN (PRAVACHOL) 40 MG TABLET         ALLERGIES     Patient is is allergic to latex, bactrim [sulfamethoxazole-trimethoprim], codeine, and vicodin [hydrocodone-acetaminophen]. FAMILY HISTORY     Patient'sfamily history includes Asthma in her brother; COPD in her brother; Cancer in her brother; Heart Attack in her father; High Blood Pressure in her mother and son; Other in her father and son; Seizures in her son. HISTORY     Patient  reports that she has never smoked. She has never used smokeless tobacco. She reports current alcohol use. She reports that she does not use drugs. PHYSICAL EXAM     VITALS  BP: 130/80, Temp: 96.4 °F (35.8 °C), Pulse: 74,  , SpO2: 99 %  Physical Exam  Constitutional:       General: She is not in acute distress. Appearance: Normal appearance. She is not ill-appearing, toxic-appearing or diaphoretic. HENT:      Head: Normocephalic.       Right Ear: External ear normal.      Left Ear: External ear normal.      Nose: Nose normal. No congestion or rhinorrhea. Mouth/Throat:      Mouth: Mucous membranes are moist.      Pharynx: Oropharynx is clear. Eyes:      General:         Right eye: No discharge. Left eye: No discharge. Conjunctiva/sclera: Conjunctivae normal.   Cardiovascular:      Rate and Rhythm: Normal rate and regular rhythm. Pulses: Normal pulses. Heart sounds: Normal heart sounds. Pulmonary:      Effort: Pulmonary effort is normal. No respiratory distress. Breath sounds: Normal breath sounds. Abdominal:      General: Abdomen is flat. Bowel sounds are normal. There is no distension. Palpations: Abdomen is soft. Tenderness: There is no abdominal tenderness. There is no right CVA tenderness, left CVA tenderness or guarding. Musculoskeletal:         General: No tenderness. Normal range of motion. Cervical back: Normal range of motion and neck supple. Skin:     General: Skin is warm and dry. Capillary Refill: Capillary refill takes less than 2 seconds. Coloration: Skin is not jaundiced or pale. Findings: No bruising or rash. Neurological:      General: No focal deficit present. Mental Status: She is alert and oriented to person, place, and time. Mental status is at baseline. Motor: No weakness. Psychiatric:         Mood and Affect: Mood normal.         Behavior: Behavior normal.         Judgment: Judgment normal.       READY CARE COURSE     Orders Placed This Encounter   Procedures    Culture, Urine     Standing Status:   Future     Standing Expiration Date:   11/28/2022     Order Specific Question:   Specify (ex-cath, midstream, cysto, etc)?      Answer:   Midstream    POCT Urinalysis No Micro (Auto)        Labs:  Results for POC orders placed in visit on 11/28/21   POCT Urinalysis No Micro (Auto)   Result Value Ref Range    Color, UA Pale Yellow     Clarity, UA Cloudy     Glucose, UA POC Negative     Bilirubin, UA Negative     Ketones, UA Negative Spec Grav, UA 1.030     Blood, UA POC 80     pH, UA 6.0     Protein, UA POC 0.15     Urobilinogen, UA 3.5     Leukocytes,      Nitrite, UA Negative      IMAGING:  No orders to display     Scheduled Meds:  Continuous Infusions:  PRN Meds:. PROCEDURES:  FINAL IMPRESSION      1. Frequency of urination        DISPOSITION/PLAN     HISTORY OF PRESENT ILLNESS   Robert Diaz is a 66 y.o. female who presents with pain with urination and frequency starting Thursday. Currently on Cipro prophylaxis for UTIs. Pt is afebrile has nontoxic appearance and VS are stable. On exam patient denies pain in the abdomen, denies pain with percussion over the bilateral flanks. POC urine analysis significant for leukocyte Estrace and hematuria. Advised patient to begin taking increased dose of Cipro for the next 7 days. Follow-up with Dr. Celina Chavira as soon as an appointment can be made. She will call tomorrow for an appointment. She will continue with Cipro prophylaxis dose after  7 days unless otherwise directed by Dr. Celina Chavira. Educated to increase water intake and refrain from sugary foods and beverages. PATIENT REFERRED TO:  Return Call for an appointment with Dr Teresa Pike. DISCHARGE MEDICATIONS:  New Prescriptions    CIPROFLOXACIN (CIPRO) 250 MG TABLET    Take 1 tablet by mouth 2 times daily for 7 days     Cannot display discharge medications since this is not an admission.        SUSY Ackerman - CNP

## 2021-11-28 NOTE — PATIENT INSTRUCTIONS
Begin taking Cipro  250 mg twice daily for the next 7 days. Restart once daily dose after completion of 7 days.

## 2021-11-29 ENCOUNTER — TELEPHONE (OUTPATIENT)
Dept: OBGYN CLINIC | Age: 76
End: 2021-11-29

## 2021-11-29 NOTE — TELEPHONE ENCOUNTER
Patient states that she was seen at the walk in clinic yesterday for frequent urination, she was given 250 mg Cipro for 7 days 2x daily. She states that she was told to stop her nightly pill of Cipro that she was taking until she is done with the new Rx.  Patient wants to know if this is okay and does she need to be seen By Dr. Hakeem Koehler

## 2021-11-30 LAB — URINE CULTURE, ROUTINE: NORMAL

## 2021-12-14 DIAGNOSIS — N39.0 RECURRENT UTI: ICD-10-CM

## 2021-12-14 DIAGNOSIS — N39.0 RECURRENT UTI: Primary | ICD-10-CM

## 2021-12-15 ENCOUNTER — TELEPHONE (OUTPATIENT)
Dept: OBGYN CLINIC | Age: 76
End: 2021-12-15

## 2021-12-15 DIAGNOSIS — N39.0 RECURRENT UTI: Primary | ICD-10-CM

## 2021-12-15 LAB
BACTERIA: ABNORMAL /HPF
BILIRUBIN URINE: NEGATIVE
BLOOD, URINE: ABNORMAL
CLARITY: ABNORMAL
COLOR: YELLOW
EPITHELIAL CELLS, UA: ABNORMAL /HPF (ref 0–5)
GLUCOSE URINE: NEGATIVE MG/DL
HYALINE CASTS: ABNORMAL /HPF (ref 0–5)
KETONES, URINE: 15 MG/DL
LEUKOCYTE ESTERASE, URINE: ABNORMAL
NITRITE, URINE: NEGATIVE
PH UA: 8 (ref 5–9)
PROTEIN UA: 30 MG/DL
RBC UA: ABNORMAL /HPF (ref 0–5)
SPECIFIC GRAVITY UA: 1.02 (ref 1–1.03)
UROBILINOGEN, URINE: 0.2 E.U./DL
WBC UA: >100 /HPF (ref 0–5)

## 2021-12-15 RX ORDER — CIPROFLOXACIN 250 MG/1
TABLET, FILM COATED ORAL
Qty: 50 TABLET | Refills: 0 | Status: SHIPPED | OUTPATIENT
Start: 2021-12-15 | End: 2022-02-23

## 2021-12-15 RX ORDER — CEPHALEXIN 500 MG/1
500 CAPSULE ORAL 4 TIMES DAILY
Qty: 40 CAPSULE | Refills: 0 | Status: SHIPPED | OUTPATIENT
Start: 2021-12-15 | End: 2022-06-07

## 2021-12-15 NOTE — TELEPHONE ENCOUNTER
Pt calling , wanted results from UA/ CS. Pt was advised this is not completed yet. She c/o frequency and urgency with a bit of external burning after urination. She currently takes Cipro nightly 1/2 of 250 mg . She has an allergy to Bactrim. Please advise and call pt if something is called in prior to results.

## 2021-12-17 LAB
ORGANISM: ABNORMAL
URINE CULTURE, ROUTINE: ABNORMAL

## 2021-12-17 RX ORDER — DOXYCYCLINE HYCLATE 100 MG
100 TABLET ORAL 2 TIMES DAILY
Qty: 20 TABLET | Refills: 0 | Status: SHIPPED | OUTPATIENT
Start: 2021-12-17 | End: 2021-12-27

## 2021-12-20 ENCOUNTER — TELEPHONE (OUTPATIENT)
Dept: OBGYN CLINIC | Age: 76
End: 2021-12-20

## 2021-12-28 DIAGNOSIS — R35.0 URINARY FREQUENCY: ICD-10-CM

## 2021-12-28 DIAGNOSIS — R39.15 URINARY URGENCY: ICD-10-CM

## 2021-12-28 DIAGNOSIS — N39.0 RECURRENT UTI: Primary | ICD-10-CM

## 2021-12-28 DIAGNOSIS — N39.0 RECURRENT UTI: ICD-10-CM

## 2021-12-28 LAB
BILIRUBIN URINE: NEGATIVE
BLOOD, URINE: NEGATIVE
CLARITY: CLEAR
COLOR: YELLOW
GLUCOSE URINE: NEGATIVE MG/DL
KETONES, URINE: NEGATIVE MG/DL
LEUKOCYTE ESTERASE, URINE: ABNORMAL
NITRITE, URINE: NEGATIVE
PH UA: 6 (ref 5–9)
PROTEIN UA: NEGATIVE MG/DL
SPECIFIC GRAVITY UA: 1.01 (ref 1–1.03)
UROBILINOGEN, URINE: 0.2 E.U./DL

## 2021-12-29 LAB
BACTERIA: NEGATIVE /HPF
EPITHELIAL CELLS, UA: ABNORMAL /HPF (ref 0–5)
HYALINE CASTS: ABNORMAL /HPF (ref 0–5)
RBC UA: ABNORMAL /HPF (ref 0–5)
WBC UA: ABNORMAL /HPF (ref 0–5)

## 2021-12-30 LAB — URINE CULTURE, ROUTINE: NORMAL

## 2022-01-10 ENCOUNTER — OFFICE VISIT (OUTPATIENT)
Dept: OBGYN CLINIC | Age: 77
End: 2022-01-10
Payer: MEDICARE

## 2022-01-10 VITALS
HEIGHT: 60 IN | SYSTOLIC BLOOD PRESSURE: 138 MMHG | BODY MASS INDEX: 25.91 KG/M2 | HEART RATE: 68 BPM | WEIGHT: 132 LBS | DIASTOLIC BLOOD PRESSURE: 66 MMHG

## 2022-01-10 DIAGNOSIS — N39.0 RECURRENT UTI: Primary | ICD-10-CM

## 2022-01-10 PROCEDURE — 99213 OFFICE O/P EST LOW 20 MIN: CPT | Performed by: OBSTETRICS & GYNECOLOGY

## 2022-02-07 ENCOUNTER — OFFICE VISIT (OUTPATIENT)
Dept: OBGYN CLINIC | Age: 77
End: 2022-02-07
Payer: MEDICARE

## 2022-02-07 VITALS
BODY MASS INDEX: 26.5 KG/M2 | SYSTOLIC BLOOD PRESSURE: 134 MMHG | DIASTOLIC BLOOD PRESSURE: 60 MMHG | WEIGHT: 135 LBS | HEART RATE: 72 BPM | HEIGHT: 60 IN

## 2022-02-07 DIAGNOSIS — N39.0 RECURRENT UTI: Primary | ICD-10-CM

## 2022-02-07 PROCEDURE — 99213 OFFICE O/P EST LOW 20 MIN: CPT | Performed by: OBSTETRICS & GYNECOLOGY

## 2022-02-07 RX ORDER — CIPROFLOXACIN 250 MG/1
125 TABLET, FILM COATED ORAL NIGHTLY PRN
Qty: 60 TABLET | Refills: 1 | Status: SHIPPED | OUTPATIENT
Start: 2022-02-07 | End: 2022-02-17

## 2022-02-07 NOTE — PROGRESS NOTES
Medication FollowUp     Elham Avila is a 64y.o. year old female here todiscuss symptoms after use of medications prescribed last visit. Symptoms recurrent urinary tract infections. Medication/s prescribed ciprofloxacin 1.5 mg p.o. nightly. Side effects reported : none. Mentions symptomsimproved : yes -patient very happy because she mentions that she has never went through the similar stretch of time with no urinary infections according to her description. Vitals:  /60 (Site: Right Upper Arm, Position: Sitting, Cuff Size: Medium Adult)   Pulse 72   Ht 5' (1.524 m)   Wt 135 lb (61.2 kg)   LMP 09/27/1996   BMI 26.37 kg/m²   Allergies:  Latex, Bactrim [sulfamethoxazole-trimethoprim], Codeine, and Vicodin [hydrocodone-acetaminophen]  Past Medical History:   Diagnosis Date    Hyperlipidemia     meds > 5 yrs    Hypertension     meds > 5 yrs    Osteoarthritis     Prolapsed uterus         Past Surgical History:   Procedure Laterality Date    BREAST SURGERY Right     fibrocystic breast /Bx / benign    COLONOSCOPY      ENDOSCOPY, COLON, DIAGNOSTIC      EYE SURGERY      phaco with IOL OU.  FRACTURE SURGERY Right     due to fracture / had skeletal hardware    [place] COLONOSCOPY W/BIOPSY SINGLE/MULTIPLE N/A 4/10/2018    COLONOSCOPY WITH BIOPSYS performed by Tamara Yeboah MD at [facility] >250GMS N/A 10/4/2018    LAPARSCOPIC HYSTERECTOMY performed by Harpal Menendez MD at [address] [facility] N/A 10/4/2018    USLS/ SSLS performed by Harpal Menendez MD at [facility] N/A 10/4/2018    A-P REPAIR WITH TOT OBTRYX and SLING , CYSTOSCOPY performed by Harpal [facility]endez MD at [facility]     OB History    No obstetric history on file.        Family History   Problem Relation Age of Onset    High Blood Pressure Mother     Other Father         DVTs 1960    Heart Attack Father     Asthma Brother     Cancer Brother         bladder cancer    COPD Brother     High Blood Pressure Son     Other Son         slow IQ    Seizures Son      Social History     Socioeconomic History    Marital status:      Spouse name: Not on file    Number of children: Not on file    Years of education: Not on file    Highest education level: Not on file   Occupational History    Not on file   Tobacco Use    Smoking status: Never Smoker    Smokeless tobacco: Never Used   Substance and Sexual Activity    Alcohol use: Yes     Alcohol/week: 0.0 standard drinks     Comment: rare social    Drug use: No    Sexual activity: Not on file     Comment:    Other Topics Concern    Not on file   Social History Narrative    Not on file     Social Determinants of Health     Financial Resource Strain: Low Risk     Difficulty of Paying Living Expenses: Not very hard   Food Insecurity: No Food Insecurity    Worried About Running Out of Food in the Last Year: Never true    Deborah of Food in the Last Year: Never true   Transportation Needs: No Transportation Needs    Lack of Transportation (Medical): No    Lack of Transportation (Non-Medical):  No   Physical Activity:     Days of Exercise per Week: Not on file    Minutes of Exercise per Session: Not on file   Stress:     Feeling of Stress : Not on file   Social Connections:     Frequency of Communication with Friends and Family: Not on file    Frequency of Social Gatherings with Friends and Family: Not on file    Attends [employer] Services: Not on file    Active Member of Clubs or Organizations: Not on file    Attends Club or Organization Meetings: Not on file    Marital Status: Not on file   Intimate Partner Violence:     Fear of Current or Ex-Partner: Not on file    Emotionally Abused: Not on file    Physically Abused: Not on file    Sexually Abused: Not on file   Housing Stability:     Unable to Pay for Housing in the Last Year: Not on file    Number of [place] in the Last Year: Not on file    Unstable Housing in the Last Year: Not on file         Patient's medications,allergies, past medical, surgical, social and family histories were reviewed andupdated as appropriate. Current Outpatient Medications:     ciprofloxacin (CIPRO) 250 MG tablet, Take 0.5 tablets by mouth nightly as needed (recurrent UTI), Disp: 60 tablet, Rfl: 1    ciprofloxacin (CIPRO) 250 MG tablet, Take 1 tablet by mouth 2 times daily for 10 days, THEN 0.5 tablets every evening., Disp: 50 tablet, Rfl: 0    cephALEXin (KEFLEX) 500 MG capsule, Take 1 capsule by mouth 4 times daily, Disp: 40 capsule, Rfl: 0    pravastatin (PRAVACHOL) 40 MG tablet, , Disp: , Rfl:     metoprolol succinate (TOPROL XL) 100 MG extended release tablet, TAKE ONE TABLET BY MOUTH IN THE MORNING, Disp: , Rfl:     potassium chloride (KLOR-CON) 10 MEQ extended release tablet, TAKE ONE TABLET BY MOUTH TWO TIMES A DAY, Disp: , Rfl:     hydroCHLOROthiazide (MICROZIDE) 12.5 MG capsule, TAKE ONE CAPSULE BY MOUTH EVERY DAY IN THE MORNING, Disp: , Rfl:     acetaminophen (APAP EXTRA STRENGTH) 500 MG tablet, Take 2 tablets by mouth every 6 hours as needed for Pain, Disp: 60 tablet, Rfl: 1    Cholecalciferol (VITAMIN D) 2000 units CAPS capsule, Take by mouth Daily with supper, Disp: , Rfl:     aspirin 81 MG tablet, Take by mouth daily , Disp: , Rfl:     Calcium Citrate-Vitamin D (CALCIUM CITRATE + D3 PO), Take by mouth 2 times daily , Disp: , Rfl:     diltiazem (CARDIZEM CD) 240 MG ER capsule, Take 1 capsule by mouth daily (Patient taking differently: Take 240 mg by mouth daily ), Disp: 90 capsule, Rfl: 3    Review of Systems  Review of Systems    All other systems reviewed and are negative.     Physical exam :   General Appearance: alert and oriented to person, placeand time, well-developed and well-nourished, in no acute distress  Pulmonary/Chest:clear to auscultation bilaterally- no wheezes, rales or rhonchi, normal air movement,no respiratory distress  Cardiovascular: normal rate, normal S1 and S2, no gallops,intact distal pulses and no carotid bruits  Abdomen: soft, non-tender  Pelvic:deferred    Assessment:      Diagnosis Orders   1. Recurrent UTI  Urinalysis    Culture, Urine       Was medication effective in resolving symptomsfor patient ? yes -   Plan:     Refills given  Patient follow-up in 4-month  Urine to be sent for analysis and culture    Orders Placed This Encounter   Procedures    Culture, Urine     Standing Status:   Future     Standing Expiration Date:   2/7/2023     Order Specific Question:   Specify (ex-cath, midstream, cysto, etc)? Answer:   midstream    Urinalysis     Standing Status:   Future     Standing Expiration Date:   2/7/2023     Orders Placed This Encounter   Medications    ciprofloxacin (CIPRO) 250 MG tablet     Sig: Take 0.5 tablets by mouth nightly as needed (recurrent UTI)     Dispense:  60 tablet     Refill:  1       Follow up:  Return in about 4 months (around 6/7/2022).         Santos Hackett MD

## 2022-06-07 ENCOUNTER — OFFICE VISIT (OUTPATIENT)
Dept: OBGYN CLINIC | Age: 77
End: 2022-06-07
Payer: MEDICARE

## 2022-06-07 VITALS
WEIGHT: 132 LBS | HEART RATE: 64 BPM | SYSTOLIC BLOOD PRESSURE: 134 MMHG | DIASTOLIC BLOOD PRESSURE: 62 MMHG | BODY MASS INDEX: 25.91 KG/M2 | HEIGHT: 60 IN

## 2022-06-07 DIAGNOSIS — N39.0 RECURRENT UTI: ICD-10-CM

## 2022-06-07 DIAGNOSIS — N39.0 RECURRENT UTI: Primary | ICD-10-CM

## 2022-06-07 LAB
BACTERIA: NEGATIVE /HPF
BILIRUBIN URINE: NEGATIVE
BLOOD, URINE: NEGATIVE
CLARITY: CLEAR
COLOR: YELLOW
EPITHELIAL CELLS, UA: NORMAL /HPF (ref 0–5)
GLUCOSE URINE: NEGATIVE MG/DL
HYALINE CASTS: NORMAL /HPF (ref 0–5)
KETONES, URINE: ABNORMAL MG/DL
LEUKOCYTE ESTERASE, URINE: ABNORMAL
NITRITE, URINE: NEGATIVE
PH UA: 6 (ref 5–9)
PROTEIN UA: NEGATIVE MG/DL
RBC UA: NORMAL /HPF (ref 0–2)
SPECIFIC GRAVITY UA: 1.02 (ref 1–1.03)
UROBILINOGEN, URINE: 0.2 E.U./DL
WBC UA: NORMAL /HPF (ref 0–5)

## 2022-06-07 PROCEDURE — 99213 OFFICE O/P EST LOW 20 MIN: CPT | Performed by: OBSTETRICS & GYNECOLOGY

## 2022-06-07 PROCEDURE — 1123F ACP DISCUSS/DSCN MKR DOCD: CPT | Performed by: OBSTETRICS & GYNECOLOGY

## 2022-06-07 RX ORDER — NITROFURANTOIN MACROCRYSTALS 50 MG/1
50 CAPSULE ORAL 4 TIMES DAILY
Qty: 60 CAPSULE | Refills: 0 | Status: SHIPPED | OUTPATIENT
Start: 2022-06-07 | End: 2022-06-14

## 2022-06-07 NOTE — PROGRESS NOTES
Randall Sarabia is a 64 y.o. female who presents here today for complaints of Follow-up (4 month recurrent UTI.)      . Vitals:  /62 (Site: Right Upper Arm, Position: Sitting, Cuff Size: Medium Adult)   Pulse 64   Ht 5' (1.524 m)   Wt 132 lb (59.9 kg)   LMP 09/27/1996   BMI 25.78 kg/m²   Allergies:  Latex, Bactrim [sulfamethoxazole-trimethoprim], Codeine, and Vicodin [hydrocodone-acetaminophen]  Past Medical History:   Diagnosis Date    Hyperlipidemia     meds > 5 yrs    Hypertension     meds > 5 yrs    Osteoarthritis     Prolapsed uterus      Past Surgical History:   Procedure Laterality Date    BREAST SURGERY Right     fibrocystic breast /Bx / benign    COLONOSCOPY      ENDOSCOPY, COLON, DIAGNOSTIC      EYE SURGERY      phaco with IOL OU.  FRACTURE SURGERY Right     due to fracture / had skeletal hardware    [place] COLONOSCOPY W/BIOPSY SINGLE/MULTIPLE N/A 4/10/2018    COLONOSCOPY WITH BIOPSYS performed by Bridget Dang MD at [facility] >250GMS N/A 10/4/2018    LAPARSCOPIC HYSTERECTOMY performed by Trevor Veloz MD at [address] [facility] N/A 10/4/2018    USLS/ SSLS performed by Trevor Veloz MD at [address] [facility] N/A 10/4/2018    A-P REPAIR WITH TOT OBTRYX and SLING , CYSTOSCOPY performed by Trevor Veloz MD at [facility]     OB History    No obstetric history on file.        Family History   Problem Relation Age of Onset    High Blood Pressure Mother     Other Father         DVTs 1960    Heart Attack Father     Asthma Brother     Cancer Brother         bladder cancer    COPD Brother     High Blood Pressure Son     Other Son         slow IQ    Seizures Son      Social History     Socioeconomic History    Marital status:      Spouse name: Not on file    Number of children: Not on file    Years of education: Not on file    Highest education level: Not on file   Occupational History    Not on file   Tobacco Use    Smoking status: Never Smoker    Smokeless tobacco: Never Used   Substance and Sexual Activity    Alcohol use: Yes     Alcohol/week: 0.0 standard drinks     Comment: rare social    Drug use: No    Sexual activity: Not on file     Comment:    Other Topics Concern    Not on file   Social History Narrative    Not on file     Social Determinants of Health     Financial Resource Strain: Low Risk     Difficulty of Paying Living Expenses: Not very hard   Food Insecurity: No Food Insecurity    Worried About Running Out of Food in the Last Year: Never true    Deborah of Food in the Last Year: Never true   Transportation Needs: No Transportation Needs    Lack of Transportation (Medical): No    Lack of Transportation (Non-Medical): No   Physical Activity:     Days of Exercise per Week: Not on file    Minutes of Exercise per Session: Not on file   Stress:     Feeling of Stress : Not on file   Social Connections:     Frequency of Communication with Friends and Family: Not on file    Frequency of Social Gatherings with Friends and Family: Not on file    Attends [employer] Services: Not on file    Active Member of [address] [place] [address] or Organizations: Not on file    Attends Club or Organization Meetings: Not on file    Marital Status: Not on file   Intimate Partner Violence:     Fear of Current or Ex-Partner: Not on file    Emotionally Abused: Not on file    Physically Abused: Not on file    Sexually Abused: Not on file   Housing Stability:     Unable to Pay for Housing in the Last Year: Not on file    Number of [place] in the Last Year: Not on file    Unstable Housing in the Last Year: Not on file       Contraceptive method:  none    Patient's medications, allergies, past medical, surgical, social and family histories were reviewed and updated as appropriate. Review of Systems  As per chief complaint   All other systems reviewed and are negative.     Physical Exam:  Vitals:  /62 (Site: Right Upper Arm, Position: Sitting, Cuff Size: Medium Adult)   Pulse 64   Ht 5' (1.524 m)   Wt 132 lb (59.9 kg)   LMP 09/27/1996   BMI 25.78 kg/m²   Lungs: CTAB   Heart : Regular S1/S2, no M/R/G  Abdomen: Soft , NT, ND , + BS   Pelvic exam : deferred    Assessment:      Diagnosis Orders   1. Recurrent UTI  Urinalysis    Culture, Urine       Plan:     Send UA and culture   Switch suppression ABX to macrodantin 50 mg po qhs . Return in 3 mnth       Orders Placed This Encounter   Procedures    Culture, Urine     Standing Status:   Future     Number of Occurrences:   1     Standing Expiration Date:   6/7/2023     Order Specific Question:   Specify (ex-cath, midstream, cysto, etc)? Answer:   midstream    Urinalysis     Standing Status:   Future     Number of Occurrences:   1     Standing Expiration Date:   6/7/2023     Orders Placed This Encounter   Medications    nitrofurantoin (MACRODANTIN) 50 MG capsule     Sig: Take 1 capsule by mouth 4 times daily for 7 days     Dispense:  60 capsule     Refill:  0       Follow Up:  Return in about 3 months (around 9/7/2022) for F/U for results, medication assessment.         Jewell Hanks MD

## 2022-06-08 ENCOUNTER — TELEPHONE (OUTPATIENT)
Dept: OBGYN CLINIC | Age: 77
End: 2022-06-08

## 2022-06-08 LAB — URINE CULTURE, ROUTINE: NORMAL

## 2022-06-08 NOTE — TELEPHONE ENCOUNTER
Incoming fax from pharmacy requesting clarification. Prescription for Macrobid was sent over with directions to take 4 times daily for 7 days but with a quantity of 60.

## 2022-06-13 NOTE — TELEPHONE ENCOUNTER
Pt calling on status of prescription. Pharmacy was called and given clarification. Will fill prescription and pt notified.

## 2022-08-08 NOTE — TELEPHONE ENCOUNTER
Pt calling in for refill on macrodantin, Her follow up is scheduled for Sept and she will run put prior to that appt.  Please send rx to [facility]

## 2022-08-09 RX ORDER — NITROFURANTOIN MACROCRYSTALS 50 MG/1
CAPSULE ORAL
Qty: 60 CAPSULE | Refills: 0 | Status: SHIPPED | OUTPATIENT
Start: 2022-08-09 | End: 2022-09-07 | Stop reason: SDUPTHER

## 2022-08-09 RX ORDER — NITROFURANTOIN MACROCRYSTALS 50 MG/1
CAPSULE ORAL
Qty: 30 CAPSULE | Refills: 0 | OUTPATIENT
Start: 2022-08-09

## 2022-09-07 ENCOUNTER — OFFICE VISIT (OUTPATIENT)
Dept: OBGYN CLINIC | Age: 77
End: 2022-09-07
Payer: MEDICARE

## 2022-09-07 VITALS
SYSTOLIC BLOOD PRESSURE: 134 MMHG | BODY MASS INDEX: 26.11 KG/M2 | DIASTOLIC BLOOD PRESSURE: 60 MMHG | WEIGHT: 133 LBS | HEIGHT: 60 IN | HEART RATE: 60 BPM

## 2022-09-07 DIAGNOSIS — N39.0 RECURRENT UTI: Primary | ICD-10-CM

## 2022-09-07 PROCEDURE — 99213 OFFICE O/P EST LOW 20 MIN: CPT | Performed by: OBSTETRICS & GYNECOLOGY

## 2022-09-07 PROCEDURE — 1123F ACP DISCUSS/DSCN MKR DOCD: CPT | Performed by: OBSTETRICS & GYNECOLOGY

## 2022-09-07 RX ORDER — NITROFURANTOIN MACROCRYSTALS 50 MG/1
50 CAPSULE ORAL NIGHTLY
Qty: 60 CAPSULE | Refills: 0 | Status: SHIPPED | OUTPATIENT
Start: 2022-09-07

## 2022-09-07 NOTE — PROGRESS NOTES
Not on file   Tobacco Use    Smoking status: Never    Smokeless tobacco: Never   Substance and Sexual Activity    Alcohol use: Yes     Alcohol/week: 0.0 standard drinks     Comment: rare social    Drug use: No    Sexual activity: Not on file     Comment:    Other Topics Concern    Not on file   Social History Narrative    Not on file     Social Determinants of Health     Financial Resource Strain: Not on file   Food Insecurity: Not on file   Transportation Needs: Not on file   Physical Activity: Not on file   Stress: Not on file   Social Connections: Not on file   Intimate Partner Violence: Not on file   Housing Stability: Not on file       Contraceptive method:  none    Patient's medications, allergies, past medical, surgical, social and family histories were reviewed and updated as appropriate. Review of Systems  As per chief complaint   All other systems reviewed and are negative. Physical Exam:  Vitals:  /60   Pulse 60   Ht 5' (1.524 m)   Wt 133 lb (60.3 kg)   LMP 09/27/1996   BMI 25.97 kg/m²   Lungs: CTAB   Heart : Regular S1/S2, no M/R/G  Abdomen: Soft , NT, ND , + BS   Pelvic exam : deferred    Assessment:      Diagnosis Orders   1. Recurrent UTI  Urinalysis    Culture, Urine          Plan:     Hx recurrent UTI   Continue macrodantin 50 mg QHS DAILY FOR SUPPRESSION THERAPY   Return in 3 mnth . Send UA and cx       Orders Placed This Encounter   Procedures    Culture, Urine     Standing Status:   Future     Standing Expiration Date:   9/7/2023     Order Specific Question:   Specify (ex-cath, midstream, cysto, etc)?      Answer:   midstream    Urinalysis     Standing Status:   Future     Standing Expiration Date:   9/7/2023     Orders Placed This Encounter   Medications    nitrofurantoin (MACRODANTIN) 50 MG capsule     Sig: Take 1 capsule by mouth nightly     Dispense:  60 capsule     Refill:  0       Follow Up:  Return in about 3 months (around 12/7/2022) for medication assessment.         Dora Ventura MD

## 2022-12-07 ENCOUNTER — OFFICE VISIT (OUTPATIENT)
Dept: OBGYN CLINIC | Age: 77
End: 2022-12-07
Payer: MEDICARE

## 2022-12-07 VITALS
SYSTOLIC BLOOD PRESSURE: 132 MMHG | BODY MASS INDEX: 25.91 KG/M2 | DIASTOLIC BLOOD PRESSURE: 64 MMHG | HEART RATE: 64 BPM | HEIGHT: 60 IN | WEIGHT: 132 LBS

## 2022-12-07 DIAGNOSIS — N39.0 RECURRENT UTI: Primary | ICD-10-CM

## 2022-12-07 DIAGNOSIS — N39.0 RECURRENT UTI: ICD-10-CM

## 2022-12-07 LAB
BILIRUBIN URINE: NEGATIVE
BLOOD, URINE: NEGATIVE
CLARITY: CLEAR
COLOR: YELLOW
GLUCOSE URINE: NEGATIVE MG/DL
KETONES, URINE: NEGATIVE MG/DL
LEUKOCYTE ESTERASE, URINE: NEGATIVE
NITRITE, URINE: NEGATIVE
PH UA: 7 (ref 5–9)
PROTEIN UA: NEGATIVE MG/DL
SPECIFIC GRAVITY UA: 1.02 (ref 1–1.03)
UROBILINOGEN, URINE: 1 E.U./DL

## 2022-12-07 PROCEDURE — 1123F ACP DISCUSS/DSCN MKR DOCD: CPT | Performed by: OBSTETRICS & GYNECOLOGY

## 2022-12-07 PROCEDURE — 99213 OFFICE O/P EST LOW 20 MIN: CPT | Performed by: OBSTETRICS & GYNECOLOGY

## 2022-12-07 RX ORDER — CEPHALEXIN 250 MG/1
250 CAPSULE ORAL EVERY EVENING
Qty: 90 CAPSULE | Refills: 0 | Status: SHIPPED | OUTPATIENT
Start: 2022-12-07

## 2022-12-07 NOTE — PROGRESS NOTES
Medication FollowUp     Charlotte Fry is a 76y.o. year old female here todiscuss symptoms after use of medications prescribed last visit. Symptoms  hx recurrent UTI  . Medication/s prescribed macrodantin 50 mg QHS  . Side effects reported : none. Mentions symptomsimproved : yes - . Vitals:  /64 (Site: Left Upper Arm, Position: Sitting, Cuff Size: Medium Adult)   Pulse 64   Ht 5' (1.524 m)   Wt 132 lb (59.9 kg)   LMP 09/27/1996   BMI 25.78 kg/m²   Allergies:  Latex, Bactrim [sulfamethoxazole-trimethoprim], Codeine, and Vicodin [hydrocodone-acetaminophen]  Past Medical History:   Diagnosis Date    Hyperlipidemia     meds > 5 yrs    Hypertension     meds > 5 yrs    Osteoarthritis     Prolapsed uterus         Past Surgical History:   Procedure Laterality Date    BREAST SURGERY Right     fibrocystic breast /Bx / benign    COLONOSCOPY      ENDOSCOPY, COLON, DIAGNOSTIC      EYE SURGERY      phaco with IOL OU. FRACTURE SURGERY Right     due to fracture / had skeletal hardware    [place] COLONOSCOPY W/BIOPSY SINGLE/MULTIPLE N/A 4/10/2018    COLONOSCOPY WITH BIOPSYS performed by Wesly Bonds MD at [address] [place] [address] >250GMS N/A 10/4/2018    LAPARSCOPIC HYSTERECTOMY performed by Ewelina Trevino MD at [address] N/A 10/4/2018    USLS/ SSLS performed by Ewelina Trevino MD at [address] [facility] N/A 10/4/2018    A-P REPAIR WITH TOT OBTRYX and SLING , CYSTOSCOPY performed by Ewelina Trevino MD at [facility]     OB History    No obstetric history on file.        Family History   Problem Relation Age of Onset    High Blood Pressure Mother     Other Father         DVTs 1960    Heart Attack Father     Asthma Brother     Cancer Brother         bladder cancer    COPD Brother     High Blood Pressure Son     Other Son         slow IQ    Seizures Son      Social History     Socioeconomic History    Marital status:      Spouse name: Not on file    Number of children: Not on file Years of education: Not on file    Highest education level: Not on file   Occupational History    Not on file   Tobacco Use    Smoking status: Never    Smokeless tobacco: Never   Substance and Sexual Activity    Alcohol use: Yes     Alcohol/week: 0.0 standard drinks     Comment: rare social    Drug use: No    Sexual activity: Not on file     Comment:    Other Topics Concern    Not on file   Social History Narrative    Not on file     Social Determinants of Health     Financial Resource Strain: Not on file   Food Insecurity: Not on file   Transportation Needs: Not on file   Physical Activity: Not on file   Stress: Not on file   Social Connections: Not on file   Intimate Partner Violence: Not on file   Housing Stability: Not on file         Patient's medications,allergies, past medical, surgical, social and family histories were reviewed andupdated as appropriate.       Current Outpatient Medications:     cephALEXin (KEFLEX) 250 MG capsule, Take 1 capsule by mouth every evening, Disp: 90 capsule, Rfl: 0    nitrofurantoin (MACRODANTIN) 50 MG capsule, Take 1 capsule by mouth nightly, Disp: 60 capsule, Rfl: 0    pravastatin (PRAVACHOL) 40 MG tablet, , Disp: , Rfl:     metoprolol succinate (TOPROL XL) 100 MG extended release tablet, TAKE ONE TABLET BY MOUTH IN THE MORNING, Disp: , Rfl:     potassium chloride (KLOR-CON) 10 MEQ extended release tablet, TAKE ONE TABLET BY MOUTH TWO TIMES A DAY, Disp: , Rfl:     hydroCHLOROthiazide (MICROZIDE) 12.5 MG capsule, TAKE ONE CAPSULE BY MOUTH EVERY DAY IN THE MORNING, Disp: , Rfl:     acetaminophen (APAP EXTRA STRENGTH) 500 MG tablet, Take 2 tablets by mouth every 6 hours as needed for Pain (Patient not taking: Reported on 9/7/2022), Disp: 60 tablet, Rfl: 1    Cholecalciferol (VITAMIN D) 2000 units CAPS capsule, Take by mouth Daily with supper, Disp: , Rfl:     aspirin 81 MG tablet, Take by mouth daily , Disp: , Rfl:     Calcium Citrate-Vitamin D (CALCIUM CITRATE + D3 PO), Take by mouth 2 times daily , Disp: , Rfl:     diltiazem (CARDIZEM CD) 240 MG ER capsule, Take 1 capsule by mouth daily (Patient taking differently: Take 240 mg by mouth daily Indications: at bedtime), Disp: 90 capsule, Rfl: 3    Review of Systems  Review of Systems    All other systems reviewed and are negative. Physical exam :   General Appearance: alert and oriented to person, placeand time, well-developed and well-nourished, in no acute distress  Pulmonary/Chest:clear to auscultation bilaterally- no wheezes, rales or rhonchi, normal air movement,no respiratory distress  Cardiovascular: normal rate, normal S1 and S2, no gallops,intact distal pulses and no carotid bruits  Abdomen: soft, non-tender  Pelvic:deferred    Assessment:      Diagnosis Orders   1. Recurrent UTI  Urinalysis    Culture, Urine          Was medication effective in resolving symptomsfor patient ? yes -   Plan:     Successful suppression treatment for recurrent UTI  Will switch to prevent resistence advised regular change every 3-4 mnth . Keflex 250 mg QHS for the next 3-4 mnth   Patient can call and ask to change abx for next visit since doing well on both antibiotics with good symptoms control. Return as needed. Counseled about UTI symptoms. Orders Placed This Encounter   Procedures    Culture, Urine     Standing Status:   Future     Number of Occurrences:   1     Standing Expiration Date:   12/7/2023     Order Specific Question:   Specify (ex-cath, midstream, cysto, etc)? Answer:   midstream    Urinalysis     Standing Status:   Future     Number of Occurrences:   1     Standing Expiration Date:   12/7/2023     Orders Placed This Encounter   Medications    cephALEXin (KEFLEX) 250 MG capsule     Sig: Take 1 capsule by mouth every evening     Dispense:  90 capsule     Refill:  0       Follow up:  No follow-ups on file.         Francisco Stallworth MD

## 2022-12-08 LAB — URINE CULTURE, ROUTINE: NORMAL

## 2023-01-23 DIAGNOSIS — N39.0 RECURRENT UTI: Primary | ICD-10-CM

## 2023-01-23 DIAGNOSIS — N39.0 RECURRENT UTI: ICD-10-CM

## 2023-01-23 LAB
BACTERIA: ABNORMAL /HPF
BILIRUBIN URINE: NEGATIVE
BLOOD, URINE: ABNORMAL
CLARITY: ABNORMAL
COLOR: YELLOW
EPITHELIAL CELLS, UA: ABNORMAL /HPF (ref 0–5)
GLUCOSE URINE: NEGATIVE MG/DL
HYALINE CASTS: ABNORMAL /HPF (ref 0–5)
KETONES, URINE: ABNORMAL MG/DL
LEUKOCYTE ESTERASE, URINE: ABNORMAL
NITRITE, URINE: POSITIVE
PH UA: 7.5 (ref 5–9)
PROTEIN UA: ABNORMAL MG/DL
RBC UA: ABNORMAL /HPF (ref 0–5)
SPECIFIC GRAVITY UA: 1.02 (ref 1–1.03)
UROBILINOGEN, URINE: 0.2 E.U./DL
WBC UA: >100 /HPF (ref 0–5)

## 2023-01-24 RX ORDER — NITROFURANTOIN 25; 75 MG/1; MG/1
100 CAPSULE ORAL 2 TIMES DAILY
Qty: 20 CAPSULE | Refills: 0 | Status: SHIPPED | OUTPATIENT
Start: 2023-01-24 | End: 2023-02-03

## 2023-01-26 LAB
ORGANISM: ABNORMAL
URINE CULTURE, ROUTINE: ABNORMAL
URINE CULTURE, ROUTINE: ABNORMAL

## 2023-02-01 ENCOUNTER — NURSE ONLY (OUTPATIENT)
Dept: OBGYN CLINIC | Age: 78
End: 2023-02-01

## 2023-02-01 DIAGNOSIS — N39.0 RECURRENT UTI: Primary | ICD-10-CM

## 2023-02-01 DIAGNOSIS — N39.0 RECURRENT UTI: ICD-10-CM

## 2023-02-01 LAB
BACTERIA: NEGATIVE /HPF
BILIRUBIN URINE: NEGATIVE
BLOOD, URINE: NEGATIVE
CLARITY: CLEAR
COLOR: ABNORMAL
EPITHELIAL CELLS, UA: ABNORMAL /HPF (ref 0–5)
GLUCOSE URINE: NEGATIVE MG/DL
HYALINE CASTS: ABNORMAL /HPF (ref 0–5)
KETONES, URINE: NEGATIVE MG/DL
LEUKOCYTE ESTERASE, URINE: ABNORMAL
NITRITE, URINE: NEGATIVE
PH UA: 6 (ref 5–9)
PROTEIN UA: NEGATIVE MG/DL
RBC UA: ABNORMAL /HPF (ref 0–5)
SPECIFIC GRAVITY UA: 1.02 (ref 1–1.03)
UROBILINOGEN, URINE: 0.2 E.U./DL
WBC UA: ABNORMAL /HPF (ref 0–5)

## 2023-02-02 LAB — URINE CULTURE, ROUTINE: NORMAL

## 2023-02-27 DIAGNOSIS — N39.0 RECURRENT UTI: Primary | ICD-10-CM

## 2023-02-27 DIAGNOSIS — N39.0 RECURRENT UTI: ICD-10-CM

## 2023-02-27 LAB
BACTERIA: ABNORMAL /HPF
BILIRUBIN URINE: NEGATIVE
BLOOD, URINE: ABNORMAL
CLARITY: ABNORMAL
COLOR: YELLOW
EPITHELIAL CELLS, UA: ABNORMAL /HPF (ref 0–5)
GLUCOSE URINE: NEGATIVE MG/DL
HYALINE CASTS: ABNORMAL /HPF (ref 0–5)
HYALINE CASTS: ABNORMAL /LPF (ref 0–5)
KETONES, URINE: ABNORMAL MG/DL
LEUKOCYTE ESTERASE, URINE: ABNORMAL
NITRITE, URINE: POSITIVE
PH UA: 7 (ref 5–9)
PROTEIN UA: 30 MG/DL
RBC UA: ABNORMAL /HPF (ref 0–5)
RENAL EPITHELIAL, UA: ABNORMAL /HPF
SPECIFIC GRAVITY UA: 1.02 (ref 1–1.03)
UROBILINOGEN, URINE: 0.2 E.U./DL
WBC UA: >100 /HPF (ref 0–5)

## 2023-02-27 RX ORDER — NITROFURANTOIN 25; 75 MG/1; MG/1
100 CAPSULE ORAL 2 TIMES DAILY
Qty: 20 CAPSULE | Refills: 0 | Status: SHIPPED | OUTPATIENT
Start: 2023-02-27 | End: 2023-03-09

## 2023-03-01 LAB
ORGANISM: ABNORMAL
URINE CULTURE, ROUTINE: ABNORMAL
URINE CULTURE, ROUTINE: ABNORMAL

## 2023-03-15 ENCOUNTER — OFFICE VISIT (OUTPATIENT)
Dept: OBGYN CLINIC | Age: 78
End: 2023-03-15
Payer: MEDICARE

## 2023-03-15 VITALS
HEART RATE: 76 BPM | SYSTOLIC BLOOD PRESSURE: 118 MMHG | BODY MASS INDEX: 25.91 KG/M2 | DIASTOLIC BLOOD PRESSURE: 60 MMHG | HEIGHT: 60 IN | WEIGHT: 132 LBS

## 2023-03-15 DIAGNOSIS — N39.0 RECURRENT UTI: Primary | ICD-10-CM

## 2023-03-15 DIAGNOSIS — N39.0 RECURRENT UTI: ICD-10-CM

## 2023-03-15 LAB
BILIRUB UR QL STRIP: NEGATIVE
CLARITY UR: CLEAR
COLOR UR: YELLOW
GLUCOSE UR STRIP-MCNC: NEGATIVE MG/DL
HGB UR QL STRIP: NEGATIVE
KETONES UR STRIP-MCNC: NEGATIVE MG/DL
LEUKOCYTE ESTERASE UR QL STRIP: ABNORMAL
NITRITE UR QL STRIP: NEGATIVE
PH UR STRIP: 6.5 [PH] (ref 5–9)
PROT UR STRIP-MCNC: NEGATIVE MG/DL
SP GR UR STRIP: 1.02 (ref 1–1.03)
UROBILINOGEN UR STRIP-ACNC: 0.2 E.U./DL

## 2023-03-15 PROCEDURE — 99213 OFFICE O/P EST LOW 20 MIN: CPT | Performed by: OBSTETRICS & GYNECOLOGY

## 2023-03-15 PROCEDURE — 1123F ACP DISCUSS/DSCN MKR DOCD: CPT | Performed by: OBSTETRICS & GYNECOLOGY

## 2023-03-15 RX ORDER — METHENAMINE HIPPURATE 1000 MG/1
1 TABLET ORAL 2 TIMES DAILY WITH MEALS
Qty: 60 TABLET | Refills: 2 | Status: SHIPPED | OUTPATIENT
Start: 2023-03-15

## 2023-03-15 RX ORDER — NITROFURANTOIN MACROCRYSTALS 50 MG/1
50 CAPSULE ORAL NIGHTLY
Qty: 60 CAPSULE | Refills: 1 | Status: SHIPPED | OUTPATIENT
Start: 2023-03-15

## 2023-03-15 NOTE — PROGRESS NOTES
Vianca Park is a 76 y.o. female who presents here today for complaints of Follow-up (Recurrent UTI.)  History of recurrent UTI that has significantly improved with daily low-dose antibiotic suppression treatment alternating between Keflex 250 mg nightly and Macrodantin 50 mg p.o. nightly. Over the past 3 months, the patient did experience 2 urinary tract infections when she has more than the usual frequency of the suppression medications, for which the patient was treated and is here for follow-up today. .      Vitals:  /60 (Site: Left Upper Arm, Position: Sitting, Cuff Size: Medium Adult)   Pulse 76   Ht 5' (1.524 m)   Wt 132 lb (59.9 kg)   LMP 09/27/1996   BMI 25.78 kg/m²   Allergies:  Latex, Bactrim [sulfamethoxazole-trimethoprim], Codeine, and Vicodin [hydrocodone-acetaminophen]  Past Medical History:   Diagnosis Date    Hyperlipidemia     meds > 5 yrs    Hypertension     meds > 5 yrs    Osteoarthritis     Prolapsed uterus      Past Surgical History:   Procedure Laterality Date    BREAST SURGERY Right     fibrocystic breast /Bx / benign    COLONOSCOPY      ENDOSCOPY, COLON, DIAGNOSTIC      EYE SURGERY      phaco with IOL OU. FRACTURE SURGERY Right     due to fracture / had skeletal hardware    [place] COLONOSCOPY W/BIOPSY SINGLE/MULTIPLE N/A 4/10/2018    COLONOSCOPY WITH BIOPSYS performed by Marian Cox MD at [address] [place] URETER N/A 10/4/2018    USLS/ SSLS performed by Braxton Wolff MD at [address] > 250 GRAMS N/A 10/4/2018    LAPARSCOPIC HYSTERECTOMY performed by Braxton Wolff MD at [address] N/A 10/4/2018    A-P REPAIR WITH TOT OBTRYX and SLING , CYSTOSCOPY performed by Braxton Wolff MD at [facility]     OB History    No obstetric history on file.        Family History   Problem Relation Age of Onset    High Blood Pressure Mother     Other Father         DVTs 1960    Heart Attack Father     Asthma Brother     Cancer Brother    bladder cancer    COPD Brother     High Blood Pressure Son     Other Son         slow IQ    Seizures Son      Social History     Socioeconomic History    Marital status:      Spouse name: Not on file    Number of children: Not on file    Years of education: Not on file    Highest education level: Not on file   Occupational History    Not on file   Tobacco Use    Smoking status: Never    Smokeless tobacco: Never   Substance and Sexual Activity    Alcohol use: Yes     Alcohol/week: 0.0 standard drinks     Comment: rare social    Drug use: No    Sexual activity: Not on file     Comment:    Other Topics Concern    Not on file   Social History Narrative    Not on file     Social Determinants of Health     Financial Resource Strain: Not on file   Food Insecurity: Not on file   Transportation Needs: Not on file   Physical Activity: Not on file   Stress: Not on file   Social Connections: Not on file   Intimate Partner Violence: Not on file   Housing Stability: Not on file       Contraceptive method:  none    Patient's medications, allergies, past medical, surgical, social and family histories were reviewed and updated as appropriate.    Review of Systems  As per chief complaint   All other systems reviewed and are negative.    Physical Exam:  Vitals:  /60 (Site: Left Upper Arm, Position: Sitting, Cuff Size: Medium Adult)   Pulse 76   Ht 5' (1.524 m)   Wt 132 lb (59.9 kg)   LMP 09/27/1996   BMI 25.78 kg/m²   Lungs: CTAB   Heart : Regular S1/S2, no M/R/G  Abdomen: Soft , NT, ND , + BS   Pelvic exam : deferred    Assessment:      Diagnosis Orders   1. Recurrent UTI  Urinalysis    Culture, Urine          Plan:     Patient was started on methenamine 1 g p.o. once daily  Switch from Keflex to Macrodantin 50 mg p.o. nightly for the next 3 months  Urine sent for analysis and culture  Patient counseled about signs and symptoms of urinary tract infection plan was formulated for the patient to call and ask  for treatment whenever she feels symptoms of an acute UTI I are starting despite using the suppression treatment.   Patient follow-up in 2 weeks      Orders Placed This Encounter   Procedures    Culture, Urine     Standing Status:   Future     Standing Expiration Date:   3/15/2024     Order Specific Question:   Specify (ex-cath, midstream, cysto, etc)?     Answer:   midstream    Urinalysis     Standing Status:   Future     Standing Expiration Date:   3/14/2024     Orders Placed This Encounter   Medications    nitrofurantoin (MACRODANTIN) 50 MG capsule     Sig: Take 1 capsule by mouth nightly     Dispense:  60 capsule     Refill:  1    methenamine (HIPREX) 1 g tablet     Sig: Take 1 tablet by mouth 2 times daily (with meals)     Dispense:  60 tablet     Refill:  2       Follow Up:  Return in about 2 weeks (around 3/29/2023) for medication assessment.        Senia Antonio MD

## 2023-03-16 LAB
BACTERIA UR CULT: NORMAL
BACTERIA URNS QL MICRO: ABNORMAL /HPF
EPI CELLS #/AREA URNS AUTO: ABNORMAL /HPF (ref 0–5)
HYALINE CASTS #/AREA URNS AUTO: ABNORMAL /HPF (ref 0–5)
RBC #/AREA URNS AUTO: ABNORMAL /HPF (ref 0–5)
WBC #/AREA URNS AUTO: ABNORMAL /HPF (ref 0–5)

## 2023-03-29 ENCOUNTER — OFFICE VISIT (OUTPATIENT)
Dept: OBGYN CLINIC | Age: 78
End: 2023-03-29
Payer: MEDICARE

## 2023-03-29 VITALS
HEIGHT: 60 IN | SYSTOLIC BLOOD PRESSURE: 138 MMHG | WEIGHT: 132 LBS | DIASTOLIC BLOOD PRESSURE: 58 MMHG | BODY MASS INDEX: 25.91 KG/M2 | HEART RATE: 64 BPM

## 2023-03-29 DIAGNOSIS — N39.0 RECURRENT UTI: Primary | ICD-10-CM

## 2023-03-29 PROCEDURE — 1123F ACP DISCUSS/DSCN MKR DOCD: CPT | Performed by: OBSTETRICS & GYNECOLOGY

## 2023-03-29 PROCEDURE — 99213 OFFICE O/P EST LOW 20 MIN: CPT | Performed by: OBSTETRICS & GYNECOLOGY

## 2023-03-29 NOTE — PROGRESS NOTES
Arpit Constantino is a 76 y.o. female who presents here today for complaints of Follow-up (Recurrent UTI. Methenamine, Macrodantin. )    Recent UTI resolved. .      Vitals:  BP (!) 138/58 (Site: Left Upper Arm, Position: Sitting, Cuff Size: Medium Adult)   Pulse 64   Ht 5' (1.524 m)   Wt 132 lb (59.9 kg)   LMP 09/27/1996   BMI 25.78 kg/m²   Allergies:  Latex, Bactrim [sulfamethoxazole-trimethoprim], Codeine, and Vicodin [hydrocodone-acetaminophen]  Past Medical History:   Diagnosis Date    Hyperlipidemia     meds > 5 yrs    Hypertension     meds > 5 yrs    Osteoarthritis     Prolapsed uterus      Past Surgical History:   Procedure Laterality Date    BREAST SURGERY Right     fibrocystic breast /Bx / benign    COLONOSCOPY      ENDOSCOPY, COLON, DIAGNOSTIC      EYE SURGERY      phaco with IOL OU. FRACTURE SURGERY Right     due to fracture / had skeletal hardware    [place] COLONOSCOPY W/BIOPSY SINGLE/MULTIPLE N/A 4/10/2018    COLONOSCOPY WITH BIOPSYS [facility] [address]  [facility] N/A 10/4/2018    USLS/ SSLS performed by Usama June MD at [facility] > 250 GRAMS N/A 10/4/2018    LAPARSCOPIC HYSTERECTOMY performed by Usama June MD at [address] N/A 10/4/2018    A-P REPAIR WITH TOT OBTRYX and SLING , CYSTOSCOPY performed by Usama June MD at [facility]     OB History    No obstetric history on file.        Family History   Problem Relation Age of Onset    High Blood Pressure Mother     Other Father         DVTs 1960    Heart Attack Father     Asthma Brother     Cancer Brother         bladder cancer    COPD Brother     High Blood Pressure Son     Other Son         slow IQ    Seizures Son      Social History     Socioeconomic History    Marital status:      Spouse name: Not on file    Number of children: Not on file    Years of education: Not on file    Highest education level: Not on file   Occupational History    Not on

## 2023-06-14 DIAGNOSIS — R30.0 DYSURIA: ICD-10-CM

## 2023-06-14 DIAGNOSIS — N39.0 RECURRENT UTI: ICD-10-CM

## 2023-06-14 LAB
BACTERIA URNS QL MICRO: ABNORMAL /HPF
BILIRUB UR QL STRIP: NEGATIVE
CLARITY UR: CLEAR
COLOR UR: YELLOW
CRYSTALS URNS MICRO: ABNORMAL /HPF
EPI CELLS #/AREA URNS AUTO: ABNORMAL /HPF (ref 0–5)
GLUCOSE UR STRIP-MCNC: NEGATIVE MG/DL
HGB UR QL STRIP: NEGATIVE
HYALINE CASTS #/AREA URNS AUTO: ABNORMAL /HPF (ref 0–5)
KETONES UR STRIP-MCNC: NEGATIVE MG/DL
LEUKOCYTE ESTERASE UR QL STRIP: ABNORMAL
NITRITE UR QL STRIP: NEGATIVE
PH UR STRIP: >=9 [PH] (ref 5–9)
PROT UR STRIP-MCNC: 30 MG/DL
RBC #/AREA URNS AUTO: ABNORMAL /HPF (ref 0–5)
SP GR UR STRIP: 1.02 (ref 1–1.03)
UROBILINOGEN UR STRIP-ACNC: 0.2 E.U./DL
WBC #/AREA URNS AUTO: ABNORMAL /HPF (ref 0–5)

## 2023-06-18 RX ORDER — LEVOFLOXACIN 500 MG/1
500 TABLET, FILM COATED ORAL DAILY
Qty: 10 TABLET | Refills: 0 | Status: SHIPPED | OUTPATIENT
Start: 2023-06-18 | End: 2023-06-26 | Stop reason: SINTOL

## 2023-06-19 ENCOUNTER — TELEPHONE (OUTPATIENT)
Dept: OBGYN CLINIC | Age: 78
End: 2023-06-19

## 2023-06-19 LAB
BACTERIA UR CULT: ABNORMAL
BACTERIA UR CULT: ABNORMAL
ORGANISM: ABNORMAL

## 2023-06-19 RX ORDER — LEVOFLOXACIN 500 MG/1
500 TABLET, FILM COATED ORAL DAILY
Qty: 7 TABLET | Refills: 0 | Status: SHIPPED | OUTPATIENT
Start: 2023-06-19 | End: 2023-06-26 | Stop reason: SINTOL

## 2023-06-19 NOTE — TELEPHONE ENCOUNTER
Pt calling because she saw her culture results on mychart and wanted to know if anything was sent. Pt informed that medication was sent to pharmacy last night for UTI.

## 2023-06-20 ENCOUNTER — TELEPHONE (OUTPATIENT)
Dept: OBGYN CLINIC | Age: 78
End: 2023-06-20

## 2023-06-20 NOTE — TELEPHONE ENCOUNTER
Patient states after taking the Levofloxacin that was prescribed to her for her UTI , she said that she has been having nausea and frequent BM and she said she has no appetite and just dose not feel well after talking the medication. She states that she has only taken 1 day of the medication and has stopped taking the medication , patient would like to know if she can get another medication prescribed.

## 2023-06-21 RX ORDER — DIPHENHYDRAMINE HYDROCHLORIDE 50 MG/ML
50 INJECTION INTRAMUSCULAR; INTRAVENOUS
OUTPATIENT
Start: 2023-06-22

## 2023-06-21 RX ORDER — FAMOTIDINE 10 MG/ML
20 INJECTION, SOLUTION INTRAVENOUS
OUTPATIENT
Start: 2023-06-22

## 2023-06-21 RX ORDER — ACETAMINOPHEN 325 MG/1
650 TABLET ORAL
OUTPATIENT
Start: 2023-06-22

## 2023-06-21 RX ORDER — SODIUM CHLORIDE 9 MG/ML
5-250 INJECTION, SOLUTION INTRAVENOUS PRN
OUTPATIENT
Start: 2023-06-22

## 2023-06-21 RX ORDER — HEPARIN SODIUM (PORCINE) LOCK FLUSH IV SOLN 100 UNIT/ML 100 UNIT/ML
500 SOLUTION INTRAVENOUS PRN
OUTPATIENT
Start: 2023-06-22

## 2023-06-21 RX ORDER — ONDANSETRON 2 MG/ML
8 INJECTION INTRAMUSCULAR; INTRAVENOUS
OUTPATIENT
Start: 2023-06-22

## 2023-06-21 RX ORDER — EPINEPHRINE 1 MG/ML
0.3 INJECTION, SOLUTION, CONCENTRATE INTRAVENOUS PRN
OUTPATIENT
Start: 2023-06-22

## 2023-06-21 RX ORDER — SODIUM CHLORIDE 9 MG/ML
INJECTION, SOLUTION INTRAVENOUS CONTINUOUS
OUTPATIENT
Start: 2023-06-22

## 2023-06-21 RX ORDER — SODIUM CHLORIDE 0.9 % (FLUSH) 0.9 %
5-40 SYRINGE (ML) INJECTION PRN
OUTPATIENT
Start: 2023-06-22

## 2023-06-21 RX ORDER — ALBUTEROL SULFATE 90 UG/1
4 AEROSOL, METERED RESPIRATORY (INHALATION) PRN
OUTPATIENT
Start: 2023-06-22

## 2023-06-21 NOTE — TELEPHONE ENCOUNTER
We need to schedule ceftriaxone daily injections in the infusion center , 1 gm ceftriaxone q 24 hr for 7 days to treat , no other oral antibiotic options are available based on her last culture . Patient needs a follow up appointment to remove whiting .

## 2023-06-26 ENCOUNTER — HOSPITAL ENCOUNTER (OUTPATIENT)
Dept: INFUSION THERAPY | Age: 78
Setting detail: INFUSION SERIES
Discharge: HOME | End: 2023-06-26
Payer: MEDICARE

## 2023-06-26 VITALS
TEMPERATURE: 98.2 F | RESPIRATION RATE: 18 BRPM | DIASTOLIC BLOOD PRESSURE: 74 MMHG | HEART RATE: 52 BPM | SYSTOLIC BLOOD PRESSURE: 164 MMHG

## 2023-06-26 DIAGNOSIS — N39.0 RECURRENT UTI: Primary | ICD-10-CM

## 2023-06-26 PROCEDURE — 6360000002 HC RX W HCPCS: Performed by: OBSTETRICS & GYNECOLOGY

## 2023-06-26 PROCEDURE — 96365 THER/PROPH/DIAG IV INF INIT: CPT

## 2023-06-26 PROCEDURE — 2580000003 HC RX 258: Performed by: OBSTETRICS & GYNECOLOGY

## 2023-06-26 RX ORDER — ONDANSETRON 2 MG/ML
8 INJECTION INTRAMUSCULAR; INTRAVENOUS
Status: CANCELLED | OUTPATIENT
Start: 2023-06-27

## 2023-06-26 RX ORDER — SODIUM CHLORIDE 0.9 % (FLUSH) 0.9 %
5-40 SYRINGE (ML) INJECTION PRN
Status: CANCELLED | OUTPATIENT
Start: 2023-06-27

## 2023-06-26 RX ORDER — FAMOTIDINE 10 MG/ML
20 INJECTION, SOLUTION INTRAVENOUS
Status: CANCELLED | OUTPATIENT
Start: 2023-06-27

## 2023-06-26 RX ORDER — HEPARIN SODIUM 100 [USP'U]/ML
500 INJECTION, SOLUTION INTRAVENOUS PRN
Status: CANCELLED | OUTPATIENT
Start: 2023-06-27

## 2023-06-26 RX ORDER — SODIUM CHLORIDE 9 MG/ML
5-250 INJECTION, SOLUTION INTRAVENOUS PRN
Status: CANCELLED | OUTPATIENT
Start: 2023-06-27

## 2023-06-26 RX ORDER — EPINEPHRINE 1 MG/ML
0.3 INJECTION, SOLUTION, CONCENTRATE INTRAVENOUS PRN
Status: CANCELLED | OUTPATIENT
Start: 2023-06-27

## 2023-06-26 RX ORDER — ACETAMINOPHEN 325 MG/1
650 TABLET ORAL
Status: CANCELLED | OUTPATIENT
Start: 2023-06-27

## 2023-06-26 RX ORDER — ALBUTEROL SULFATE 90 UG/1
4 AEROSOL, METERED RESPIRATORY (INHALATION) PRN
Status: CANCELLED | OUTPATIENT
Start: 2023-06-27

## 2023-06-26 RX ORDER — LANOLIN ALCOHOL/MO/W.PET/CERES
1000 CREAM (GRAM) TOPICAL DAILY
COMMUNITY

## 2023-06-26 RX ORDER — DIPHENHYDRAMINE HYDROCHLORIDE 50 MG/ML
50 INJECTION INTRAMUSCULAR; INTRAVENOUS
Status: CANCELLED | OUTPATIENT
Start: 2023-06-27

## 2023-06-26 RX ORDER — SODIUM CHLORIDE 9 MG/ML
INJECTION, SOLUTION INTRAVENOUS CONTINUOUS
Status: CANCELLED | OUTPATIENT
Start: 2023-06-27

## 2023-06-26 RX ADMIN — CEFTRIAXONE SODIUM 1000 MG: 1 INJECTION, POWDER, FOR SOLUTION INTRAMUSCULAR; INTRAVENOUS at 11:50

## 2023-06-27 ENCOUNTER — HOSPITAL ENCOUNTER (OUTPATIENT)
Dept: INFUSION THERAPY | Age: 78
Setting detail: INFUSION SERIES
Discharge: HOME | End: 2023-06-27
Payer: MEDICARE

## 2023-06-27 VITALS
TEMPERATURE: 97.7 F | DIASTOLIC BLOOD PRESSURE: 66 MMHG | HEART RATE: 50 BPM | SYSTOLIC BLOOD PRESSURE: 150 MMHG | RESPIRATION RATE: 18 BRPM

## 2023-06-27 DIAGNOSIS — N39.0 RECURRENT UTI: Primary | ICD-10-CM

## 2023-06-27 PROCEDURE — 6360000002 HC RX W HCPCS: Performed by: OBSTETRICS & GYNECOLOGY

## 2023-06-27 PROCEDURE — 96365 THER/PROPH/DIAG IV INF INIT: CPT

## 2023-06-27 PROCEDURE — 2580000003 HC RX 258: Performed by: OBSTETRICS & GYNECOLOGY

## 2023-06-27 RX ORDER — ACETAMINOPHEN 325 MG/1
650 TABLET ORAL
Status: CANCELLED | OUTPATIENT
Start: 2023-06-28

## 2023-06-27 RX ORDER — SODIUM CHLORIDE 0.9 % (FLUSH) 0.9 %
5-40 SYRINGE (ML) INJECTION PRN
Status: CANCELLED | OUTPATIENT
Start: 2023-06-28

## 2023-06-27 RX ORDER — ALBUTEROL SULFATE 90 UG/1
4 AEROSOL, METERED RESPIRATORY (INHALATION) PRN
Status: CANCELLED | OUTPATIENT
Start: 2023-06-28

## 2023-06-27 RX ORDER — FAMOTIDINE 10 MG/ML
20 INJECTION, SOLUTION INTRAVENOUS
Status: CANCELLED | OUTPATIENT
Start: 2023-06-28

## 2023-06-27 RX ORDER — SODIUM CHLORIDE 9 MG/ML
5-250 INJECTION, SOLUTION INTRAVENOUS PRN
Status: CANCELLED | OUTPATIENT
Start: 2023-06-28

## 2023-06-27 RX ORDER — HEPARIN SODIUM 100 [USP'U]/ML
500 INJECTION, SOLUTION INTRAVENOUS PRN
Status: CANCELLED | OUTPATIENT
Start: 2023-06-28

## 2023-06-27 RX ORDER — DIPHENHYDRAMINE HYDROCHLORIDE 50 MG/ML
50 INJECTION INTRAMUSCULAR; INTRAVENOUS
Status: CANCELLED | OUTPATIENT
Start: 2023-06-28

## 2023-06-27 RX ORDER — SODIUM CHLORIDE 9 MG/ML
INJECTION, SOLUTION INTRAVENOUS CONTINUOUS
Status: CANCELLED | OUTPATIENT
Start: 2023-06-28

## 2023-06-27 RX ORDER — EPINEPHRINE 1 MG/ML
0.3 INJECTION, SOLUTION, CONCENTRATE INTRAVENOUS PRN
Status: CANCELLED | OUTPATIENT
Start: 2023-06-28

## 2023-06-27 RX ORDER — ONDANSETRON 2 MG/ML
8 INJECTION INTRAMUSCULAR; INTRAVENOUS
Status: CANCELLED | OUTPATIENT
Start: 2023-06-28

## 2023-06-27 RX ADMIN — CEFTRIAXONE SODIUM 1000 MG: 1 INJECTION, POWDER, FOR SOLUTION INTRAMUSCULAR; INTRAVENOUS at 11:37

## 2023-06-28 ENCOUNTER — OFFICE VISIT (OUTPATIENT)
Dept: OBGYN CLINIC | Age: 78
End: 2023-06-28
Payer: MEDICARE

## 2023-06-28 ENCOUNTER — HOSPITAL ENCOUNTER (OUTPATIENT)
Dept: INFUSION THERAPY | Age: 78
Setting detail: INFUSION SERIES
Discharge: HOME | End: 2023-06-28
Payer: MEDICARE

## 2023-06-28 VITALS
HEART RATE: 65 BPM | TEMPERATURE: 97.8 F | SYSTOLIC BLOOD PRESSURE: 162 MMHG | RESPIRATION RATE: 18 BRPM | DIASTOLIC BLOOD PRESSURE: 71 MMHG

## 2023-06-28 VITALS
WEIGHT: 130 LBS | DIASTOLIC BLOOD PRESSURE: 62 MMHG | BODY MASS INDEX: 25.52 KG/M2 | HEIGHT: 60 IN | HEART RATE: 60 BPM | SYSTOLIC BLOOD PRESSURE: 128 MMHG

## 2023-06-28 DIAGNOSIS — N39.0 RECURRENT UTI: Primary | ICD-10-CM

## 2023-06-28 DIAGNOSIS — R30.0 DYSURIA: Primary | ICD-10-CM

## 2023-06-28 DIAGNOSIS — N39.0 RECURRENT UTI: ICD-10-CM

## 2023-06-28 LAB
BILIRUB UR QL STRIP: NEGATIVE
CLARITY UR: CLEAR
COLOR UR: YELLOW
GLUCOSE UR STRIP-MCNC: NEGATIVE MG/DL
HGB UR QL STRIP: NEGATIVE
KETONES UR STRIP-MCNC: NEGATIVE MG/DL
LEUKOCYTE ESTERASE UR QL STRIP: NEGATIVE
NITRITE UR QL STRIP: NEGATIVE
PH UR STRIP: 6.5 [PH] (ref 5–9)
PROT UR STRIP-MCNC: NEGATIVE MG/DL
SP GR UR STRIP: 1.02 (ref 1–1.03)
UROBILINOGEN UR STRIP-ACNC: 0.2 E.U./DL

## 2023-06-28 PROCEDURE — 96365 THER/PROPH/DIAG IV INF INIT: CPT

## 2023-06-28 PROCEDURE — 99213 OFFICE O/P EST LOW 20 MIN: CPT | Performed by: OBSTETRICS & GYNECOLOGY

## 2023-06-28 PROCEDURE — 1123F ACP DISCUSS/DSCN MKR DOCD: CPT | Performed by: OBSTETRICS & GYNECOLOGY

## 2023-06-28 PROCEDURE — 6360000002 HC RX W HCPCS: Performed by: OBSTETRICS & GYNECOLOGY

## 2023-06-28 PROCEDURE — 2580000003 HC RX 258: Performed by: OBSTETRICS & GYNECOLOGY

## 2023-06-28 RX ORDER — EPINEPHRINE 1 MG/ML
0.3 INJECTION, SOLUTION, CONCENTRATE INTRAVENOUS PRN
Status: CANCELLED | OUTPATIENT
Start: 2023-06-29

## 2023-06-28 RX ORDER — FAMOTIDINE 10 MG/ML
20 INJECTION, SOLUTION INTRAVENOUS
Status: CANCELLED | OUTPATIENT
Start: 2023-06-29

## 2023-06-28 RX ORDER — ACETAMINOPHEN 325 MG/1
650 TABLET ORAL
Status: CANCELLED | OUTPATIENT
Start: 2023-06-29

## 2023-06-28 RX ORDER — DIPHENHYDRAMINE HYDROCHLORIDE 50 MG/ML
50 INJECTION INTRAMUSCULAR; INTRAVENOUS
Status: CANCELLED | OUTPATIENT
Start: 2023-06-29

## 2023-06-28 RX ORDER — SODIUM CHLORIDE 0.9 % (FLUSH) 0.9 %
5-40 SYRINGE (ML) INJECTION PRN
Status: CANCELLED | OUTPATIENT
Start: 2023-06-29

## 2023-06-28 RX ORDER — SODIUM CHLORIDE 9 MG/ML
INJECTION, SOLUTION INTRAVENOUS CONTINUOUS
Status: CANCELLED | OUTPATIENT
Start: 2023-06-29

## 2023-06-28 RX ORDER — ALBUTEROL SULFATE 90 UG/1
4 AEROSOL, METERED RESPIRATORY (INHALATION) PRN
Status: CANCELLED | OUTPATIENT
Start: 2023-06-29

## 2023-06-28 RX ORDER — SODIUM CHLORIDE 9 MG/ML
5-250 INJECTION, SOLUTION INTRAVENOUS PRN
Status: CANCELLED | OUTPATIENT
Start: 2023-06-29

## 2023-06-28 RX ORDER — HEPARIN SODIUM 100 [USP'U]/ML
500 INJECTION, SOLUTION INTRAVENOUS PRN
Status: CANCELLED | OUTPATIENT
Start: 2023-06-29

## 2023-06-28 RX ORDER — CEPHALEXIN 500 MG/1
500 CAPSULE ORAL EVERY EVENING
Qty: 30 CAPSULE | Refills: 0 | Status: SHIPPED | OUTPATIENT
Start: 2023-06-28

## 2023-06-28 RX ORDER — ONDANSETRON 2 MG/ML
8 INJECTION INTRAMUSCULAR; INTRAVENOUS
Status: CANCELLED | OUTPATIENT
Start: 2023-06-29

## 2023-06-28 RX ADMIN — CEFTRIAXONE SODIUM 1000 MG: 1 INJECTION, POWDER, FOR SOLUTION INTRAMUSCULAR; INTRAVENOUS at 11:34

## 2023-06-29 ENCOUNTER — HOSPITAL ENCOUNTER (OUTPATIENT)
Dept: INFUSION THERAPY | Age: 78
Setting detail: INFUSION SERIES
Discharge: HOME | End: 2023-06-29
Payer: MEDICARE

## 2023-06-29 VITALS
TEMPERATURE: 97.9 F | RESPIRATION RATE: 18 BRPM | HEART RATE: 51 BPM | DIASTOLIC BLOOD PRESSURE: 67 MMHG | SYSTOLIC BLOOD PRESSURE: 142 MMHG

## 2023-06-29 DIAGNOSIS — N39.0 RECURRENT UTI: Primary | ICD-10-CM

## 2023-06-29 LAB — BACTERIA UR CULT: NORMAL

## 2023-06-29 PROCEDURE — 2580000003 HC RX 258: Performed by: OBSTETRICS & GYNECOLOGY

## 2023-06-29 PROCEDURE — 6360000002 HC RX W HCPCS: Performed by: OBSTETRICS & GYNECOLOGY

## 2023-06-29 PROCEDURE — 96365 THER/PROPH/DIAG IV INF INIT: CPT

## 2023-06-29 RX ORDER — ACETAMINOPHEN 325 MG/1
650 TABLET ORAL
Status: CANCELLED | OUTPATIENT
Start: 2023-06-30

## 2023-06-29 RX ORDER — ALBUTEROL SULFATE 90 UG/1
4 AEROSOL, METERED RESPIRATORY (INHALATION) PRN
Status: CANCELLED | OUTPATIENT
Start: 2023-06-30

## 2023-06-29 RX ORDER — SODIUM CHLORIDE 9 MG/ML
INJECTION, SOLUTION INTRAVENOUS CONTINUOUS
Status: CANCELLED | OUTPATIENT
Start: 2023-06-30

## 2023-06-29 RX ORDER — SODIUM CHLORIDE 0.9 % (FLUSH) 0.9 %
5-40 SYRINGE (ML) INJECTION PRN
Status: CANCELLED | OUTPATIENT
Start: 2023-06-30

## 2023-06-29 RX ORDER — HEPARIN SODIUM 100 [USP'U]/ML
500 INJECTION, SOLUTION INTRAVENOUS PRN
Status: CANCELLED | OUTPATIENT
Start: 2023-06-30

## 2023-06-29 RX ORDER — FAMOTIDINE 10 MG/ML
20 INJECTION, SOLUTION INTRAVENOUS
Status: CANCELLED | OUTPATIENT
Start: 2023-06-30

## 2023-06-29 RX ORDER — EPINEPHRINE 1 MG/ML
0.3 INJECTION, SOLUTION, CONCENTRATE INTRAVENOUS PRN
Status: CANCELLED | OUTPATIENT
Start: 2023-06-30

## 2023-06-29 RX ORDER — SODIUM CHLORIDE 9 MG/ML
5-250 INJECTION, SOLUTION INTRAVENOUS PRN
Status: CANCELLED | OUTPATIENT
Start: 2023-06-30

## 2023-06-29 RX ORDER — DIPHENHYDRAMINE HYDROCHLORIDE 50 MG/ML
50 INJECTION INTRAMUSCULAR; INTRAVENOUS
Status: CANCELLED | OUTPATIENT
Start: 2023-06-30

## 2023-06-29 RX ORDER — ONDANSETRON 2 MG/ML
8 INJECTION INTRAMUSCULAR; INTRAVENOUS
Status: CANCELLED | OUTPATIENT
Start: 2023-06-30

## 2023-06-29 RX ADMIN — CEFTRIAXONE SODIUM 1000 MG: 1 INJECTION, POWDER, FOR SOLUTION INTRAMUSCULAR; INTRAVENOUS at 11:40

## 2023-06-30 ENCOUNTER — HOSPITAL ENCOUNTER (OUTPATIENT)
Dept: INFUSION THERAPY | Age: 78
Setting detail: INFUSION SERIES
Discharge: HOME | End: 2023-06-30
Payer: MEDICARE

## 2023-06-30 VITALS — SYSTOLIC BLOOD PRESSURE: 144 MMHG | DIASTOLIC BLOOD PRESSURE: 68 MMHG | TEMPERATURE: 97.7 F | HEART RATE: 45 BPM

## 2023-06-30 DIAGNOSIS — N39.0 RECURRENT UTI: Primary | ICD-10-CM

## 2023-06-30 PROCEDURE — 2580000003 HC RX 258: Performed by: OBSTETRICS & GYNECOLOGY

## 2023-06-30 PROCEDURE — 96365 THER/PROPH/DIAG IV INF INIT: CPT

## 2023-06-30 PROCEDURE — 6360000002 HC RX W HCPCS: Performed by: OBSTETRICS & GYNECOLOGY

## 2023-06-30 RX ORDER — SODIUM CHLORIDE 0.9 % (FLUSH) 0.9 %
5-40 SYRINGE (ML) INJECTION PRN
Status: CANCELLED | OUTPATIENT
Start: 2023-07-01

## 2023-06-30 RX ORDER — ALBUTEROL SULFATE 90 UG/1
4 AEROSOL, METERED RESPIRATORY (INHALATION) PRN
Status: CANCELLED | OUTPATIENT
Start: 2023-07-01

## 2023-06-30 RX ORDER — ONDANSETRON 2 MG/ML
8 INJECTION INTRAMUSCULAR; INTRAVENOUS
Status: CANCELLED | OUTPATIENT
Start: 2023-07-01

## 2023-06-30 RX ORDER — SODIUM CHLORIDE 9 MG/ML
5-250 INJECTION, SOLUTION INTRAVENOUS PRN
Status: CANCELLED | OUTPATIENT
Start: 2023-07-01

## 2023-06-30 RX ORDER — FAMOTIDINE 10 MG/ML
20 INJECTION, SOLUTION INTRAVENOUS
Status: CANCELLED | OUTPATIENT
Start: 2023-07-01

## 2023-06-30 RX ORDER — DIPHENHYDRAMINE HYDROCHLORIDE 50 MG/ML
50 INJECTION INTRAMUSCULAR; INTRAVENOUS
Status: CANCELLED | OUTPATIENT
Start: 2023-07-01

## 2023-06-30 RX ORDER — HEPARIN SODIUM 100 [USP'U]/ML
500 INJECTION, SOLUTION INTRAVENOUS PRN
Status: CANCELLED | OUTPATIENT
Start: 2023-07-01

## 2023-06-30 RX ORDER — SODIUM CHLORIDE 9 MG/ML
INJECTION, SOLUTION INTRAVENOUS CONTINUOUS
Status: CANCELLED | OUTPATIENT
Start: 2023-07-01

## 2023-06-30 RX ORDER — EPINEPHRINE 1 MG/ML
0.3 INJECTION, SOLUTION, CONCENTRATE INTRAVENOUS PRN
Status: CANCELLED | OUTPATIENT
Start: 2023-07-01

## 2023-06-30 RX ORDER — ACETAMINOPHEN 325 MG/1
650 TABLET ORAL
Status: CANCELLED | OUTPATIENT
Start: 2023-07-01

## 2023-06-30 RX ADMIN — CEFTRIAXONE SODIUM 1000 MG: 1 INJECTION, POWDER, FOR SOLUTION INTRAMUSCULAR; INTRAVENOUS at 10:54

## 2023-07-01 ENCOUNTER — HOSPITAL ENCOUNTER (OUTPATIENT)
Dept: INFUSION THERAPY | Age: 78
Setting detail: INFUSION SERIES
Discharge: HOME | End: 2023-07-01
Payer: MEDICARE

## 2023-07-01 DIAGNOSIS — N39.0 RECURRENT UTI: Primary | ICD-10-CM

## 2023-07-01 PROCEDURE — 6360000002 HC RX W HCPCS: Performed by: OBSTETRICS & GYNECOLOGY

## 2023-07-01 PROCEDURE — 2580000003 HC RX 258: Performed by: OBSTETRICS & GYNECOLOGY

## 2023-07-01 PROCEDURE — 96365 THER/PROPH/DIAG IV INF INIT: CPT

## 2023-07-01 RX ORDER — DIPHENHYDRAMINE HYDROCHLORIDE 50 MG/ML
50 INJECTION INTRAMUSCULAR; INTRAVENOUS
OUTPATIENT
Start: 2023-07-02

## 2023-07-01 RX ORDER — SODIUM CHLORIDE 0.9 % (FLUSH) 0.9 %
5-40 SYRINGE (ML) INJECTION PRN
OUTPATIENT
Start: 2023-07-02

## 2023-07-01 RX ORDER — EPINEPHRINE 1 MG/ML
0.3 INJECTION, SOLUTION, CONCENTRATE INTRAVENOUS PRN
OUTPATIENT
Start: 2023-07-02

## 2023-07-01 RX ORDER — SODIUM CHLORIDE 9 MG/ML
INJECTION, SOLUTION INTRAVENOUS CONTINUOUS
OUTPATIENT
Start: 2023-07-02

## 2023-07-01 RX ORDER — ACETAMINOPHEN 325 MG/1
650 TABLET ORAL
OUTPATIENT
Start: 2023-07-02

## 2023-07-01 RX ORDER — ALBUTEROL SULFATE 90 UG/1
4 AEROSOL, METERED RESPIRATORY (INHALATION) PRN
OUTPATIENT
Start: 2023-07-02

## 2023-07-01 RX ORDER — FAMOTIDINE 10 MG/ML
20 INJECTION, SOLUTION INTRAVENOUS
OUTPATIENT
Start: 2023-07-02

## 2023-07-01 RX ORDER — SODIUM CHLORIDE 9 MG/ML
5-250 INJECTION, SOLUTION INTRAVENOUS PRN
OUTPATIENT
Start: 2023-07-02

## 2023-07-01 RX ORDER — HEPARIN SODIUM 100 [USP'U]/ML
500 INJECTION, SOLUTION INTRAVENOUS PRN
OUTPATIENT
Start: 2023-07-02

## 2023-07-01 RX ORDER — ONDANSETRON 2 MG/ML
8 INJECTION INTRAMUSCULAR; INTRAVENOUS
OUTPATIENT
Start: 2023-07-02

## 2023-07-01 RX ADMIN — CEFTRIAXONE SODIUM 1000 MG: 1 INJECTION, POWDER, FOR SOLUTION INTRAMUSCULAR; INTRAVENOUS at 11:54

## 2023-07-02 ENCOUNTER — HOSPITAL ENCOUNTER (OUTPATIENT)
Dept: INFUSION THERAPY | Age: 78
Setting detail: INFUSION SERIES
Discharge: HOME | End: 2023-07-02
Payer: MEDICARE

## 2023-07-02 DIAGNOSIS — N39.0 RECURRENT UTI: Primary | ICD-10-CM

## 2023-07-02 PROCEDURE — 96365 THER/PROPH/DIAG IV INF INIT: CPT

## 2023-07-02 PROCEDURE — 6360000002 HC RX W HCPCS: Performed by: OBSTETRICS & GYNECOLOGY

## 2023-07-02 PROCEDURE — 2580000003 HC RX 258: Performed by: OBSTETRICS & GYNECOLOGY

## 2023-07-02 RX ORDER — SODIUM CHLORIDE 9 MG/ML
5-250 INJECTION, SOLUTION INTRAVENOUS PRN
OUTPATIENT
Start: 2023-07-02

## 2023-07-02 RX ORDER — ONDANSETRON 2 MG/ML
8 INJECTION INTRAMUSCULAR; INTRAVENOUS
OUTPATIENT
Start: 2023-07-02

## 2023-07-02 RX ORDER — DIPHENHYDRAMINE HYDROCHLORIDE 50 MG/ML
50 INJECTION INTRAMUSCULAR; INTRAVENOUS
OUTPATIENT
Start: 2023-07-02

## 2023-07-02 RX ORDER — EPINEPHRINE 1 MG/ML
0.3 INJECTION, SOLUTION, CONCENTRATE INTRAVENOUS PRN
OUTPATIENT
Start: 2023-07-02

## 2023-07-02 RX ORDER — ACETAMINOPHEN 325 MG/1
650 TABLET ORAL
OUTPATIENT
Start: 2023-07-02

## 2023-07-02 RX ORDER — SODIUM CHLORIDE 0.9 % (FLUSH) 0.9 %
5-40 SYRINGE (ML) INJECTION PRN
OUTPATIENT
Start: 2023-07-02

## 2023-07-02 RX ORDER — SODIUM CHLORIDE 9 MG/ML
INJECTION, SOLUTION INTRAVENOUS CONTINUOUS
OUTPATIENT
Start: 2023-07-02

## 2023-07-02 RX ORDER — ALBUTEROL SULFATE 90 UG/1
4 AEROSOL, METERED RESPIRATORY (INHALATION) PRN
OUTPATIENT
Start: 2023-07-02

## 2023-07-02 RX ORDER — FAMOTIDINE 10 MG/ML
20 INJECTION, SOLUTION INTRAVENOUS
OUTPATIENT
Start: 2023-07-02

## 2023-07-02 RX ORDER — HEPARIN SODIUM 100 [USP'U]/ML
500 INJECTION, SOLUTION INTRAVENOUS PRN
OUTPATIENT
Start: 2023-07-02

## 2023-07-02 RX ADMIN — CEFTRIAXONE SODIUM 1000 MG: 1 INJECTION, POWDER, FOR SOLUTION INTRAMUSCULAR; INTRAVENOUS at 11:47

## 2023-07-05 ENCOUNTER — HOSPITAL ENCOUNTER (OUTPATIENT)
Dept: INFUSION THERAPY | Age: 78
Setting detail: INFUSION SERIES
End: 2023-07-05

## 2023-07-31 RX ORDER — CEPHALEXIN 500 MG/1
500 CAPSULE ORAL EVERY EVENING
Qty: 30 CAPSULE | Refills: 4 | Status: SHIPPED | OUTPATIENT
Start: 2023-07-31

## 2023-07-31 NOTE — TELEPHONE ENCOUNTER
Pt calling in for refill. Doing well on suppressive therapy medication change. Needs refill sent to pharmacy. Please sign and send to pharm.  If appropriate

## 2023-10-30 ENCOUNTER — TELEPHONE (OUTPATIENT)
Dept: OBGYN CLINIC | Age: 78
End: 2023-10-30

## 2023-10-30 NOTE — TELEPHONE ENCOUNTER
Patient called and stated she hasn't had a UTI since end of June beginning of July has been taking the Keflex and has 1 refill left would like to know if she is going to stay on that or if you'll be changing it. Does she need an appointment, will need refills if she's to stay on it. Please call and advise. Thank you.

## 2023-10-31 RX ORDER — NITROFURANTOIN MACROCRYSTALS 50 MG/1
50 CAPSULE ORAL NIGHTLY
Qty: 30 CAPSULE | Refills: 2 | Status: SHIPPED | OUTPATIENT
Start: 2023-10-31 | End: 2024-01-29

## 2023-10-31 NOTE — TELEPHONE ENCOUNTER
Inform the patient that the medication will be switched to [facility] and is prescribed already . Call when refilling to switch back to keflex.

## 2023-11-06 ENCOUNTER — LAB (OUTPATIENT)
Dept: LAB | Facility: LAB | Age: 78
End: 2023-11-06
Payer: MEDICARE

## 2023-11-06 DIAGNOSIS — E53.8 DEFICIENCY OF OTHER SPECIFIED B GROUP VITAMINS: ICD-10-CM

## 2023-11-06 DIAGNOSIS — Z00.00 ENCOUNTER FOR GENERAL ADULT MEDICAL EXAMINATION WITHOUT ABNORMAL FINDINGS: Primary | ICD-10-CM

## 2023-11-06 DIAGNOSIS — E78.5 HYPERLIPIDEMIA, UNSPECIFIED: ICD-10-CM

## 2023-11-06 LAB
ALBUMIN SERPL BCP-MCNC: 4.1 G/DL (ref 3.4–5)
ALP SERPL-CCNC: 83 U/L (ref 33–136)
ALT SERPL W P-5'-P-CCNC: 13 U/L (ref 7–45)
ANION GAP SERPL CALC-SCNC: 15 MMOL/L (ref 10–20)
AST SERPL W P-5'-P-CCNC: 17 U/L (ref 9–39)
BILIRUB SERPL-MCNC: 1.6 MG/DL (ref 0–1.2)
BUN SERPL-MCNC: 14 MG/DL (ref 6–23)
CALCIUM SERPL-MCNC: 9.4 MG/DL (ref 8.6–10.3)
CHLORIDE SERPL-SCNC: 102 MMOL/L (ref 98–107)
CHOLEST SERPL-MCNC: 180 MG/DL (ref 0–199)
CHOLESTEROL/HDL RATIO: 2.8
CO2 SERPL-SCNC: 29 MMOL/L (ref 21–32)
CREAT SERPL-MCNC: 0.83 MG/DL (ref 0.5–1.05)
ERYTHROCYTE [DISTWIDTH] IN BLOOD BY AUTOMATED COUNT: 12.8 % (ref 11.5–14.5)
GFR SERPL CREATININE-BSD FRML MDRD: 72 ML/MIN/1.73M*2
GLUCOSE SERPL-MCNC: 107 MG/DL (ref 74–99)
HCT VFR BLD AUTO: 42 % (ref 36–46)
HDLC SERPL-MCNC: 64.6 MG/DL
HGB BLD-MCNC: 13.7 G/DL (ref 12–16)
LDLC SERPL CALC-MCNC: 89 MG/DL
MCH RBC QN AUTO: 30.4 PG (ref 26–34)
MCHC RBC AUTO-ENTMCNC: 32.6 G/DL (ref 32–36)
MCV RBC AUTO: 93 FL (ref 80–100)
NON HDL CHOLESTEROL: 115 MG/DL (ref 0–149)
NRBC BLD-RTO: 0 /100 WBCS (ref 0–0)
PLATELET # BLD AUTO: 262 X10*3/UL (ref 150–450)
POTASSIUM SERPL-SCNC: 3.7 MMOL/L (ref 3.5–5.3)
PROT SERPL-MCNC: 6.9 G/DL (ref 6.4–8.2)
RBC # BLD AUTO: 4.51 X10*6/UL (ref 4–5.2)
SODIUM SERPL-SCNC: 142 MMOL/L (ref 136–145)
TRIGL SERPL-MCNC: 130 MG/DL (ref 0–149)
VIT B12 SERPL-MCNC: 1446 PG/ML (ref 211–911)
VLDL: 26 MG/DL (ref 0–40)
WBC # BLD AUTO: 6 X10*3/UL (ref 4.4–11.3)

## 2023-11-06 PROCEDURE — 36415 COLL VENOUS BLD VENIPUNCTURE: CPT

## 2023-11-06 PROCEDURE — 82607 VITAMIN B-12: CPT

## 2023-11-06 PROCEDURE — 85027 COMPLETE CBC AUTOMATED: CPT

## 2023-11-06 PROCEDURE — 80053 COMPREHEN METABOLIC PANEL: CPT

## 2023-11-06 PROCEDURE — 80061 LIPID PANEL: CPT

## 2023-11-15 ENCOUNTER — OFFICE VISIT (OUTPATIENT)
Dept: PRIMARY CARE | Facility: CLINIC | Age: 78
End: 2023-11-15
Payer: MEDICARE

## 2023-11-15 VITALS
HEIGHT: 60 IN | TEMPERATURE: 97.2 F | DIASTOLIC BLOOD PRESSURE: 65 MMHG | BODY MASS INDEX: 25.95 KG/M2 | SYSTOLIC BLOOD PRESSURE: 125 MMHG | WEIGHT: 132.2 LBS

## 2023-11-15 DIAGNOSIS — E66.3 OVERWEIGHT: ICD-10-CM

## 2023-11-15 DIAGNOSIS — E78.5 DYSLIPIDEMIA: ICD-10-CM

## 2023-11-15 DIAGNOSIS — I10 PRIMARY HYPERTENSION: Primary | ICD-10-CM

## 2023-11-15 DIAGNOSIS — E53.8 VITAMIN B12 DEFICIENCY: ICD-10-CM

## 2023-11-15 PROCEDURE — 99213 OFFICE O/P EST LOW 20 MIN: CPT | Performed by: FAMILY MEDICINE

## 2023-11-15 PROCEDURE — 3078F DIAST BP <80 MM HG: CPT | Performed by: FAMILY MEDICINE

## 2023-11-15 PROCEDURE — 1159F MED LIST DOCD IN RCRD: CPT | Performed by: FAMILY MEDICINE

## 2023-11-15 PROCEDURE — 1036F TOBACCO NON-USER: CPT | Performed by: FAMILY MEDICINE

## 2023-11-15 PROCEDURE — 3074F SYST BP LT 130 MM HG: CPT | Performed by: FAMILY MEDICINE

## 2023-11-15 RX ORDER — METOPROLOL SUCCINATE 100 MG/1
100 TABLET, EXTENDED RELEASE ORAL DAILY
COMMUNITY
End: 2024-03-06 | Stop reason: SDUPTHER

## 2023-11-15 RX ORDER — POTASSIUM CHLORIDE 750 MG/1
10 TABLET, EXTENDED RELEASE ORAL 2 TIMES DAILY
COMMUNITY
Start: 2021-03-01 | End: 2024-02-13

## 2023-11-15 RX ORDER — ASPIRIN 81 MG/1
81 TABLET ORAL DAILY
COMMUNITY
Start: 2009-09-02

## 2023-11-15 RX ORDER — HYDROCHLOROTHIAZIDE 12.5 MG/1
12.5 CAPSULE ORAL DAILY
COMMUNITY
Start: 2021-03-17 | End: 2023-12-28

## 2023-11-15 RX ORDER — ACETAMINOPHEN 500 MG
1 TABLET ORAL DAILY
COMMUNITY

## 2023-11-15 RX ORDER — ACETAMINOPHEN 500 MG
2000 TABLET ORAL DAILY
COMMUNITY

## 2023-11-15 RX ORDER — DILTIAZEM HYDROCHLORIDE 240 MG/1
240 CAPSULE, EXTENDED RELEASE ORAL DAILY
COMMUNITY
Start: 2009-09-02 | End: 2023-12-13

## 2023-11-15 RX ORDER — METHENAMINE HIPPURATE 1000 MG/1
0.5 TABLET ORAL DAILY
COMMUNITY
Start: 2023-07-14

## 2023-11-15 RX ORDER — NITROFURANTOIN MACROCRYSTALS 50 MG/1
1 CAPSULE ORAL NIGHTLY
COMMUNITY
Start: 2023-10-31 | End: 2024-01-29

## 2023-11-15 RX ORDER — PRAVASTATIN SODIUM 40 MG/1
40 TABLET ORAL NIGHTLY
COMMUNITY
End: 2023-12-15

## 2023-11-15 ASSESSMENT — ENCOUNTER SYMPTOMS
ENDOCRINE NEGATIVE: 1
NEUROLOGICAL NEGATIVE: 1
RESPIRATORY NEGATIVE: 1
PSYCHIATRIC NEGATIVE: 1
CARDIOVASCULAR NEGATIVE: 1
CONSTITUTIONAL NEGATIVE: 1
GASTROINTESTINAL NEGATIVE: 1
MUSCULOSKELETAL NEGATIVE: 1
ALLERGIC/IMMUNOLOGIC NEGATIVE: 1

## 2023-11-15 ASSESSMENT — PATIENT HEALTH QUESTIONNAIRE - PHQ9
SUM OF ALL RESPONSES TO PHQ9 QUESTIONS 1 AND 2: 0
1. LITTLE INTEREST OR PLEASURE IN DOING THINGS: NOT AT ALL
2. FEELING DOWN, DEPRESSED OR HOPELESS: NOT AT ALL

## 2023-11-15 NOTE — PROGRESS NOTES
Subjective   Is here today for follow-up on hypertension and hyperlipidemia.  She is overall been doing well.  She is seeing a gynecologist at [facility] and has been receiving treatments for her recurrent UTI.  She had received IV infusions of Rocephin several months ago.  She currently is now on nitrofurantoin every night for prophylaxis.  Her last UTI was June 2023.  She continues on her other meds noted.  She has no other complaints.    Patient ID: Shabana Adan is a 77 y.o. female who presents for Follow-up (Routine f/u):    Problem List Items Addressed This Visit    None     Past Medical History:   Diagnosis Date    Body mass index (BMI) 24.0-24.9, adult 05/09/2022    Body mass index (BMI) of 24.0 to 24.9 in adult    Body mass index (BMI) 24.0-24.9, adult 11/08/2021    BMI 24.0-24.9, adult    Disorder of pigmentation, unspecified 11/05/2021    Discoloration of skin of face    Personal history of diseases of the blood and blood-forming organs and certain disorders involving the immune mechanism 11/05/2021    History of anemia    Personal history of other benign neoplasm 05/09/2022    History of other benign neoplasm      Past Surgical History:   Procedure Laterality Date    OTHER SURGICAL HISTORY  11/05/2021    Lumpectomy    OTHER SURGICAL HISTORY  11/05/2021    Hysterectomy    OTHER SURGICAL HISTORY  11/05/2021    Wrist surgery    OTHER SURGICAL HISTORY  11/05/2021    Cataract surgery    OTHER SURGICAL HISTORY  11/05/2021    Leg surgery    OTHER SURGICAL HISTORY  11/08/2021    Colonoscopy    XR CHEST PACEMAKER WITH FLUORO  6/24/2019    XR CHEST PACEMAKER WITH FLUORO 6/24/2019 [facility]      Family History   Problem Relation Name Age of Onset    COPD Brother      Cancer Brother        Social History     Socioeconomic History    Marital status:      Spouse name: Not on file    Number of children: Not on file    Years of education: Not on file    Highest education level: Not on file    Occupational History    Not on file   Tobacco Use    Smoking status: Never    Smokeless tobacco: Never   Substance and Sexual Activity    Alcohol use: Never    Drug use: Never    Sexual activity: Not on file   Other Topics Concern    Not on file   Social History Narrative    Not on file     Social Determinants of Health     Financial Resource Strain: Not on file   Food Insecurity: Not on file   Transportation Needs: Not on file   Physical Activity: Not on file   Stress: Not on file   Social Connections: Not on file   Intimate Partner Violence: Not on file   Housing Stability: Not on file      Codeine, Hydrocodone-acetaminophen, Metronidazole, Sulfamethoxazole-trimethoprim, and Latex   Current Outpatient Medications   Medication Sig Dispense Refill    aspirin 81 mg EC tablet Take 1 tablet (81 mg) by mouth once daily.      calcium carbonate-vitamin D3 600 mg-20 mcg (800 unit) tablet Take 1 tablet by mouth once daily.      cholecalciferol (Vitamin D-3) 50 mcg (2,000 unit) capsule Take 1 capsule (50 mcg) by mouth early in the morning..      DILT- mg 24 hr capsule Take 1 capsule (240 mg) by mouth once daily.      hydroCHLOROthiazide (Microzide) 12.5 mg capsule Take 1 capsule (12.5 mg) by mouth once daily.      methenamine hippurate (Hiprex) 1 gram tablet Take 0.5 tablets (0.5 g) by mouth once daily.      metoprolol succinate XL (Toprol-XL) 100 mg 24 hr tablet Take 1 tablet (100 mg) by mouth once daily.      nitrofurantoin (Macrodantin) 50 mg capsule Take 1 capsule (50 mg) by mouth once daily at bedtime.      potassium chloride CR 10 mEq ER tablet Take 1 tablet (10 mEq) by mouth 2 times a day.      pravastatin (Pravachol) 40 mg tablet Take 1 tablet (40 mg) by mouth once daily at bedtime.       No current facility-administered medications for this visit.       Immunization History   Administered Date(s) Administered    Moderna SARS-CoV-2 Vaccination 03/04/2021, 04/01/2021, 12/18/2021, 06/03/2022        Review of  Systems   Constitutional: Negative.    HENT: Negative.     Respiratory: Negative.     Cardiovascular: Negative.    Gastrointestinal: Negative.    Endocrine: Negative.    Genitourinary: Negative.    Musculoskeletal: Negative.    Allergic/Immunologic: Negative.    Neurological: Negative.    Psychiatric/Behavioral: Negative.     All other systems reviewed and are negative.       Vitals:    11/15/23 0845   BP: 125/65   Temp: 36.2 °C (97.2 °F)     Vitals:    11/15/23 0845   Weight: 60 kg (132 lb 3.2 oz)      Physical Exam  Constitutional:       General: She is not in acute distress.     Appearance: Normal appearance.   Cardiovascular:      Rate and Rhythm: Normal rate.      Pulses: Normal pulses.      Heart sounds: Normal heart sounds. No murmur heard.     No friction rub. No gallop.   Pulmonary:      Effort: Pulmonary effort is normal. No respiratory distress.      Breath sounds: Normal breath sounds. No wheezing or rales.   Neurological:      Mental Status: She is alert.   Psychiatric:         Mood and Affect: Mood normal.         Thought Content: Thought content normal.          ASSESSMENT/PLAN: Hypertension stable  Hyperlipidemia stable with cholesterol 180 and LDL 89  Vitamin B12 deficiency improved  Recurrent UTI managed by gynecology    Plan-continue current medications noted  Labs as noted are up-to-date  Follow-up with gynecology for treatment of recurrent UTI  Recommended colonoscopy and mammograms with patient refuses  Exercise regularly  Follow-up in 6 months and call as needed

## 2023-12-12 DIAGNOSIS — I10 PRIMARY HYPERTENSION: Primary | ICD-10-CM

## 2023-12-13 RX ORDER — DILTIAZEM HYDROCHLORIDE 240 MG/1
240 CAPSULE, EXTENDED RELEASE ORAL DAILY
Qty: 90 CAPSULE | Refills: 1 | Status: SHIPPED | OUTPATIENT
Start: 2023-12-13

## 2023-12-14 DIAGNOSIS — E78.5 DYSLIPIDEMIA: Primary | ICD-10-CM

## 2023-12-15 RX ORDER — PRAVASTATIN SODIUM 40 MG/1
40 TABLET ORAL NIGHTLY
Qty: 90 TABLET | Refills: 1 | Status: SHIPPED | OUTPATIENT
Start: 2023-12-15

## 2024-01-24 ENCOUNTER — TELEPHONE (OUTPATIENT)
Dept: OBGYN CLINIC | Age: 79
End: 2024-01-24

## 2024-01-24 NOTE — TELEPHONE ENCOUNTER
Patient called and stated that she only has 6 days left of macrobid left and was wondering if you're changing her back to keflex? Patient needs prescription to go to [facility] in [place]. Please and thank you.

## 2024-01-26 RX ORDER — CEPHALEXIN 250 MG/1
250 CAPSULE ORAL EVERY EVENING
Qty: 30 CAPSULE | Refills: 3 | Status: SHIPPED | OUTPATIENT
Start: 2024-01-26

## 2024-01-26 NOTE — TELEPHONE ENCOUNTER
Inform Terri that I switched the antibiotics to keflex . Please advise her for her next refills to prompt us to change back to macrobid , and so on .

## 2024-02-10 DIAGNOSIS — I10 PRIMARY HYPERTENSION: ICD-10-CM

## 2024-02-13 RX ORDER — POTASSIUM CHLORIDE 750 MG/1
10 TABLET, EXTENDED RELEASE ORAL 2 TIMES DAILY
Qty: 180 TABLET | Refills: 1 | Status: SHIPPED | OUTPATIENT
Start: 2024-02-13

## 2024-03-06 DIAGNOSIS — I10 PRIMARY HYPERTENSION: Primary | ICD-10-CM

## 2024-03-07 RX ORDER — METOPROLOL SUCCINATE 100 MG/1
100 TABLET, EXTENDED RELEASE ORAL DAILY
Qty: 90 TABLET | Refills: 1 | Status: SHIPPED | OUTPATIENT
Start: 2024-03-07

## 2024-03-08 NOTE — TELEPHONE ENCOUNTER
Patient left voicemail requesting the status of her medication refill.  Patient was notified that medication was sent for refill.

## 2024-04-01 ENCOUNTER — HOSPITAL ENCOUNTER (EMERGENCY)
Age: 79
Discharge: HOME | End: 2024-04-01
Attending: EMERGENCY MEDICINE
Payer: MEDICARE

## 2024-04-01 ENCOUNTER — APPOINTMENT (OUTPATIENT)
Dept: GENERAL RADIOLOGY | Age: 79
End: 2024-04-01
Payer: MEDICARE

## 2024-04-01 VITALS
BODY MASS INDEX: 25.52 KG/M2 | WEIGHT: 130 LBS | OXYGEN SATURATION: 100 % | HEART RATE: 75 BPM | RESPIRATION RATE: 20 BRPM | TEMPERATURE: 97.6 F | HEIGHT: 60 IN | DIASTOLIC BLOOD PRESSURE: 58 MMHG | SYSTOLIC BLOOD PRESSURE: 132 MMHG

## 2024-04-01 DIAGNOSIS — S42.292A HUMERAL HEAD FRACTURE, LEFT, CLOSED, INITIAL ENCOUNTER: Primary | ICD-10-CM

## 2024-04-01 PROCEDURE — 73030 X-RAY EXAM OF SHOULDER: CPT

## 2024-04-01 PROCEDURE — 99285 EMERGENCY DEPT VISIT HI MDM: CPT

## 2024-04-01 PROCEDURE — 6370000000 HC RX 637 (ALT 250 FOR IP): Performed by: EMERGENCY MEDICINE

## 2024-04-01 PROCEDURE — 73060 X-RAY EXAM OF HUMERUS: CPT

## 2024-04-01 RX ORDER — OXYCODONE HYDROCHLORIDE AND ACETAMINOPHEN 5; 325 MG/1; MG/1
1 TABLET ORAL ONCE
Status: COMPLETED | OUTPATIENT
Start: 2024-04-01 | End: 2024-04-01

## 2024-04-01 RX ORDER — OXYCODONE HYDROCHLORIDE AND ACETAMINOPHEN 5; 325 MG/1; MG/1
1-2 TABLET ORAL EVERY 4 HOURS PRN
Qty: 15 TABLET | Refills: 0 | Status: SHIPPED | OUTPATIENT
Start: 2024-04-01 | End: 2024-04-04

## 2024-04-01 RX ADMIN — OXYCODONE HYDROCHLORIDE AND ACETAMINOPHEN 1 TABLET: 5; 325 TABLET ORAL at 11:12

## 2024-04-01 ASSESSMENT — PAIN SCALES - GENERAL
PAINLEVEL_OUTOF10: 2
PAINLEVEL_OUTOF10: 3
PAINLEVEL_OUTOF10: 8

## 2024-04-01 ASSESSMENT — PAIN DESCRIPTION - ORIENTATION
ORIENTATION: UPPER
ORIENTATION: LEFT
ORIENTATION: LEFT

## 2024-04-01 ASSESSMENT — PAIN DESCRIPTION - PAIN TYPE: TYPE: ACUTE PAIN

## 2024-04-01 ASSESSMENT — PAIN DESCRIPTION - LOCATION
LOCATION: ARM

## 2024-04-01 ASSESSMENT — LIFESTYLE VARIABLES
HOW OFTEN DO YOU HAVE A DRINK CONTAINING ALCOHOL: NEVER
HOW OFTEN DO YOU HAVE A DRINK CONTAINING ALCOHOL: MONTHLY OR LESS
HOW MANY STANDARD DRINKS CONTAINING ALCOHOL DO YOU HAVE ON A TYPICAL DAY: 1 OR 2
HOW MANY STANDARD DRINKS CONTAINING ALCOHOL DO YOU HAVE ON A TYPICAL DAY: PATIENT DOES NOT DRINK

## 2024-04-01 ASSESSMENT — PAIN DESCRIPTION - DESCRIPTORS
DESCRIPTORS: ACHING
DESCRIPTORS: ACHING

## 2024-04-01 ASSESSMENT — ENCOUNTER SYMPTOMS
VOMITING: 0
EYE PAIN: 0
SHORTNESS OF BREATH: 0
CHEST TIGHTNESS: 0

## 2024-04-01 ASSESSMENT — PAIN - FUNCTIONAL ASSESSMENT: PAIN_FUNCTIONAL_ASSESSMENT: 0-10

## 2024-04-01 NOTE — DISCHARGE INSTR - COC
N76.0, B96.89       Isolation/Infection:   Isolation            No Isolation          Patient Infection Status       Infection Onset Added Last Indicated Last Indicated By Review Planned Expiration Resolved Resolved By    MRSA 08/13/21 08/16/21 12/14/21 Culture, Urine                Nurse Assessment:  Last Vital Signs: BP (!) 132/58   Pulse 75   Temp 97.6 °F (36.4 °C) (Oral)   Resp 20   Ht 1.524 m (5')   Wt 59 kg (130 lb)   LMP 09/27/1996   SpO2 100%   BMI 25.39 kg/m²     Last documented pain score (0-10 scale): Pain Level: 3  Last Weight:   Wt Readings from Last 1 Encounters:   04/01/24 59 kg (130 lb)     Mental Status:  {IP PT MENTAL STATUS:20030}    IV Access:  { HERMELINDO IV ACCESS:659261550}    Nursing Mobility/ADLs:  Walking   {CHP DME ADLs:992350262}  Transfer  {CHP DME ADLs:605727231}  Bathing  {CHP DME ADLs:678115519}  Dressing  {CHP DME ADLs:944988858}  Toileting  {CHP DME ADLs:981946710}  Feeding  {CHP DME ADLs:849236492}  Med Admin  {P DME ADLs:494151950}  Med Delivery   { HERMELINDO MED Delivery:862682610}    Wound Care Documentation and Therapy:  Incision 10/04/18 Abdomen (Active)   Number of days: 2006       Incision 10/04/18 Perineum (Active)   Number of days: 2006        Elimination:  Continence:   Bowel: {YES / NO:19727}  Bladder: {YES / NO:19727}  Urinary Catheter: {Urinary Catheter:110741646}   Colostomy/Ileostomy/Ileal Conduit: {YES / NO:19727}       Date of Last BM: ***  No intake or output data in the 24 hours ending 04/01/24 1143  No intake/output data recorded.    Safety Concerns:     { HERMELINDO Safety Concerns:438923534}    Impairments/Disabilities:      { HERMELINDO Impairments/Disabilities:933932212}    Nutrition Therapy:  Current Nutrition Therapy:   { HERMELINDO Diet List:976503993}    Routes of Feeding: {CHP DME Other Feedings:280307061}  Liquids: {Slp liquid thickness:82720}  Daily Fluid Restriction: {CHP DME Yes amt example:762578988}  Last Modified Barium Swallow with Video (Video Swallowing

## 2024-04-01 NOTE — ED TRIAGE NOTES
Pt arrived by private car with report of a fall last night landing on her left arm   Pt states she tripped over a chair   Pt states she did not hit her head   Pt states she did not lose consciousness   Pt states when she moves her arm she has 8/10 pain   Pt admits to having one glass of wine prior to fall   Pt states she is not on any blood thinners

## 2024-04-01 NOTE — ED PROVIDER NOTES
fibrocystic breast /Bx / benign    COLONOSCOPY      ENDOSCOPY, COLON, DIAGNOSTIC      EYE SURGERY      phaco with IOL OU.    FRACTURE SURGERY Right     due to fracture / had skeletal hardware    DC COLONOSCOPY W/BIOPSY SINGLE/MULTIPLE N/A 4/10/2018    COLONOSCOPY WITH BIOPSYS [place] [facility] [place]    DC DELIGATION URETER N/A 10/4/2018    USLS/ SSLS performed [place] Senia Antonio [place] [facility] [place]    DC LAPS W/VAGINAL HYSTERECTOMY > 250 GRAMS N/A 10/4/2018    LAPARSCOPIC HYSTERECTOMY performed by Senia Antonio MD at [facility] [place]    VAGINA SURGERY N/A 10/4/2018    A-P REPAIR WITH TOT OBTRYX and SLING , CYSTOSCOPY performed by Senia Antonio [place] [facility] [place]         CURRENT MEDICATIONS       Previous Medications    ASPIRIN 81 MG TABLET    Take by mouth daily     CALCIUM CITRATE-VITAMIN D (CALCIUM CITRATE + D3 PO)    Take by mouth 2 times daily     CEPHALEXIN (KEFLEX) 250 MG CAPSULE    Take 1 capsule by mouth every evening    CEPHALEXIN (KEFLEX) 500 MG CAPSULE    Take 1 capsule by mouth every evening    CHOLECALCIFEROL (VITAMIN D) 2000 UNITS CAPS CAPSULE    Take by mouth Daily with supper    DILTIAZEM (CARDIZEM CD) 240 MG ER CAPSULE    Take 1 capsule by mouth daily    HYDROCHLOROTHIAZIDE (MICROZIDE) 12.5 MG CAPSULE    TAKE ONE CAPSULE BY MOUTH EVERY DAY IN THE MORNING    METOPROLOL SUCCINATE (TOPROL XL) 100 MG EXTENDED RELEASE TABLET    TAKE ONE TABLET BY MOUTH IN THE MORNING    POTASSIUM CHLORIDE (KLOR-CON) 10 MEQ EXTENDED RELEASE TABLET    TAKE ONE TABLET BY MOUTH TWO TIMES A DAY    PRAVASTATIN (PRAVACHOL) 40 MG TABLET        VITAMIN B-12 (CYANOCOBALAMIN) 1000 MCG TABLET    Take 1 tablet by mouth daily       ALLERGIES     Latex, Bactrim [sulfamethoxazole-trimethoprim], Codeine, and Vicodin [hydrocodone-acetaminophen]    FAMILY HISTORY       Family History   Problem Relation Age of Onset    High Blood Pressure Mother     Other Father         DVTs 1960    Heart Attack Father     Asthma Brother     Cancer Brother

## 2024-04-04 ENCOUNTER — OFFICE VISIT (OUTPATIENT)
Dept: ORTHOPEDIC SURGERY | Age: 79
End: 2024-04-04
Payer: MEDICARE

## 2024-04-04 VITALS
WEIGHT: 130 LBS | BODY MASS INDEX: 25.52 KG/M2 | OXYGEN SATURATION: 98 % | TEMPERATURE: 97.5 F | HEIGHT: 60 IN | HEART RATE: 64 BPM

## 2024-04-04 DIAGNOSIS — S42.292A HUMERAL HEAD FRACTURE, LEFT, CLOSED, INITIAL ENCOUNTER: Primary | ICD-10-CM

## 2024-04-04 DIAGNOSIS — Z09 HOSPITAL DISCHARGE FOLLOW-UP: ICD-10-CM

## 2024-04-04 PROCEDURE — 23600 CLTX PROX HUMRL FX W/O MNPJ: CPT | Performed by: PHYSICIAN ASSISTANT

## 2024-04-04 PROCEDURE — 99203 OFFICE O/P NEW LOW 30 MIN: CPT | Performed by: PHYSICIAN ASSISTANT

## 2024-04-04 PROCEDURE — 1111F DSCHRG MED/CURRENT MED MERGE: CPT | Performed by: PHYSICIAN ASSISTANT

## 2024-04-04 PROCEDURE — 1123F ACP DISCUSS/DSCN MKR DOCD: CPT | Performed by: PHYSICIAN ASSISTANT

## 2024-04-04 ASSESSMENT — ENCOUNTER SYMPTOMS
EYES NEGATIVE: 1
RESPIRATORY NEGATIVE: 1
GASTROINTESTINAL NEGATIVE: 1

## 2024-04-04 NOTE — PROGRESS NOTES
Terri Sanchez (:  1945) is a 78 y.o. female,New patient, here for evaluation of the following chief complaint(s):  Follow-Up from Hospital (Pt  states she had a fall 3/31/24 landed on her left arm /Pt states she tripped over a chair /Pt states she did not hit her head /Pt states she did not lose consciousness /Pt states when she moves her arm she has 8/10 pain /Pt admits to having one glass of wine prior to fall /Pt states she is not on any blood thinners /)         ASSESSMENT/PLAN:  1. Humeral head fracture, left, closed, initial encounter      No follow-ups on file.         Subjective   SUBJECTIVE/OBJECTIVE:  This is a 78-year-old right-hand-dominant female complaining of left shoulder pain.  Patient states she tripped and fell while at home 2024.  X-rays emergency department showed nondisplaced fracture of the humeral head.  Patient was placed in a sling and swath.  She states the Percocet made her loopy.  She denies change in sensation of the arm.  She states her wrist and hand range of motion has improved significantly since her time in the emergency department.        Review of Systems   Constitutional: Negative.    HENT: Negative.     Eyes: Negative.    Respiratory: Negative.     Gastrointestinal: Negative.    Genitourinary: Negative.    Musculoskeletal: Negative.    Psychiatric/Behavioral: Negative.            Objective   EXAMINATION:  TWO XRAY VIEWS OF THE LEFT HUMERUS; THREE XRAY VIEWS OF THE LEFT SHOULDER     2024 10:33 am     COMPARISON:  None.     HISTORY:  ORDERING SYSTEM PROVIDED HISTORY: fall  TECHNOLOGIST PROVIDED HISTORY:  Reason for exam:->fall  What reading provider will be dictating this exam?->CRC     FINDINGS:  There is a fracture of the greater tuberosity of the humerus.  There is no  significant displacement.  The humeral head is in normal alignment.  There is  mild generalized osteopenia.     IMPRESSION:  Fracture of the greater tuberosity of the humerus.

## 2024-04-11 NOTE — TELEPHONE ENCOUNTER
Terri called this morning requesting refill of Methenamine. She states she's been taking 1/2 tablet daily and she hasn't had a UTI in over a year. Medication pending; Pharmacy verified.

## 2024-04-12 RX ORDER — METHENAMINE HIPPURATE 1000 MG/1
0.5 TABLET ORAL DAILY
Qty: 60 TABLET | Refills: 1 | Status: SHIPPED | OUTPATIENT
Start: 2024-04-12

## 2024-04-18 ENCOUNTER — OFFICE VISIT (OUTPATIENT)
Dept: ORTHOPEDIC SURGERY | Age: 79
End: 2024-04-18

## 2024-04-18 ENCOUNTER — HOSPITAL ENCOUNTER (OUTPATIENT)
Dept: ORTHOPEDIC SURGERY | Age: 79
Discharge: HOME | End: 2024-04-20
Payer: MEDICARE

## 2024-04-18 VITALS
HEIGHT: 60 IN | BODY MASS INDEX: 24.54 KG/M2 | WEIGHT: 125 LBS | OXYGEN SATURATION: 98 % | HEART RATE: 64 BPM | TEMPERATURE: 97.8 F

## 2024-04-18 DIAGNOSIS — S42.292A HUMERAL HEAD FRACTURE, LEFT, CLOSED, INITIAL ENCOUNTER: Primary | ICD-10-CM

## 2024-04-18 DIAGNOSIS — S42.292A HUMERAL HEAD FRACTURE, LEFT, CLOSED, INITIAL ENCOUNTER: ICD-10-CM

## 2024-04-18 PROCEDURE — 99024 POSTOP FOLLOW-UP VISIT: CPT | Performed by: PHYSICIAN ASSISTANT

## 2024-04-18 PROCEDURE — 73060 X-RAY EXAM OF HUMERUS: CPT

## 2024-04-18 ASSESSMENT — ENCOUNTER SYMPTOMS
RESPIRATORY NEGATIVE: 1
GASTROINTESTINAL NEGATIVE: 1
EYES NEGATIVE: 1

## 2024-04-18 NOTE — PROGRESS NOTES
Terri Sanchez (:  1945) is a 78 y.o. female,Established patient, here for evaluation of the following chief complaint(s):  Follow-up (F/u Humeral head fracture, left, closed/States still having trouble sleeping)      Assessment & Plan   1. Humeral head fracture, left, closed, initial encounter  -     XR HUMERUS LEFT (MIN 2 VIEWS); Future      No follow-ups on file.       Subjective   This is a 78-year-old right-hand-dominant female following up complaint of left shoulder pain.  She fell 2024 landing on her outstretched left arm.  X-rays emergency department showed nondisplaced fracture of the humeral head.  Patient has been in a sling and swath.  She states the strap around her back is causing her more pain than the fracture.  She is not taking any pain medication except for Tylenol.  She has difficulty raising her arm away from her body.        Review of Systems   Constitutional: Negative.    HENT: Negative.     Eyes: Negative.    Respiratory: Negative.     Gastrointestinal: Negative.    Genitourinary: Negative.    Musculoskeletal: Negative.    Psychiatric/Behavioral: Negative.            Objective   Physical Exam  Musculoskeletal:      Comments: Left shoulder-no acromioclavicular, clavicle, SC joint tenderness with palpation.  Internal rotation is 0 degrees, external rotation is painful at 90 degrees.  Patient is unable to abduct the left arm above shoulder level.  Biceps contour is intact.  Sensation is intact distally to light touch.     Explained the x-rays to the patient that show the nondisplaced fracture of the humeral head.  Would like her to come out of the swath portion of the sling and swath.  She will begin Codman exercises.  She will begin range of motion exercises and physical therapy beginning next week with no weights.  Follow-up with me in about 3 weeks.       On this date 2024 I have spent 35 minutes reviewing previous notes, test results and face to face with the

## 2024-04-26 ENCOUNTER — HOSPITAL ENCOUNTER (OUTPATIENT)
Dept: PHYSICAL THERAPY | Age: 79
Setting detail: THERAPIES SERIES
Discharge: HOME | End: 2024-04-26
Payer: MEDICARE

## 2024-04-26 PROCEDURE — 97162 PT EVAL MOD COMPLEX 30 MIN: CPT

## 2024-04-26 PROCEDURE — 97110 THERAPEUTIC EXERCISES: CPT

## 2024-04-26 NOTE — THERAPY EVALUATION
to increase ease with functional mobility tasks & ADL's New   LTG 3 The patient will demonstrate improved L UE strength >/= 4/5 in order to increase ease with functional mobility tasks & ADL's New   LTG 4 The patient will have an increase in UEFI score >/=9 points in order to increase functional activity tolerance. New     TREATMENT PLAN       Requires PT Follow-Up: Yes    Treatment may include any combination of the following: Strengthening, ROM, Functional mobility training, ADL/Self-care training, IADL training, Manual, Safety education & training, Patient/Caregiver education & training, Home exercise program, Therapeutic activities, Pain management, Group Therapy, Neuromuscular re-education, Endurance training, Equipment evaluation, education, & procurement, Modalities, Positioning  Modalities: Vasopneumatic Device, Heat/Cold     Frequency / Duration:  Patient to be seen 2xs/wk times per week for 6-8 weeks weeks  Plan Comment:            Eval Complexity:   Decision Making: Medium Complexity  History: Personal Factors and/or Comorbidities Impacting POC: Medium  Examination of body system(s) including body structures and functions, activity limitations, and/or participation restrictions: Medium  Exam: UEFI = to be completed next session  Clinical Presentation: Medium    POST-PAIN     Pain Rating (0-10 pain scale): 0  /10  Location and pain description same as pre-treatment unless indicated.   Action: [] NA  [] Call Physician  [x] Perform HEP  [] Meds as prescribed    Evaluation and patient rights have been reviewed and patient agrees with plan of care.  Yes  [x]  No  []   Explain:     Newsome Fall Risk Assessment  Risk Factor Scale  Score   History of Falls [x] Yes  [] No 25  0    Secondary Diagnosis [] Yes  [x] No 15  0    Ambulatory Aid [] Furniture  [] Crutches/cane/walker  [x] None/bedrest/wheelchair/nurse 30  15  0    IV/Heparin Lock [] Yes  [x] No 20  0    Gait/Transferring [] Impaired  [] Weak  [x]

## 2024-04-26 NOTE — PLAN OF CARE
[address] [place] [address]  Phone: [phone]    [x] Certification  [] Recertification [x]  Plan of Care  [] Progress Note [] Discharge      Referring Provider: Huang Smith PA     From:  Batsheva Naik, PT, DPT  Patient: Terri Sanchez (78 y.o. female) : 1945 Date: 2024  Medical Diagnosis: Humeral head fracture, left, closed, initial encounter [S42.292A] Humeral head fracture, left, closed Diagnosis: Humeral head fracture, left, closed   Treatment Diagnosis: decreased ability to perform functional mobility tasks & ADLs, decreased left UE ROM & strength, & increased left UE pain    Plan of Care/Certification Expiration Date: 24   Progress Report Period from:  2024  to 2024    Visits to Date: 1 No Show: 0 Cancelled Appts: 0    OBJECTIVE: Long Term Goals - Time Frame for Long Term Goals : 6-8 weeks  Goals Current/ Discharge status Status   Long Term Goal 1: Patient will be independent/compliant with PT HEP in order to demonstrate self management of symptoms upon D/C LTG 1 Current Status:: 24: on-going, initiated this date   New   Long Term Goal 2: The patient will demo improved pain free L shoulder AROM >/=150* flexion & scaption; T1 ER; T12 IR in order to increase ease with functional mobility tasks & ADL's  General AROM UE: AROM Assessed, Right WFL  AROM LUE (degrees)  L Shoulder Flexion (0-180): 30*  L Shoulder Extension (0-45): 50*  L Shoulder ABduction (0-180): 46*  L Shoulder Int Rotation  (0-70): PSIS  L Shoulder Ext Rotation  (0-90): DNT  L Elbow Flexion (0-145): 145*  L Elbow Extension (145-0): 0*       General PROM UE: PROM Assessed, Right WFL  PROM LUE (degrees)  L Shoulder Flex  (0-180): 71*  L Shoulder ABduction (0-180): 70*  L Shoulder Int Rotation  (0-70): 10-15*  L Shoulder Ext Rotation  (0-90): 10-15*  L Elbow Flex/Ext (0-145): 0-145*     New   Long Term Goal 3: The patient will

## 2024-04-30 ENCOUNTER — HOSPITAL ENCOUNTER (OUTPATIENT)
Dept: PHYSICAL THERAPY | Age: 79
Setting detail: THERAPIES SERIES
Discharge: HOME | End: 2024-04-30
Payer: MEDICARE

## 2024-04-30 PROCEDURE — 97110 THERAPEUTIC EXERCISES: CPT

## 2024-04-30 NOTE — PROGRESS NOTES
re-education, Endurance training, Equipment evaluation, education, & procurement, Modalities, Positioning  Modalities: Vasopneumatic Device, Heat/Cold  Pt to continue current HEP.  See objective section for any therapeutic exercise changes, additions or modifications this date.    Therapy Time:      PT Individual Minutes  Time In: 1330  Time Out: 1408  Minutes: 38  Timed Code Treatment Minutes: 38 Minutes  Procedure Minutes:0  Timed Activity Minutes Units   Ther Ex 38 3     Electronically signed by Michael Melo PTA on 4/30/24 at 2:13 PM EDT

## 2024-05-02 ENCOUNTER — HOSPITAL ENCOUNTER (OUTPATIENT)
Dept: PHYSICAL THERAPY | Age: 79
Setting detail: THERAPIES SERIES
Discharge: HOME | End: 2024-05-02
Payer: MEDICARE

## 2024-05-02 PROCEDURE — 97110 THERAPEUTIC EXERCISES: CPT

## 2024-05-02 NOTE — PROGRESS NOTES
pain  Assessment: Continued to progress patient through AAROM/PROM activity with good tolerance. Occassional reports of decreased pain, noting mostly tightness and stretching.  Treatment Diagnosis: decreased ability to perform functional mobility tasks & ADLs, decreased left UE ROM & strength, & increased left UE pain  Therapy Prognosis: Good       Post-Pain Assessment:       Pain Rating (0-10 pain scale):  0 /10   Location and pain description same as pre-treatment unless indicated.   Action: [x] NA   [] Perform HEP  [] Meds as prescribed  [] Modalities as prescribed   [] Call Physician     GOALS   Patient Goal(s): Patient Goals : 'arm as normal as possible'    Long Term Goals Completed by 6-8 weeks Goal Status   LTG 1 Patient will be independent/compliant with PT HEP in order to demonstrate self management of symptoms upon D/C In progress   LTG 2 The patient will demo improved pain free L shoulder AROM >/=150* flexion & scaption; T1 ER; T12 IR in order to increase ease with functional mobility tasks & ADL's In progress   LTG 3 The patient will demonstrate improved L UE strength >/= 4/5 in order to increase ease with functional mobility tasks & ADL's In progress   LTG 4 The patient will have an increase in UEFI score >/=9 points in order to increase functional activity tolerance. In progress          Plan:  Frequency/Duration:  Plan  Plan Frequency: 2xs/wk  Plan weeks: 6-8 weeks  Current Treatment Recommendations: Strengthening, ROM, Functional mobility training, ADL/Self-care training, IADL training, Manual, Safety education & training, Patient/Caregiver education & training, Home exercise program, Therapeutic activities, Pain management, Group Therapy, Neuromuscular re-education, Endurance training, Equipment evaluation, education, & procurement, Modalities, Positioning  Modalities: Vasopneumatic Device, Heat/Cold  Pt to continue current HEP.  See objective section for any therapeutic exercise changes, additions

## 2024-05-07 ENCOUNTER — OFFICE VISIT (OUTPATIENT)
Dept: ORTHOPEDIC SURGERY | Age: 79
End: 2024-05-07

## 2024-05-07 ENCOUNTER — HOSPITAL ENCOUNTER (OUTPATIENT)
Dept: PHYSICAL THERAPY | Age: 79
Setting detail: THERAPIES SERIES
Discharge: HOME | End: 2024-05-07
Payer: MEDICARE

## 2024-05-07 VITALS
BODY MASS INDEX: 24.54 KG/M2 | HEART RATE: 51 BPM | WEIGHT: 125 LBS | TEMPERATURE: 97.4 F | OXYGEN SATURATION: 97 % | HEIGHT: 60 IN

## 2024-05-07 DIAGNOSIS — S42.292D FRACTURE OF HUMERAL HEAD, LEFT, CLOSED, WITH ROUTINE HEALING, SUBSEQUENT ENCOUNTER: Primary | ICD-10-CM

## 2024-05-07 PROCEDURE — 99024 POSTOP FOLLOW-UP VISIT: CPT | Performed by: PHYSICIAN ASSISTANT

## 2024-05-07 PROCEDURE — 97110 THERAPEUTIC EXERCISES: CPT

## 2024-05-07 ASSESSMENT — ENCOUNTER SYMPTOMS
EYES NEGATIVE: 1
RESPIRATORY NEGATIVE: 1
GASTROINTESTINAL NEGATIVE: 1

## 2024-05-07 NOTE — PROGRESS NOTES
strengthening activities as tolerated.  Current Treatment Recommendations: Strengthening, ROM, Functional mobility training, ADL/Self-care training, IADL training, Manual, Safety education & training, Patient/Caregiver education & training, Home exercise program, Therapeutic activities, Pain management, Group Therapy, Neuromuscular re-education, Endurance training, Equipment evaluation, education, & procurement, Modalities, Positioning  Modalities: Vasopneumatic Device, Heat/Cold  Additional Comments: Transfer POC to Victoria Medrano DPT  Pt to continue current HEP.  See objective section for any therapeutic exercise changes, additions or modifications this date.    Therapy Time:      PT Individual Minutes  Time In: 1244  Time Out: 1324  Minutes: 40  Timed Code Treatment Minutes: 40 Minutes  Procedure Minutes:0  Timed Activity Minutes Units   Ther Ex 40 3     Electronically signed by Michael Melo PTA on 5/7/24 at 1:27 PM EDT

## 2024-05-07 NOTE — PROGRESS NOTES
Terri Sanchez (:  1945) is a 78 y.o. female,Established patient, here for evaluation of the following chief complaint(s):  Fracture (Patient presents for follow up of left humerus fracture. DOI: 3-31-24//No pain currently. 4/10 at its worst. )      Assessment & Plan   1. Fracture of humeral head, left, closed, with routine healing, subsequent encounter  -     XR SHOULDER LEFT (MIN 2 VIEWS); Future      No follow-ups on file.       Subjective   This an 78-year-old right-hand-dominant female following up for left proximal humerus fracture sustained 2024.  Patient has been improving slowly.  She is attending physical therapy and they are working on range of motion exercises.  She states she has been coming out of her sling on her own.  She denies change in sensation of the left arm.        Review of Systems   Constitutional: Negative.    HENT: Negative.     Eyes: Negative.    Respiratory: Negative.     Gastrointestinal: Negative.    Genitourinary: Negative.    Musculoskeletal: Negative.    Psychiatric/Behavioral: Negative.            Objective   Radiographs left shoulder, 3 views, healing fracture through the humeral head with more than 2 fragments.  No change in alignment since previous x-rays performed 2024    Physical Exam  Musculoskeletal:      Comments: Left shoulder-no swelling or ecchymosis.  No acromioclavicular, clavicle, SC joint tenderness with palpation.  Internal rotation is 0 degrees, external rotation is to 100 degrees.  Abduction strength is intact but painful.  Supraspinatus test is negative.  Biceps contour is intact.  Proximal humerus is nontender with palpation.  Sensation is intact distally to light touch.  Capillary refills brisk.     Reviewed the x-rays with the patient and her .  She may progress to strengthening exercises.  She may now abduct away from her body with up to 10 pounds for the next couple weeks and then may increase.  Like to see her back

## 2024-05-09 ENCOUNTER — HOSPITAL ENCOUNTER (OUTPATIENT)
Dept: PHYSICAL THERAPY | Age: 79
Setting detail: THERAPIES SERIES
Discharge: HOME | End: 2024-05-09
Payer: MEDICARE

## 2024-05-09 PROCEDURE — 97110 THERAPEUTIC EXERCISES: CPT

## 2024-05-09 NOTE — PROGRESS NOTES
Decreased strength, Increased pain  Assessment: Continued introducing gentle strengthening and ROM exercises in different planes to facilitate AROM and stengthen improvements. Pt has good tolerance with gentle strengthening activity and AROM in supine position. Moderate compensations still present when elevating L UE; though displayed slight improvement with tactile and verbal cues.  Treatment Diagnosis: decreased ability to perform functional mobility tasks & ADLs, decreased left UE ROM & strength, & increased left UE pain  Therapy Prognosis: Good       Patient Education: [x] NA       Post-Pain Assessment:       Pain Rating (0-10 pain scale): 0  /10   Location and pain description same as pre-treatment unless indicated.   Action: [x] NA   [] Perform HEP  [] Meds as prescribed  [] Modalities as prescribed   [] Call Physician     GOALS   Patient Goal(s): Patient Goals : 'arm as normal as possible'    Long Term Goals Completed by 6-8 weeks Goal Status   LTG 1 Patient will be independent/compliant with PT HEP in order to demonstrate self management of symptoms upon D/C In progress   LTG 2 The patient will demo improved pain free L shoulder AROM >/=150* flexion & scaption; T1 ER; T12 IR in order to increase ease with functional mobility tasks & ADL's In progress   LTG 3 The patient will demonstrate improved L UE strength >/= 4/5 in order to increase ease with functional mobility tasks & ADL's In progress   LTG 4 The patient will have an increase in UEFI score >/=9 points in order to increase functional activity tolerance. In progress          Plan:  Frequency/Duration:  Plan  Plan Frequency: 2xs/wk  Plan weeks: 6-8 weeks  Current Treatment Recommendations: Strengthening, ROM, Functional mobility training, ADL/Self-care training, IADL training, Manual, Safety education & training, Patient/Caregiver education & training, Home exercise program, Therapeutic activities, Pain management, Group Therapy, Neuromuscular

## 2024-05-14 ENCOUNTER — HOSPITAL ENCOUNTER (OUTPATIENT)
Dept: PHYSICAL THERAPY | Age: 79
Setting detail: THERAPIES SERIES
Discharge: HOME | End: 2024-05-14
Payer: MEDICARE

## 2024-05-14 PROCEDURE — 97110 THERAPEUTIC EXERCISES: CPT

## 2024-05-14 ASSESSMENT — PAIN SCALES - GENERAL: PAINLEVEL_OUTOF10: 2

## 2024-05-14 ASSESSMENT — PAIN DESCRIPTION - ORIENTATION: ORIENTATION: LEFT

## 2024-05-14 ASSESSMENT — PAIN DESCRIPTION - PAIN TYPE: TYPE: ACUTE PAIN

## 2024-05-14 ASSESSMENT — PAIN DESCRIPTION - LOCATION: LOCATION: SHOULDER

## 2024-05-14 ASSESSMENT — PAIN DESCRIPTION - DESCRIPTORS: DESCRIPTORS: ACHING;DULL

## 2024-05-14 NOTE — PROGRESS NOTES
[address] [place] [address]  Phone: [phone]      Date: 2024  Patient: Terri Sanchez  : 1945   Confirmed: Yes  MRN: [id number]  Referring Provider: Huang Smith PA    Medical Diagnosis: Humeral head fracture, left, closed, initial encounter [D73.284B]       Treatment Diagnosis: decreased ability to perform functional mobility tasks & ADLs, decreased left UE ROM & strength, & increased left UE pain    Visit Information:  Insurance: Payor: [employer] / Plan: [employer] PREFERRED PPO / Product Type: *No Product type* /   PT Visit Information  Onset Date: 24  PT Insurance Information: [employer] Advantage  Total # of Visits Approved: 99  Total # of Visits to Date: 6  Plan of Care/Certification Expiration Date: 24  No Show: 0  Progress Note Due Date: 24  Canceled Appointment: 0  Progress Note Counter: - (30 day PN due 24)    Subjective Information:  Subjective: I see improvement but I was pretty sore after last visit.  HEP Compliance:  [x] Good [] Fair [] Poor [] Reports not doing due to:    Pain Screening  Patient Currently in Pain: Yes  Pain Assessment: 0-10  Pain Level: 2  Pain Type: Acute pain  Pain Location: Shoulder  Pain Orientation: Left  Pain Descriptors: Aching, Dull    Treatment:  Exercises:  Exercises  Exercise 1: Weight restriction 10# currently.  Exercise 2: pulleys: flex 3'  Exercise 3: AAROM: Cane Flex, ABD, ER 5\" x 10 ea. Seated position  Exercise 4: Supine Chest press 2# wand x 10  / Flex- with cane x 10 ea.  Exercise 5: PROM x 8 minutes  Exercise 7: Tband Rows/Lat Pulls 2 x 10 ea YTB  Exercise 8: 3-way Bicep curls 1# x 10 ea.  Exercise 9: SL ER 2x 10,  ABD 2x10  Exercise 10: reactive isometrics ER/IR YTB light tension single step away x 10  Exercise 20: HEP: cont. with current          *Indicates exercise, modality, or manual techniques to be initiated when

## 2024-05-15 ENCOUNTER — OFFICE VISIT (OUTPATIENT)
Dept: PRIMARY CARE | Facility: CLINIC | Age: 79
End: 2024-05-15
Payer: MEDICARE

## 2024-05-15 VITALS
HEART RATE: 57 BPM | HEIGHT: 60 IN | TEMPERATURE: 97.2 F | SYSTOLIC BLOOD PRESSURE: 134 MMHG | WEIGHT: 125 LBS | DIASTOLIC BLOOD PRESSURE: 75 MMHG | BODY MASS INDEX: 24.54 KG/M2

## 2024-05-15 DIAGNOSIS — E53.8 VITAMIN B12 DEFICIENCY: ICD-10-CM

## 2024-05-15 DIAGNOSIS — I10 PRIMARY HYPERTENSION: ICD-10-CM

## 2024-05-15 DIAGNOSIS — Z78.0 MENOPAUSE: ICD-10-CM

## 2024-05-15 DIAGNOSIS — E78.5 DYSLIPIDEMIA: ICD-10-CM

## 2024-05-15 DIAGNOSIS — M85.80 OSTEOPENIA, UNSPECIFIED LOCATION: ICD-10-CM

## 2024-05-15 DIAGNOSIS — Z78.9 NEVER SMOKED CIGARETTES: ICD-10-CM

## 2024-05-15 PROCEDURE — 3075F SYST BP GE 130 - 139MM HG: CPT | Performed by: FAMILY MEDICINE

## 2024-05-15 PROCEDURE — 1036F TOBACCO NON-USER: CPT | Performed by: FAMILY MEDICINE

## 2024-05-15 PROCEDURE — 1159F MED LIST DOCD IN RCRD: CPT | Performed by: FAMILY MEDICINE

## 2024-05-15 PROCEDURE — 3078F DIAST BP <80 MM HG: CPT | Performed by: FAMILY MEDICINE

## 2024-05-15 PROCEDURE — 1160F RVW MEDS BY RX/DR IN RCRD: CPT | Performed by: FAMILY MEDICINE

## 2024-05-15 PROCEDURE — 99213 OFFICE O/P EST LOW 20 MIN: CPT | Performed by: FAMILY MEDICINE

## 2024-05-15 RX ORDER — LANOLIN ALCOHOL/MO/W.PET/CERES
1000 CREAM (GRAM) TOPICAL DAILY
COMMUNITY

## 2024-05-15 ASSESSMENT — ENCOUNTER SYMPTOMS
CARDIOVASCULAR NEGATIVE: 1
GASTROINTESTINAL NEGATIVE: 1
ENDOCRINE NEGATIVE: 1
NEUROLOGICAL NEGATIVE: 1
PSYCHIATRIC NEGATIVE: 1
HEMATOLOGIC/LYMPHATIC NEGATIVE: 1
ARTHRALGIAS: 1
ALLERGIC/IMMUNOLOGIC NEGATIVE: 1
CONSTITUTIONAL NEGATIVE: 1
EYES NEGATIVE: 1
RESPIRATORY NEGATIVE: 1

## 2024-05-15 ASSESSMENT — PATIENT HEALTH QUESTIONNAIRE - PHQ9
2. FEELING DOWN, DEPRESSED OR HOPELESS: NOT AT ALL
SUM OF ALL RESPONSES TO PHQ9 QUESTIONS 1 AND 2: 0
1. LITTLE INTEREST OR PLEASURE IN DOING THINGS: NOT AT ALL

## 2024-05-15 NOTE — PROGRESS NOTES
Subjective Shabana is here for a follow-up on hypertension and hyperlipidemia.  He overall has been doing well.  She fell 1 month ago and fractured her left proximal humerus which is now healing under the care of orthopedics.  She is in physical therapy.  She does take calcium supplement but does not know how much.  She has not had a bone density in several years.  She has no other complaints at the present time.  She continues on her medications as noted.    Patient ID: Shabana Adan is a 78 y.o. female who presents for Follow-up:    Problem List Items Addressed This Visit    None     Past Medical History:   Diagnosis Date    Body mass index (BMI) 24.0-24.9, adult 05/09/2022    Body mass index (BMI) of 24.0 to 24.9 in adult    Body mass index (BMI) 24.0-24.9, adult 11/08/2021    BMI 24.0-24.9, adult    Disorder of pigmentation, unspecified 11/05/2021    Discoloration of skin of face    Personal history of diseases of the blood and blood-forming organs and certain disorders involving the immune mechanism 11/05/2021    History of anemia    Personal history of other benign neoplasm 05/09/2022    History of other benign neoplasm      Past Surgical History:   Procedure Laterality Date    OTHER SURGICAL HISTORY  11/05/2021    Lumpectomy    OTHER SURGICAL HISTORY  11/05/2021    Hysterectomy    OTHER SURGICAL HISTORY  11/05/2021    Wrist surgery    OTHER SURGICAL HISTORY  11/05/2021    Cataract surgery    OTHER SURGICAL HISTORY  11/05/2021    Leg surgery    OTHER SURGICAL HISTORY  11/08/2021    Colonoscopy    XR CHEST PACEMAKER WITH FLUORO  6/24/2019    XR CHEST PACEMAKER WITH FLUORO 6/24/2019 [facility] CL[facility]ICAL LEGAC[facility]      Family History   Problem Relation Name Age of Onset    COPD Brother      Cancer Brother        Social History     Socioeconomic History    Marital status:      Spouse name: Not on file    Number of children: Not on file    Years of education: Not on file    Highest education level: Not on file    Occupational History    Not on file   Tobacco Use    Smoking status: Never    Smokeless tobacco: Never   Substance and Sexual Activity    Alcohol use: Never    Drug use: Never    Sexual activity: Not on file   Other Topics Concern    Not on file   Social History Narrative    Not on file     Social Determinants of Health     Financial Resource Strain: Low Risk  (7/25/2021)    [facility]    Overall Financial Resource Strain (CARDIA)     Difficulty of Paying Living Expenses: Not very hard   Food Insecurity: No Food Insecurity (7/25/2021)    [facility]    Hunger Vital Sign     Worried About Running Out of Food in the Last Year: Never true     Ran Out of Food in the Last Year: Never true   Transportation Needs: No Transportation Needs (7/25/2021)    [facility]    PRAPARE - Transportation     Lack of Transportation (Medical): No     Lack of Transportation (Non-Medical): No   Physical Activity: Not on file   Stress: Not on file   Social Connections: Not on file   Intimate Partner Violence: Not on file   Housing Stability: Not on file      Codeine, Hydrocodone-acetaminophen, Metronidazole, Sulfamethoxazole-trimethoprim, Ibuprofen, and Latex   Current Outpatient Medications   Medication Sig Dispense Refill    aspirin 81 mg EC tablet Take 1 tablet (81 mg) by mouth once daily.      calcium carbonate-vitamin D3 600 mg-20 mcg (800 unit) tablet Take 1 tablet by mouth once daily.      cholecalciferol (Vitamin D-3) 50 mcg (2,000 unit) capsule Take 1 capsule (50 mcg) by mouth early in the morning..      cyanocobalamin (Vitamin B-12) 1,000 mcg tablet Take 1 tablet (1,000 mcg) by mouth once daily.      DILT- mg 24 hr capsule TAKE ONE CAPSULE BY MOUTH EVERY DAY 90 capsule 1    hydroCHLOROthiazide (Microzide) 12.5 mg capsule TAKE ONE CAPSULE BY MOUTH EVERY MORNING 90 capsule 1    methenamine hippurate (Hiprex) 1 gram  tablet Take 0.5 tablets (0.5 g) by mouth once daily.      metoprolol succinate XL (Toprol-XL) 100 mg 24 hr tablet Take 1 tablet (100 mg) by mouth once daily. 90 tablet 1    potassium chloride CR 10 mEq ER tablet TAKE ONE TABLET BY MOUTH TWO TIMES A  tablet 1    pravastatin (Pravachol) 40 mg tablet TAKE ONE TABLET BY MOUTH AT BEDTIME 90 tablet 1     No current facility-administered medications for this visit.       Immunization History   Administered Date(s) Administered    Flu vaccine (IIV4), preservative free *Check age/dose* 09/26/2018    Flu vaccine, quadrivalent, high-dose, preservative free, age 65y+ (FLUZONE) 10/13/2020    Influenza, High Dose Seasonal, Preservative Free 10/29/2015, 11/17/2017, 09/24/2019, 10/13/2020    Influenza, Seasonal, Quadrivalent, Adjuvanted 10/13/2021, 10/17/2022, 10/23/2023    Influenza, Unspecified 10/01/2015, 09/27/2016, 10/01/2022    Moderna COVID-19 vaccine, Fall 2023, 12 yeasrs and older (50mcg/0.5mL) 11/21/2023    Moderna COVID-19 vaccine, bivalent, blue cap/gray label *Check age/dose* 10/29/2022    Moderna SARS-CoV-2 Vaccination 03/04/2021, 04/01/2021, 12/18/2021, 06/03/2022    Pneumococcal conjugate vaccine, 13-valent (PREVNAR 13) 07/14/2017        Review of Systems   Constitutional: Negative.    HENT: Negative.     Eyes: Negative.    Respiratory: Negative.     Cardiovascular: Negative.    Gastrointestinal: Negative.    Endocrine: Negative.    Genitourinary: Negative.    Musculoskeletal:  Positive for arthralgias.   Skin: Negative.    Allergic/Immunologic: Negative.    Neurological: Negative.    Hematological: Negative.    Psychiatric/Behavioral: Negative.     All other systems reviewed and are negative.       Vitals:    05/15/24 0915   BP: 134/75   Pulse: 57   Temp: 36.2 °C (97.2 °F)     Vitals:    05/15/24 0915   Weight: 56.7 kg (125 lb)      Physical Exam  Constitutional:       General: She is not in acute distress.     Appearance: Normal appearance.   Neck:       Vascular: No carotid bruit.   Cardiovascular:      Rate and Rhythm: Normal rate and regular rhythm.      Pulses: Normal pulses.      Heart sounds: Normal heart sounds. No murmur heard.     No friction rub. No gallop.   Pulmonary:      Effort: Pulmonary effort is normal. No respiratory distress.      Breath sounds: Normal breath sounds. No wheezing or rales.   Musculoskeletal:      Cervical back: Neck supple.   Neurological:      Mental Status: She is alert.          ASSESSMENT/PLAN: Hypertension stable.  Continue current meds noted    Hyperlipidemia stable.  Labs are up-to-date.  Check CMP and lipid profile in November 2024.  Continue pravastatin daily.    Status post left proximal humeral fracture improving.  Follow-up with physical therapy and orthopedics.  Obtain bone density.  Recommended calcium intake 1200 to 1500 mg daily.    Vitamin B12 deficiency.  Check vitamin B12 level and CBC    We discussed the importance of fall prevention.  Exercise regularly  Recommended colonoscopy and mammograms which patient refuses.  Recommended shingles vaccination which patient will consider  Follow-up 6 months and call as needed             Scribe Attestation  By signing my name below, I, Sara Richter LPN, Scribe   attest that this documentation has been prepared under the direction and in the presence of Hernan Osborne MD.

## 2024-05-16 ENCOUNTER — HOSPITAL ENCOUNTER (OUTPATIENT)
Dept: PHYSICAL THERAPY | Age: 79
Setting detail: THERAPIES SERIES
Discharge: HOME | End: 2024-05-16
Payer: MEDICARE

## 2024-05-16 PROCEDURE — 97110 THERAPEUTIC EXERCISES: CPT

## 2024-05-16 ASSESSMENT — PAIN DESCRIPTION - LOCATION: LOCATION: SHOULDER

## 2024-05-16 ASSESSMENT — PAIN DESCRIPTION - DESCRIPTORS: DESCRIPTORS: ACHING;DULL

## 2024-05-16 ASSESSMENT — PAIN SCALES - GENERAL: PAINLEVEL_OUTOF10: 1

## 2024-05-16 ASSESSMENT — PAIN DESCRIPTION - PAIN TYPE: TYPE: ACUTE PAIN

## 2024-05-16 ASSESSMENT — PAIN DESCRIPTION - ORIENTATION: ORIENTATION: LEFT

## 2024-05-16 NOTE — PROGRESS NOTES
[address] [place] [address]  Phone: [phone]      Date: 2024  Patient: Terri Sanchez  : 1945   Confirmed: Yes  MRN: [id number]  Referring Provider: Huang Smith PA    Medical Diagnosis: Humeral head fracture, left, closed, initial encounter [M50.097P]       Treatment Diagnosis: decreased ability to perform functional mobility tasks & ADLs, decreased left UE ROM & strength, & increased left UE pain    Visit Information:  Insurance: Payor: [employer] ADVANTAGE / Plan: [employer] PREFERRED PPO / Product Type: *No Product type* /   PT Visit Information  Onset Date: 24  PT Insurance Information: Medical [place] Medicare Advantage  Total # of Visits Approved: 99  Total # of Visits to Date: 7  Plan of Care/Certification Expiration Date: 24  No Show: 0  Progress Note Due Date: 24  Canceled Appointment: 0  Progress Note Counter: - (30 day PN due 24)    Subjective Information:  Subjective: Pt reports \"an achiness\" in the L shoulder this date. Reports she feels it from the L UE, intermittently down to the L hand.  HEP Compliance:  [x] Good [] Fair [] Poor [] Reports not doing due to:    Pain Screening  Patient Currently in Pain: Yes  Pain Assessment: 0-10  Pain Level: 1  Pain Type: Acute pain  Pain Location: Shoulder  Pain Orientation: Left  Pain Descriptors: Aching, Dull    Treatment:  Exercises:  Exercises  Exercise 1: Weight restriction 10# currently.  Exercise 2: pulleys: flex 3'  Exercise 3: Rail Slides  x 15 -5\"  Exercise 4: Supine Chest press 2  x 10  / Flex- with cane 2 x 10 ea.  Exercise 5: PROM x 10 minutes  Exercise 7: Tband Rows/Lat Pulls  x 10 ea YTB  Exercise 8: 3-way Bicep curls- D/c to HEP  Exercise 9: SL ER 2x 10,  ABD 2x10 - D/C to HEP  Exercise 20: HEP: tband rows/lat pulls     Objective Measures:            LTG 1 Current Status:: 24:Performs exercises frequently at home (2-3 x

## 2024-05-21 ENCOUNTER — HOSPITAL ENCOUNTER (OUTPATIENT)
Dept: PHYSICAL THERAPY | Age: 79
Setting detail: THERAPIES SERIES
Discharge: HOME | End: 2024-05-21
Payer: MEDICARE

## 2024-05-21 PROCEDURE — 97110 THERAPEUTIC EXERCISES: CPT

## 2024-05-21 NOTE — PROGRESS NOTES
[facility]  [address] [place] [address]  Phone: [phone]      Date: 2024  Patient: Terri Sanchez  : 1945   Confirmed: Yes  MRN: [id number]  Referring Provider: Huang Smith PA    Medical Diagnosis: Humeral head fracture, left, closed, initial encounter [U88.083I]       Treatment Diagnosis: decreased ability to perform functional mobility tasks & ADLs, decreased left UE ROM & strength, & increased left UE pain    Visit Information:  Insurance: Payor: [employer] ADVANTAGE / Plan: [employer] ADVANT[employer] PREFERRED PPO / Product Type: *No Product type* /   PT Visit Information  Onset Date: 24  PT Insurance Information: Medical [place] Medicare Advantage  Total # of Visits Approved: 99  Total # of Visits to Date: 8  Plan of Care/Certification Expiration Date: 24  No Show: 0  Progress Note Due Date: 24  Canceled Appointment: 0  Progress Note Counter: - (30 day PN due 24)    Subjective Information:  Subjective: Things have been terrific. I have been doing more and more without even having to think about it.  HEP Compliance:  [x] Good [] Fair [] Poor [] Reports not doing due to:    Pain Screening  Patient Currently in Pain: Denies    Treatment:  Exercises:  Exercises  Exercise 1: Weight restriction 10# currently.  Exercise 2: pulleys: flex 3'  Exercise 3: Rail Slides  x 15 -5\", Flexion/Scaption  Exercise 4: Supine Chest press with serratus punch 3  x 10  / Flex- with cane 2 x 10 ea. ( CP 1 set completed with #2 dowel)  Exercise 5: PROM x 10 minutes  Exercise 7: Tband Rows/Lat Pulls  x 10 ea YTB  Exercise 8: ER/IR isometrics in doorway 20 x 5\"  Exercise 20: HEP: tband rows/lat pulls       *Indicates exercise, modality, or manual techniques to be initiated when appropriate    Objective Measures:     Assessment:   Body Structures, Functions, Activity Limitations Requiring Skilled Therapeutic Intervention: Decreased functional mobility

## 2024-05-22 ENCOUNTER — APPOINTMENT (OUTPATIENT)
Dept: GENERAL RADIOLOGY | Age: 79
DRG: 481 | End: 2024-05-22
Payer: MEDICARE

## 2024-05-22 ENCOUNTER — HOSPITAL ENCOUNTER (INPATIENT)
Age: 79
LOS: 7 days | Discharge: NURSING, REHAB OR LONG-TERM CARE | DRG: 481 | End: 2024-05-29
Attending: SURGERY | Admitting: SURGERY
Payer: MEDICARE

## 2024-05-22 DIAGNOSIS — G89.11 ACUTE PAIN DUE TO TRAUMA: ICD-10-CM

## 2024-05-22 DIAGNOSIS — G89.18 ACUTE POST-OPERATIVE PAIN: ICD-10-CM

## 2024-05-22 DIAGNOSIS — S72.342A CLOSED DISPLACED SPIRAL FRACTURE OF SHAFT OF LEFT FEMUR, INITIAL ENCOUNTER (HCC): Primary | ICD-10-CM

## 2024-05-22 LAB
ALBUMIN SERPL-MCNC: 4 G/DL (ref 3.5–4.6)
ALP SERPL-CCNC: 90 U/L (ref 40–130)
ALT SERPL-CCNC: 17 U/L (ref 0–33)
ANION GAP SERPL CALCULATED.3IONS-SCNC: 12 MEQ/L (ref 9–15)
APTT PPP: 23.7 SEC (ref 24.4–36.8)
AST SERPL-CCNC: 16 U/L (ref 0–35)
BASOPHILS # BLD: 0.1 K/UL (ref 0–0.2)
BASOPHILS NFR BLD: 0.3 %
BILIRUB SERPL-MCNC: 0.9 MG/DL (ref 0.2–0.7)
BUN SERPL-MCNC: 18 MG/DL (ref 8–23)
CALCIUM SERPL-MCNC: 9.3 MG/DL (ref 8.5–9.9)
CHLORIDE SERPL-SCNC: 101 MEQ/L (ref 95–107)
CO2 SERPL-SCNC: 26 MEQ/L (ref 20–31)
CREAT SERPL-MCNC: 0.65 MG/DL (ref 0.5–0.9)
EOSINOPHIL # BLD: 0.1 K/UL (ref 0–0.7)
EOSINOPHIL NFR BLD: 0.7 %
ERYTHROCYTE [DISTWIDTH] IN BLOOD BY AUTOMATED COUNT: 12.9 % (ref 11.5–14.5)
ETHANOL PERCENT: NORMAL G/DL
ETHANOLAMINE SERPL-MCNC: <10 MG/DL (ref 0–0.08)
GLOBULIN SER CALC-MCNC: 2.6 G/DL (ref 2.3–3.5)
GLUCOSE SERPL-MCNC: 127 MG/DL (ref 70–99)
HCT VFR BLD AUTO: 39.3 % (ref 37–47)
HGB BLD-MCNC: 13.1 G/DL (ref 12–16)
INR PPP: 1
LYMPHOCYTES # BLD: 1.3 K/UL (ref 1–4.8)
LYMPHOCYTES NFR BLD: 8.1 %
MCH RBC QN AUTO: 30.9 PG (ref 27–31.3)
MCHC RBC AUTO-ENTMCNC: 33.3 % (ref 33–37)
MCV RBC AUTO: 92.7 FL (ref 79.4–94.8)
MONOCYTES # BLD: 0.9 K/UL (ref 0.2–0.8)
MONOCYTES NFR BLD: 5.6 %
NEUTROPHILS # BLD: 13.2 K/UL (ref 1.4–6.5)
NEUTS SEG NFR BLD: 84.8 %
PLATELET # BLD AUTO: 238 K/UL (ref 130–400)
POTASSIUM SERPL-SCNC: 3.6 MEQ/L (ref 3.4–4.9)
PROT SERPL-MCNC: 6.6 G/DL (ref 6.3–8)
PROTHROMBIN TIME: 13 SEC (ref 12.3–14.9)
RBC # BLD AUTO: 4.24 M/UL (ref 4.2–5.4)
SODIUM SERPL-SCNC: 139 MEQ/L (ref 135–144)
WBC # BLD AUTO: 15.5 K/UL (ref 4.8–10.8)

## 2024-05-22 PROCEDURE — 85610 PROTHROMBIN TIME: CPT

## 2024-05-22 PROCEDURE — 2580000003 HC RX 258: Performed by: SURGERY

## 2024-05-22 PROCEDURE — 82077 ASSAY SPEC XCP UR&BREATH IA: CPT

## 2024-05-22 PROCEDURE — 73502 X-RAY EXAM HIP UNI 2-3 VIEWS: CPT

## 2024-05-22 PROCEDURE — 99221 1ST HOSP IP/OBS SF/LOW 40: CPT | Performed by: SURGERY

## 2024-05-22 PROCEDURE — 73552 X-RAY EXAM OF FEMUR 2/>: CPT

## 2024-05-22 PROCEDURE — 1210000000 HC MED SURG R&B

## 2024-05-22 PROCEDURE — 36415 COLL VENOUS BLD VENIPUNCTURE: CPT

## 2024-05-22 PROCEDURE — 27500 TREATMENT OF THIGH FRACTURE: CPT | Performed by: ORTHOPAEDIC SURGERY

## 2024-05-22 PROCEDURE — 6360000002 HC RX W HCPCS

## 2024-05-22 PROCEDURE — 80053 COMPREHEN METABOLIC PANEL: CPT

## 2024-05-22 PROCEDURE — 99285 EMERGENCY DEPT VISIT HI MDM: CPT

## 2024-05-22 PROCEDURE — 71045 X-RAY EXAM CHEST 1 VIEW: CPT

## 2024-05-22 PROCEDURE — 99222 1ST HOSP IP/OBS MODERATE 55: CPT | Performed by: ORTHOPAEDIC SURGERY

## 2024-05-22 PROCEDURE — 96374 THER/PROPH/DIAG INJ IV PUSH: CPT

## 2024-05-22 PROCEDURE — 85025 COMPLETE CBC W/AUTO DIFF WBC: CPT

## 2024-05-22 PROCEDURE — 85730 THROMBOPLASTIN TIME PARTIAL: CPT

## 2024-05-22 PROCEDURE — 6830039000 HC L3 TRAUMA ALERT

## 2024-05-22 RX ORDER — SODIUM CHLORIDE 9 MG/ML
INJECTION, SOLUTION INTRAVENOUS PRN
Status: DISCONTINUED | OUTPATIENT
Start: 2024-05-22 | End: 2024-05-29 | Stop reason: HOSPADM

## 2024-05-22 RX ORDER — SODIUM CHLORIDE 0.9 % (FLUSH) 0.9 %
5-40 SYRINGE (ML) INJECTION PRN
Status: DISCONTINUED | OUTPATIENT
Start: 2024-05-22 | End: 2024-05-29 | Stop reason: HOSPADM

## 2024-05-22 RX ORDER — ACETAMINOPHEN 325 MG/1
650 TABLET ORAL EVERY 4 HOURS PRN
Status: DISCONTINUED | OUTPATIENT
Start: 2024-05-22 | End: 2024-05-23

## 2024-05-22 RX ORDER — FENTANYL CITRATE 0.05 MG/ML
50 INJECTION, SOLUTION INTRAMUSCULAR; INTRAVENOUS
Status: DISCONTINUED | OUTPATIENT
Start: 2024-05-22 | End: 2024-05-22

## 2024-05-22 RX ORDER — ONDANSETRON 2 MG/ML
4 INJECTION INTRAMUSCULAR; INTRAVENOUS EVERY 6 HOURS PRN
Status: DISCONTINUED | OUTPATIENT
Start: 2024-05-22 | End: 2024-05-29 | Stop reason: HOSPADM

## 2024-05-22 RX ORDER — FENTANYL CITRATE 0.05 MG/ML
50 INJECTION, SOLUTION INTRAMUSCULAR; INTRAVENOUS ONCE
Status: COMPLETED | OUTPATIENT
Start: 2024-05-22 | End: 2024-05-22

## 2024-05-22 RX ORDER — ONDANSETRON 4 MG/1
4 TABLET, ORALLY DISINTEGRATING ORAL EVERY 8 HOURS PRN
Status: DISCONTINUED | OUTPATIENT
Start: 2024-05-22 | End: 2024-05-29 | Stop reason: HOSPADM

## 2024-05-22 RX ORDER — HYDROCODONE BITARTRATE AND ACETAMINOPHEN 5; 325 MG/1; MG/1
1 TABLET ORAL EVERY 6 HOURS PRN
Status: DISCONTINUED | OUTPATIENT
Start: 2024-05-22 | End: 2024-05-23

## 2024-05-22 RX ORDER — SODIUM CHLORIDE 0.9 % (FLUSH) 0.9 %
5-40 SYRINGE (ML) INJECTION EVERY 12 HOURS SCHEDULED
Status: DISCONTINUED | OUTPATIENT
Start: 2024-05-22 | End: 2024-05-29 | Stop reason: HOSPADM

## 2024-05-22 RX ADMIN — FENTANYL CITRATE 50 MCG: 0.05 INJECTION, SOLUTION INTRAMUSCULAR; INTRAVENOUS at 19:53

## 2024-05-22 RX ADMIN — Medication 10 ML: at 21:49

## 2024-05-22 ASSESSMENT — PAIN SCALES - GENERAL
PAINLEVEL_OUTOF10: 6
PAINLEVEL_OUTOF10: 1
PAINLEVEL_OUTOF10: 1
PAINLEVEL_OUTOF10: 3

## 2024-05-22 ASSESSMENT — PAIN DESCRIPTION - ORIENTATION
ORIENTATION: LEFT

## 2024-05-22 ASSESSMENT — PAIN - FUNCTIONAL ASSESSMENT: PAIN_FUNCTIONAL_ASSESSMENT: 0-10

## 2024-05-22 ASSESSMENT — LIFESTYLE VARIABLES
HOW OFTEN DO YOU HAVE A DRINK CONTAINING ALCOHOL: NEVER
HOW MANY STANDARD DRINKS CONTAINING ALCOHOL DO YOU HAVE ON A TYPICAL DAY: PATIENT DOES NOT DRINK

## 2024-05-22 ASSESSMENT — PAIN DESCRIPTION - LOCATION
LOCATION: LEG

## 2024-05-22 NOTE — ED TRIAGE NOTES
Pt fell at home in her bedroom (hard wood floors)  Pt hurt her left leg   Pt was given 50 fentanyl and 4 mg of zofran   Pt states her pain is a 1/10  Pt is afebrile   Pt's left leg is swollen above the knee   Pt denies hitting her head   Pt is A&Ox4

## 2024-05-22 NOTE — ED PROVIDER NOTES
Negative for chills and fever.   HENT:  Negative for congestion.    Eyes:  Negative for photophobia, pain and visual disturbance.   Respiratory:  Negative for cough and shortness of breath.    Cardiovascular:  Negative for chest pain.   Gastrointestinal:  Negative for abdominal pain, diarrhea, nausea and vomiting.   Genitourinary:  Negative for difficulty urinating.   Musculoskeletal:  Positive for arthralgias. Negative for back pain, gait problem, joint swelling, myalgias, neck pain and neck stiffness.        Left lateral knee and leg pain   Skin:  Negative for rash and wound.   Neurological: Negative.  Negative for headaches.   Psychiatric/Behavioral:  Negative for confusion.        Except as noted above the remainder of the review of systems was reviewed and negative.       PAST MEDICAL HISTORY     Past Medical History:   Diagnosis Date    Hyperlipidemia     meds > 5 yrs    Hypertension     meds > 5 yrs    Osteoarthritis     Prolapsed uterus          SURGICAL HISTORY       Past Surgical History:   Procedure Laterality Date    BREAST SURGERY Right     fibrocystic breast /Bx / benign    COLONOSCOPY      ENDOSCOPY, COLON, DIAGNOSTIC      EYE SURGERY      phaco with IOL OU.    FRACTURE SURGERY Right     due to fracture / had skeletal hardware    FL COLONOSCOPY W/BIOPSY SINGLE/MULTIPLE N/A 4/10/2018    COLONOSCOPY WITH BIOPSYS performed by Conrad Ward MD at [facility] [place]    FL DELIGATION URETER N/A 10/4/2018    USLS/ SSLS performed by Senia Antonio MD at [facility] OR    FL LAPS W/VAGINAL HYSTERECTOMY > 250 GRAMS N/A 10/4/2018    LAPARSCOPIC HYSTERECTOMY performed by Senia Antonio MD at [facility] OR    VAGINA SURGERY N/A 10/4/2018    A-P REPAIR WITH TOT OBTRYX and SLING , CYSTOSCOPY performed by Senia Antonio MD at [facility] OR         CURRENT MEDICATIONS       Previous Medications    ASPIRIN 81 MG TABLET    Take by mouth daily     CALCIUM CITRATE-VITAMIN D (CALCIUM CITRATE + D3 PO)    Take by mouth 2 times daily     CEPHALEXIN  (KEFLEX) 250 MG CAPSULE    Take 1 capsule by mouth every evening    CEPHALEXIN (KEFLEX) 500 MG CAPSULE    Take 1 capsule by mouth every evening    CHOLECALCIFEROL (VITAMIN D) 2000 UNITS CAPS CAPSULE    Take by mouth Daily with supper    DILTIAZEM (CARDIZEM CD) 240 MG ER CAPSULE    Take 1 capsule by mouth daily    HYDROCHLOROTHIAZIDE (MICROZIDE) 12.5 MG CAPSULE    TAKE ONE CAPSULE BY MOUTH EVERY DAY IN THE MORNING    METHENAMINE (HIPREX) 1 G TABLET    Take 0.5 tablets by mouth daily    METOPROLOL SUCCINATE (TOPROL XL) 100 MG EXTENDED RELEASE TABLET    TAKE ONE TABLET BY MOUTH IN THE MORNING    POTASSIUM CHLORIDE (KLOR-CON) 10 MEQ EXTENDED RELEASE TABLET    TAKE ONE TABLET BY MOUTH TWO TIMES A DAY    PRAVASTATIN (PRAVACHOL) 40 MG TABLET        VITAMIN B-12 (CYANOCOBALAMIN) 1000 MCG TABLET    Take 1 tablet by mouth daily       ALLERGIES     Latex, Bactrim [sulfamethoxazole-trimethoprim], Codeine, and Vicodin [hydrocodone-acetaminophen]    FAMILY HISTORY       Family History   Problem Relation Age of Onset    High Blood Pressure Mother     Other Father         DVTs 1960    Heart Attack Father     Asthma Brother     Cancer Brother         bladder cancer    COPD Brother     High Blood Pressure Son     Other Son         slow IQ    Seizures Son           SOCIAL HISTORY       Social History     Socioeconomic History    Marital status:      Spouse name: None    Number of children: None    Years of education: None    Highest education level: None   Tobacco Use    Smoking status: Never    Smokeless tobacco: Never   Substance and Sexual Activity    Alcohol use: Yes     Alcohol/week: 0.0 standard drinks of alcohol     Comment: rare social    Drug use: No     Social Determinants of Health     Financial Resource Strain: Low Risk  (7/25/2021)    Overall Financial Resource Strain (CARDIA)     Difficulty of Paying Living Expenses: Not very hard   Food Insecurity: No Food Insecurity (7/25/2021)    Hunger Vital Sign     Worried

## 2024-05-23 ENCOUNTER — APPOINTMENT (OUTPATIENT)
Dept: CT IMAGING | Age: 79
DRG: 481 | End: 2024-05-23
Payer: MEDICARE

## 2024-05-23 ENCOUNTER — ANESTHESIA EVENT (OUTPATIENT)
Dept: OPERATING ROOM | Age: 79
End: 2024-05-23
Payer: MEDICARE

## 2024-05-23 ENCOUNTER — HOSPITAL ENCOUNTER (OUTPATIENT)
Dept: PHYSICAL THERAPY | Age: 79
Setting detail: THERAPIES SERIES
Discharge: HOME | End: 2024-05-23
Payer: MEDICARE

## 2024-05-23 ENCOUNTER — APPOINTMENT (OUTPATIENT)
Dept: GENERAL RADIOLOGY | Age: 79
DRG: 481 | End: 2024-05-23
Payer: MEDICARE

## 2024-05-23 ENCOUNTER — ANESTHESIA (OUTPATIENT)
Dept: OPERATING ROOM | Age: 79
End: 2024-05-23
Payer: MEDICARE

## 2024-05-23 LAB
EKG ATRIAL RATE: 79 BPM
EKG P AXIS: 64 DEGREES
EKG P-R INTERVAL: 154 MS
EKG Q-T INTERVAL: 388 MS
EKG QRS DURATION: 86 MS
EKG QTC CALCULATION (BAZETT): 444 MS
EKG R AXIS: 14 DEGREES
EKG T AXIS: 60 DEGREES
EKG VENTRICULAR RATE: 79 BPM

## 2024-05-23 PROCEDURE — 6360000002 HC RX W HCPCS: Performed by: ANESTHESIOLOGY

## 2024-05-23 PROCEDURE — 1210000000 HC MED SURG R&B

## 2024-05-23 PROCEDURE — 7100000001 HC PACU RECOVERY - ADDTL 15 MIN: Performed by: STUDENT IN AN ORGANIZED HEALTH CARE EDUCATION/TRAINING PROGRAM

## 2024-05-23 PROCEDURE — 2580000003 HC RX 258: Performed by: STUDENT IN AN ORGANIZED HEALTH CARE EDUCATION/TRAINING PROGRAM

## 2024-05-23 PROCEDURE — 27507 TREATMENT OF THIGH FRACTURE: CPT | Performed by: STUDENT IN AN ORGANIZED HEALTH CARE EDUCATION/TRAINING PROGRAM

## 2024-05-23 PROCEDURE — 3600000014 HC SURGERY LEVEL 4 ADDTL 15MIN: Performed by: STUDENT IN AN ORGANIZED HEALTH CARE EDUCATION/TRAINING PROGRAM

## 2024-05-23 PROCEDURE — 6360000002 HC RX W HCPCS: Performed by: STUDENT IN AN ORGANIZED HEALTH CARE EDUCATION/TRAINING PROGRAM

## 2024-05-23 PROCEDURE — 6360000002 HC RX W HCPCS: Performed by: NURSE PRACTITIONER

## 2024-05-23 PROCEDURE — C1713 ANCHOR/SCREW BN/BN,TIS/BN: HCPCS | Performed by: STUDENT IN AN ORGANIZED HEALTH CARE EDUCATION/TRAINING PROGRAM

## 2024-05-23 PROCEDURE — 6370000000 HC RX 637 (ALT 250 FOR IP)

## 2024-05-23 PROCEDURE — 0QS904Z REPOSITION LEFT FEMORAL SHAFT WITH INTERNAL FIXATION DEVICE, OPEN APPROACH: ICD-10-PCS | Performed by: STUDENT IN AN ORGANIZED HEALTH CARE EDUCATION/TRAINING PROGRAM

## 2024-05-23 PROCEDURE — 2580000003 HC RX 258: Performed by: NURSE PRACTITIONER

## 2024-05-23 PROCEDURE — 2500000003 HC RX 250 WO HCPCS: Performed by: STUDENT IN AN ORGANIZED HEALTH CARE EDUCATION/TRAINING PROGRAM

## 2024-05-23 PROCEDURE — 6370000000 HC RX 637 (ALT 250 FOR IP): Performed by: SURGERY

## 2024-05-23 PROCEDURE — A4217 STERILE WATER/SALINE, 500 ML: HCPCS | Performed by: STUDENT IN AN ORGANIZED HEALTH CARE EDUCATION/TRAINING PROGRAM

## 2024-05-23 PROCEDURE — 73200 CT UPPER EXTREMITY W/O DYE: CPT

## 2024-05-23 PROCEDURE — 2720000010 HC SURG SUPPLY STERILE: Performed by: STUDENT IN AN ORGANIZED HEALTH CARE EDUCATION/TRAINING PROGRAM

## 2024-05-23 PROCEDURE — 2500000003 HC RX 250 WO HCPCS: Performed by: ANESTHESIOLOGY

## 2024-05-23 PROCEDURE — 99232 SBSQ HOSP IP/OBS MODERATE 35: CPT

## 2024-05-23 PROCEDURE — 3700000001 HC ADD 15 MINUTES (ANESTHESIA): Performed by: STUDENT IN AN ORGANIZED HEALTH CARE EDUCATION/TRAINING PROGRAM

## 2024-05-23 PROCEDURE — 3700000000 HC ANESTHESIA ATTENDED CARE: Performed by: STUDENT IN AN ORGANIZED HEALTH CARE EDUCATION/TRAINING PROGRAM

## 2024-05-23 PROCEDURE — 7100000000 HC PACU RECOVERY - FIRST 15 MIN: Performed by: STUDENT IN AN ORGANIZED HEALTH CARE EDUCATION/TRAINING PROGRAM

## 2024-05-23 PROCEDURE — C1776 JOINT DEVICE (IMPLANTABLE): HCPCS | Performed by: STUDENT IN AN ORGANIZED HEALTH CARE EDUCATION/TRAINING PROGRAM

## 2024-05-23 PROCEDURE — 2580000003 HC RX 258: Performed by: SURGERY

## 2024-05-23 PROCEDURE — 93005 ELECTROCARDIOGRAM TRACING: CPT

## 2024-05-23 PROCEDURE — 76376 3D RENDER W/INTRP POSTPROCES: CPT

## 2024-05-23 PROCEDURE — 3600000004 HC SURGERY LEVEL 4 BASE: Performed by: STUDENT IN AN ORGANIZED HEALTH CARE EDUCATION/TRAINING PROGRAM

## 2024-05-23 PROCEDURE — 2709999900 HC NON-CHARGEABLE SUPPLY: Performed by: STUDENT IN AN ORGANIZED HEALTH CARE EDUCATION/TRAINING PROGRAM

## 2024-05-23 DEVICE — SCREW BNE L55MM DIA5MM CNDYL S STL ST VAR ANG LOK FULL THRD: Type: IMPLANTABLE DEVICE | Site: FEMUR | Status: FUNCTIONAL

## 2024-05-23 DEVICE — SCREW BNE L60MM DIA5MM CNDYL S STL ST VAR ANG LOK FULL THRD: Type: IMPLANTABLE DEVICE | Site: FEMUR | Status: FUNCTIONAL

## 2024-05-23 DEVICE — SCREW BNE L16MM DIA5MM CNDYL S STL ST VAR ANG LOK COMPR T25: Type: IMPLANTABLE DEVICE | Site: FEMUR | Status: FUNCTIONAL

## 2024-05-23 DEVICE — SCREW BNE L36MM DIA5MM CNDYL S STL ST VAR ANG LOK COMPR T25: Type: IMPLANTABLE DEVICE | Site: FEMUR | Status: FUNCTIONAL

## 2024-05-23 DEVICE — SCREW BNE L32MM DIA4.5MM PROX CORT TIB S STL ST LOK FULL: Type: IMPLANTABLE DEVICE | Site: FEMUR | Status: FUNCTIONAL

## 2024-05-23 DEVICE — 1.7MM CABLE WITH CRIMP 750MM-STERILE: Type: IMPLANTABLE DEVICE | Site: FEMUR | Status: FUNCTIONAL

## 2024-05-23 DEVICE — SCREW BNE L65MM DIA5MM CNDYL S STL ST VAR ANG LOK T25: Type: IMPLANTABLE DEVICE | Site: FEMUR | Status: FUNCTIONAL

## 2024-05-23 DEVICE — PLATE BNE L301MM 14 H NONSTERILE L CNDYL S STL CRV LOK: Type: IMPLANTABLE DEVICE | Site: FEMUR | Status: FUNCTIONAL

## 2024-05-23 DEVICE — SCREW BNE L32MM DIA5MM CNDYL S STL ST VAR ANG LOK T25: Type: IMPLANTABLE DEVICE | Site: FEMUR | Status: FUNCTIONAL

## 2024-05-23 DEVICE — SCREW BNE L48MM DIA4.5MM PROX CORT TIB S STL ST LOK FULL: Type: IMPLANTABLE DEVICE | Site: FEMUR | Status: FUNCTIONAL

## 2024-05-23 RX ORDER — ONDANSETRON 2 MG/ML
INJECTION INTRAMUSCULAR; INTRAVENOUS PRN
Status: DISCONTINUED | OUTPATIENT
Start: 2024-05-23 | End: 2024-05-23 | Stop reason: SDUPTHER

## 2024-05-23 RX ORDER — FENTANYL CITRATE 0.05 MG/ML
25 INJECTION, SOLUTION INTRAMUSCULAR; INTRAVENOUS EVERY 5 MIN PRN
Status: DISCONTINUED | OUTPATIENT
Start: 2024-05-23 | End: 2024-05-23 | Stop reason: HOSPADM

## 2024-05-23 RX ORDER — SODIUM CHLORIDE 9 MG/ML
INJECTION, SOLUTION INTRAVENOUS PRN
Status: DISCONTINUED | OUTPATIENT
Start: 2024-05-23 | End: 2024-05-23 | Stop reason: HOSPADM

## 2024-05-23 RX ORDER — LABETALOL HYDROCHLORIDE 5 MG/ML
10 INJECTION, SOLUTION INTRAVENOUS
Status: DISCONTINUED | OUTPATIENT
Start: 2024-05-23 | End: 2024-05-23 | Stop reason: HOSPADM

## 2024-05-23 RX ORDER — METOPROLOL SUCCINATE 100 MG/1
100 TABLET, EXTENDED RELEASE ORAL EVERY MORNING
Status: DISCONTINUED | OUTPATIENT
Start: 2024-05-24 | End: 2024-05-29 | Stop reason: HOSPADM

## 2024-05-23 RX ORDER — SENNOSIDES A AND B 8.6 MG/1
1 TABLET, FILM COATED ORAL NIGHTLY
Status: DISCONTINUED | OUTPATIENT
Start: 2024-05-23 | End: 2024-05-29 | Stop reason: HOSPADM

## 2024-05-23 RX ORDER — LIDOCAINE HYDROCHLORIDE 20 MG/ML
INJECTION, SOLUTION INTRAVENOUS PRN
Status: DISCONTINUED | OUTPATIENT
Start: 2024-05-23 | End: 2024-05-23 | Stop reason: SDUPTHER

## 2024-05-23 RX ORDER — ENOXAPARIN SODIUM 100 MG/ML
30 INJECTION SUBCUTANEOUS 2 TIMES DAILY
Status: DISCONTINUED | OUTPATIENT
Start: 2024-05-24 | End: 2024-05-29 | Stop reason: HOSPADM

## 2024-05-23 RX ORDER — METOCLOPRAMIDE HYDROCHLORIDE 5 MG/ML
10 INJECTION INTRAMUSCULAR; INTRAVENOUS
Status: COMPLETED | OUTPATIENT
Start: 2024-05-23 | End: 2024-05-23

## 2024-05-23 RX ORDER — KETOROLAC TROMETHAMINE 15 MG/ML
7.5 INJECTION, SOLUTION INTRAMUSCULAR; INTRAVENOUS EVERY 6 HOURS
Status: COMPLETED | OUTPATIENT
Start: 2024-05-23 | End: 2024-05-24

## 2024-05-23 RX ORDER — DEXAMETHASONE SODIUM PHOSPHATE 4 MG/ML
INJECTION, SOLUTION INTRA-ARTICULAR; INTRALESIONAL; INTRAMUSCULAR; INTRAVENOUS; SOFT TISSUE PRN
Status: DISCONTINUED | OUTPATIENT
Start: 2024-05-23 | End: 2024-05-23 | Stop reason: SDUPTHER

## 2024-05-23 RX ORDER — NALOXONE HYDROCHLORIDE 0.4 MG/ML
INJECTION, SOLUTION INTRAMUSCULAR; INTRAVENOUS; SUBCUTANEOUS PRN
Status: DISCONTINUED | OUTPATIENT
Start: 2024-05-23 | End: 2024-05-23 | Stop reason: HOSPADM

## 2024-05-23 RX ORDER — POLYETHYLENE GLYCOL 3350 17 G/17G
17 POWDER, FOR SOLUTION ORAL DAILY
Status: DISCONTINUED | OUTPATIENT
Start: 2024-05-23 | End: 2024-05-26

## 2024-05-23 RX ORDER — PROPOFOL 10 MG/ML
INJECTION, EMULSION INTRAVENOUS PRN
Status: DISCONTINUED | OUTPATIENT
Start: 2024-05-23 | End: 2024-05-23 | Stop reason: SDUPTHER

## 2024-05-23 RX ORDER — DEXTROSE MONOHYDRATE 100 MG/ML
INJECTION, SOLUTION INTRAVENOUS CONTINUOUS PRN
Status: DISCONTINUED | OUTPATIENT
Start: 2024-05-23 | End: 2024-05-23 | Stop reason: HOSPADM

## 2024-05-23 RX ORDER — GLUCAGON 1 MG/ML
1 KIT INJECTION PRN
Status: DISCONTINUED | OUTPATIENT
Start: 2024-05-23 | End: 2024-05-23 | Stop reason: HOSPADM

## 2024-05-23 RX ORDER — VANCOMYCIN HYDROCHLORIDE 1 G/20ML
INJECTION, POWDER, LYOPHILIZED, FOR SOLUTION INTRAVENOUS PRN
Status: DISCONTINUED | OUTPATIENT
Start: 2024-05-23 | End: 2024-05-23 | Stop reason: ALTCHOICE

## 2024-05-23 RX ORDER — MAGNESIUM HYDROXIDE 1200 MG/15ML
LIQUID ORAL CONTINUOUS PRN
Status: COMPLETED | OUTPATIENT
Start: 2024-05-23 | End: 2024-05-23

## 2024-05-23 RX ORDER — HYDROCHLOROTHIAZIDE 25 MG/1
12.5 TABLET ORAL DAILY
Status: DISCONTINUED | OUTPATIENT
Start: 2024-05-24 | End: 2024-05-29 | Stop reason: HOSPADM

## 2024-05-23 RX ORDER — OXYCODONE HYDROCHLORIDE 5 MG/1
2.5 TABLET ORAL EVERY 4 HOURS PRN
Status: DISCONTINUED | OUTPATIENT
Start: 2024-05-23 | End: 2024-05-29 | Stop reason: HOSPADM

## 2024-05-23 RX ORDER — HYDROCHLOROTHIAZIDE 12.5 MG/1
12.5 TABLET ORAL DAILY
Status: DISCONTINUED | OUTPATIENT
Start: 2024-05-23 | End: 2024-05-23

## 2024-05-23 RX ORDER — SODIUM CHLORIDE 0.9 % (FLUSH) 0.9 %
5-40 SYRINGE (ML) INJECTION PRN
Status: DISCONTINUED | OUTPATIENT
Start: 2024-05-23 | End: 2024-05-23 | Stop reason: HOSPADM

## 2024-05-23 RX ORDER — METHOCARBAMOL 500 MG/1
500 TABLET, FILM COATED ORAL 3 TIMES DAILY
Status: DISCONTINUED | OUTPATIENT
Start: 2024-05-23 | End: 2024-05-29 | Stop reason: HOSPADM

## 2024-05-23 RX ORDER — OXYCODONE HYDROCHLORIDE 5 MG/1
5 TABLET ORAL EVERY 4 HOURS PRN
Status: DISCONTINUED | OUTPATIENT
Start: 2024-05-23 | End: 2024-05-29 | Stop reason: HOSPADM

## 2024-05-23 RX ORDER — HYDRALAZINE HYDROCHLORIDE 20 MG/ML
10 INJECTION INTRAMUSCULAR; INTRAVENOUS
Status: DISCONTINUED | OUTPATIENT
Start: 2024-05-23 | End: 2024-05-23 | Stop reason: HOSPADM

## 2024-05-23 RX ORDER — ONDANSETRON 2 MG/ML
4 INJECTION INTRAMUSCULAR; INTRAVENOUS
Status: DISCONTINUED | OUTPATIENT
Start: 2024-05-23 | End: 2024-05-23 | Stop reason: HOSPADM

## 2024-05-23 RX ORDER — DILTIAZEM HYDROCHLORIDE 240 MG/1
240 CAPSULE, COATED, EXTENDED RELEASE ORAL DAILY
Status: DISCONTINUED | OUTPATIENT
Start: 2024-05-24 | End: 2024-05-29 | Stop reason: HOSPADM

## 2024-05-23 RX ORDER — UREA 10 %
1000 LOTION (ML) TOPICAL DAILY
Status: DISCONTINUED | OUTPATIENT
Start: 2024-05-23 | End: 2024-05-29 | Stop reason: HOSPADM

## 2024-05-23 RX ORDER — ROCURONIUM BROMIDE 10 MG/ML
INJECTION, SOLUTION INTRAVENOUS PRN
Status: DISCONTINUED | OUTPATIENT
Start: 2024-05-23 | End: 2024-05-23 | Stop reason: SDUPTHER

## 2024-05-23 RX ORDER — LIDOCAINE 4 G/G
1 PATCH TOPICAL DAILY
Status: DISCONTINUED | OUTPATIENT
Start: 2024-05-23 | End: 2024-05-29 | Stop reason: HOSPADM

## 2024-05-23 RX ORDER — PRAVASTATIN SODIUM 40 MG
40 TABLET ORAL NIGHTLY
Status: DISCONTINUED | OUTPATIENT
Start: 2024-05-23 | End: 2024-05-23

## 2024-05-23 RX ORDER — SODIUM CHLORIDE, SODIUM LACTATE, POTASSIUM CHLORIDE, CALCIUM CHLORIDE 600; 310; 30; 20 MG/100ML; MG/100ML; MG/100ML; MG/100ML
INJECTION, SOLUTION INTRAVENOUS CONTINUOUS
Status: DISCONTINUED | OUTPATIENT
Start: 2024-05-23 | End: 2024-05-24

## 2024-05-23 RX ORDER — SODIUM CHLORIDE 0.9 % (FLUSH) 0.9 %
5-40 SYRINGE (ML) INJECTION EVERY 12 HOURS SCHEDULED
Status: DISCONTINUED | OUTPATIENT
Start: 2024-05-23 | End: 2024-05-23 | Stop reason: HOSPADM

## 2024-05-23 RX ORDER — CYCLOBENZAPRINE HCL 10 MG
10 TABLET ORAL EVERY 12 HOURS PRN
Status: DISCONTINUED | OUTPATIENT
Start: 2024-05-23 | End: 2024-05-26

## 2024-05-23 RX ORDER — PRAVASTATIN SODIUM 40 MG
40 TABLET ORAL NIGHTLY
Status: DISCONTINUED | OUTPATIENT
Start: 2024-05-24 | End: 2024-05-29 | Stop reason: HOSPADM

## 2024-05-23 RX ORDER — IPRATROPIUM BROMIDE AND ALBUTEROL SULFATE 2.5; .5 MG/3ML; MG/3ML
1 SOLUTION RESPIRATORY (INHALATION)
Status: DISCONTINUED | OUTPATIENT
Start: 2024-05-23 | End: 2024-05-23 | Stop reason: HOSPADM

## 2024-05-23 RX ORDER — FENTANYL CITRATE 50 UG/ML
INJECTION, SOLUTION INTRAMUSCULAR; INTRAVENOUS PRN
Status: DISCONTINUED | OUTPATIENT
Start: 2024-05-23 | End: 2024-05-23 | Stop reason: SDUPTHER

## 2024-05-23 RX ORDER — ACETAMINOPHEN 325 MG/1
650 TABLET ORAL EVERY 6 HOURS SCHEDULED
Status: DISCONTINUED | OUTPATIENT
Start: 2024-05-23 | End: 2024-05-29 | Stop reason: HOSPADM

## 2024-05-23 RX ORDER — DILTIAZEM HYDROCHLORIDE 240 MG/1
240 CAPSULE, COATED, EXTENDED RELEASE ORAL DAILY
Status: DISCONTINUED | OUTPATIENT
Start: 2024-05-23 | End: 2024-05-23

## 2024-05-23 RX ORDER — POTASSIUM CHLORIDE 750 MG/1
10 TABLET, FILM COATED, EXTENDED RELEASE ORAL 2 TIMES DAILY
Status: DISCONTINUED | OUTPATIENT
Start: 2024-05-23 | End: 2024-05-29 | Stop reason: HOSPADM

## 2024-05-23 RX ADMIN — FENTANYL CITRATE 50 MCG: 50 INJECTION, SOLUTION INTRAMUSCULAR; INTRAVENOUS at 17:20

## 2024-05-23 RX ADMIN — FENTANYL CITRATE 25 MCG: 50 INJECTION, SOLUTION INTRAMUSCULAR; INTRAVENOUS at 17:13

## 2024-05-23 RX ADMIN — TRANEXAMIC ACID 1000 MG: 100 INJECTION, SOLUTION INTRAVENOUS at 18:35

## 2024-05-23 RX ADMIN — METOCLOPRAMIDE HYDROCHLORIDE 10 MG: 5 INJECTION INTRAMUSCULAR; INTRAVENOUS at 19:47

## 2024-05-23 RX ADMIN — DEXAMETHASONE SODIUM PHOSPHATE 4 MG: 4 INJECTION INTRA-ARTICULAR; INTRALESIONAL; INTRAMUSCULAR; INTRAVENOUS; SOFT TISSUE at 17:07

## 2024-05-23 RX ADMIN — KETOROLAC TROMETHAMINE 7.5 MG: 15 INJECTION, SOLUTION INTRAMUSCULAR; INTRAVENOUS at 23:34

## 2024-05-23 RX ADMIN — FENTANYL CITRATE 25 MCG: 50 INJECTION, SOLUTION INTRAMUSCULAR; INTRAVENOUS at 16:54

## 2024-05-23 RX ADMIN — SUGAMMADEX 200 MG: 100 INJECTION, SOLUTION INTRAVENOUS at 18:57

## 2024-05-23 RX ADMIN — FENTANYL CITRATE 50 MCG: 50 INJECTION, SOLUTION INTRAMUSCULAR; INTRAVENOUS at 17:37

## 2024-05-23 RX ADMIN — CEFAZOLIN 2000 MG: 2 INJECTION, POWDER, FOR SOLUTION INTRAMUSCULAR; INTRAVENOUS at 23:33

## 2024-05-23 RX ADMIN — POTASSIUM CHLORIDE 10 MEQ: 750 TABLET, FILM COATED, EXTENDED RELEASE ORAL at 21:28

## 2024-05-23 RX ADMIN — LIDOCAINE HYDROCHLORIDE 60 MG: 20 INJECTION, SOLUTION INTRAVENOUS at 16:54

## 2024-05-23 RX ADMIN — ACETAMINOPHEN 325MG 650 MG: 325 TABLET ORAL at 23:34

## 2024-05-23 RX ADMIN — ROCURONIUM BROMIDE 40 MG: 10 INJECTION, SOLUTION INTRAVENOUS at 16:54

## 2024-05-23 RX ADMIN — Medication 0.1 MG: at 18:11

## 2024-05-23 RX ADMIN — ACETAMINOPHEN 650 MG: 325 TABLET ORAL at 04:56

## 2024-05-23 RX ADMIN — PROPOFOL 100 MG: 10 INJECTION, EMULSION INTRAVENOUS at 16:54

## 2024-05-23 RX ADMIN — CEFAZOLIN 2000 MG: 2 INJECTION, POWDER, FOR SOLUTION INTRAMUSCULAR; INTRAVENOUS at 17:05

## 2024-05-23 RX ADMIN — SENNOSIDES 8.6 MG: 8.6 TABLET, FILM COATED ORAL at 21:28

## 2024-05-23 RX ADMIN — HYDROMORPHONE HYDROCHLORIDE 0.4 MG: 1 INJECTION, SOLUTION INTRAMUSCULAR; INTRAVENOUS; SUBCUTANEOUS at 18:39

## 2024-05-23 RX ADMIN — SODIUM CHLORIDE, POTASSIUM CHLORIDE, SODIUM LACTATE AND CALCIUM CHLORIDE: 600; 310; 30; 20 INJECTION, SOLUTION INTRAVENOUS at 05:42

## 2024-05-23 RX ADMIN — TRANEXAMIC ACID 1000 MG: 100 INJECTION, SOLUTION INTRAVENOUS at 17:08

## 2024-05-23 RX ADMIN — Medication 10 ML: at 21:28

## 2024-05-23 RX ADMIN — Medication 0.2 MG: at 16:57

## 2024-05-23 RX ADMIN — METHOCARBAMOL TABLETS 500 MG: 500 TABLET, COATED ORAL at 21:28

## 2024-05-23 RX ADMIN — ONDANSETRON 4 MG: 2 INJECTION INTRAMUSCULAR; INTRAVENOUS at 18:32

## 2024-05-23 ASSESSMENT — PAIN DESCRIPTION - DESCRIPTORS: DESCRIPTORS: DULL;SORE

## 2024-05-23 ASSESSMENT — PAIN SCALES - GENERAL
PAINLEVEL_OUTOF10: 0
PAINLEVEL_OUTOF10: 3
PAINLEVEL_OUTOF10: 0
PAINLEVEL_OUTOF10: 0
PAINLEVEL_OUTOF10: 1
PAINLEVEL_OUTOF10: 0

## 2024-05-23 ASSESSMENT — PAIN DESCRIPTION - LOCATION: LOCATION: LEG

## 2024-05-23 ASSESSMENT — PAIN DESCRIPTION - ORIENTATION: ORIENTATION: LEFT

## 2024-05-23 NOTE — PLAN OF CARE
Patient progressing towards discharge    Problem: Pain  Goal: Verbalizes/displays adequate comfort level or baseline comfort level  Outcome: Progressing     Problem: Safety - Adult  Goal: Free from fall injury  Outcome: Progressing

## 2024-05-23 NOTE — OP NOTE
Operative Note      Patient: Terri Sanchez  YOB: 1945  MRN: [id number]    Date of Procedure: 5/23/2024    Pre-Op Diagnosis Codes:     * Closed displaced spiral fracture of shaft of left femur, initial encounter ([facility]) [S72.342A]    Post-Op Diagnosis: Same, severe osteoporosis    Procedure:     #1.  Open reduction intra fixation periprosthetic left distal femur fracture  #2.  Prophylactic stabilization left femur due to severe osteoporosis      Surgeon(s):  Sascha Coelho MD Ohliger Upperman, Erin, MD    Assistant:   First Assistant: Laura Castro  Physician Assistant: Raul Morin PA-C    Anesthesia: Choice    Estimated Blood Loss (mL): 400cc    Complications: None    Specimens:   * No specimens in log *    Implants:  Implant Name Type Inv. Item Serial No.  Lot No. LRB No. Used Action   CABLE SURG DIA1.7MM S STL HA CERCLAGE W/ CRMP [id number]] DEPUY SYN[employer] [facility]] - [id number]  CABLE SURG DIA1.7MM S STL HA CERCLAGE W/ CRMP [id number]] DEPU[employer] [facility]]  DEPU[employer] [place]- [id number] Left 1 Implanted   CABLE SURG DIA1.7MM S STL HA CERCLAGE W/ CRMP [id number]] DEPUY [employer] [facility]] - [id number]  CABLE SURG DIA1.7MM S STL HA CERCLAGE W/ CRMP [id number]] [employer]]  DEPU[employer]- [id number] Left 1 Implanted         Drains:   Urinary Catheter 05/23/24 [place] (Active)       Findings:  Infection Present At Time Of Surgery (PATOS) (choose all levels that have infection present):  No infection present  Other Findings: See operative note    Detailed Description of Procedure:   History/ Surgical indication  78-year-old female with history of multiple fragility type fractures sustained a fall resulting in a left periprosthetic distal femur fracture.  Had prior history of left hip cephalomedullary nail.  Presents today for operative fixation of left periprosthetic distal femur fracture, prophylactic stabilization of left femur due to history of severe osteoporosis.    The patient understood that  initiating postop day 1 if hemoglobin stable  X-Rays at follow up: 2 views left femur  Pain Medications: As prescribed              Electronically signed by Sascha Coelho MD on 5/23/2024 at 6:39 PM

## 2024-05-23 NOTE — PROGRESS NOTES
Therapy                            Cancellation/No-show Note      Date: 2024  Patient: Terri Sanchez (78 y.o. female)  : 1945  MRN:  [id number]  Referring Physician: Huang Smith PA    Medical Diagnosis: Humeral head fracture, left, closed, initial encounter [S45.292S]      Visit Information:  Visits to Date 8   No Show/Cancelled Appts: 0       For today's appointment patient:  [x]  Cancelled  []  Rescheduled appointment  []  No-show   []  Called pt to remind of next appointment     Reason given by patient:  []  Patient ill  []  Conflicting appointment  []  No transportation    []  Conflict with work  []  No reason given  [x]  Other: see comments     [] Pt has future appointments scheduled, no follow up needed  [x] Pt requests to be on hold.    Reason:   If > 2 weeks please discuss with therapist.  [] Therapist to call pt for follow up     Comments:   Pt fell and broke leg but would like to finish therapy for L shoulder. Requests to be placed on hold until able to continue. Reports they will receive a resume order and possible order for LE.   PT called and spoke with pts  who reports pt having surgery some time today for L LE. Advised  of D/C from PT for L shoulder at this time and when ready to resume new orders would be needed. Pt  in good understanding.     Signature: Electronically signed by Stevie Leslie PTA on 24 at 9:14 AM EDT Electronically signed by Victoria Medrano PT on 2024 at 9:24 AM

## 2024-05-23 NOTE — ANESTHESIA PRE PROCEDURE
Department of Anesthesiology  Preprocedure Note       Name:  Terri Sanchez   Age:  78 y.o.  :  1945                                          MRN:  [id number]         Date:  2024      Surgeon: Surgeon(s):  Sascha Coelho MD    Procedure: Procedure(s):  LEFT DISTAL FEMUR OPEN REDUCTION INTERNAL FIXATION  SYNTHES PLATE & SCREWS  C-ARM (PT HAS LEFT HUMERUS FRACTURE AS WELL)  LATEX ALLERGY  LEFT JOSELYN V.M.    Medications prior to admission:   Prior to Admission medications    Medication Sig Start Date End Date Taking? Authorizing Provider   methenamine (HIPREX) 1 g tablet Take 0.5 tablets by mouth daily 24   Senia Antonio MD   cephALEXin (KEFLEX) 250 MG capsule Take 1 capsule by mouth every evening 24   Senia Antonio MD   cephALEXin (KEFLEX) 500 MG capsule Take 1 capsule by mouth every evening 23   Senia Antonio MD   vitamin B-12 (CYANOCOBALAMIN) 1000 MCG tablet Take 1 tablet by mouth daily    Beto Cedeno MD   pravastatin (PRAVACHOL) 40 MG tablet  21   Beto Cedeno MD   metoprolol succinate (TOPROL XL) 100 MG extended release tablet TAKE ONE TABLET BY MOUTH IN THE MORNING 21   Beto Cedeno MD   potassium chloride (KLOR-CON) 10 MEQ extended release tablet TAKE ONE TABLET BY MOUTH TWO TIMES A DAY 3/1/21   Beto Cedeno MD   hydroCHLOROthiazide (MICROZIDE) 12.5 MG capsule TAKE ONE CAPSULE BY MOUTH EVERY DAY IN THE MORNING 3/17/21   Beto Cedeno MD   Cholecalciferol (VITAMIN D) 2000 units CAPS capsule Take by mouth Daily with supper    Beto Cedeno MD   aspirin 81 MG tablet Take by mouth daily  09   Beto Cedeno MD   Calcium Citrate-Vitamin D (CALCIUM CITRATE + D3 PO) Take by mouth 2 times daily  09   Beto Cedeno MD   diltiazem (CARDIZEM CD) 240 MG ER capsule Take 1 capsule by mouth daily  Patient taking differently: Take 1 capsule by mouth daily Indications: at bedtime 8/24/15   Geraldo Kirby MD

## 2024-05-23 NOTE — CONSULTS
Consult Note  Patient: Terri LOBO Laura  Unit/Bed: W277/W277-01  YOB: 1945  MRN: [id number]  Acct: [id number]   Admitting Diagnosis: Closed displaced spiral fracture of shaft of left femur, initial encounter ([facility]) [S72.342A]  Date:  5/22/2024  Hospital Day: 1    Complaint:  Fall   Inability to get up    History of Present Illness:  Pt fell at home in her bedroom (hard wood floors)- she is recovering form a left humerus fracture which she sustained on march 31- she has been NWB to the arm- and to not sha it she trued to fall to her knees. She denies hitting her head ans as far as she can tell she did not hurt the shoulder. She is no able to move her left leg and she was not able to get up   She does have left hip hardware in.   She was brought to ED where imaging showed  left femur fracture and admitted for further intervetnions    PMHx:  Past Medical History:   Diagnosis Date    Hyperlipidemia     meds > 5 yrs    Hypertension     meds > 5 yrs    Osteoarthritis     Prolapsed uterus        PSHx:  Past Surgical History:   Procedure Laterality Date    BREAST SURGERY Right     fibrocystic breast /Bx / benign    COLONOSCOPY      ENDOSCOPY, COLON, DIAGNOSTIC      EYE SURGERY      phaco with IOL OU.    FRACTURE SURGERY Right     due to fracture / had skeletal hardware    NE COLONOSCOPY W/BIOPSY SINGLE/MULTIPLE N/A 4/10/2018    COLONOSCOPY WITH BIOPSYS [place] [facility] [place]    [place] DELIGATION URETER N/A 10/4/2018    USLS/ SSLS performed [place] Senia [place] [facility] [place]    NE LAPS W/VAGINAL HYSTERECTOMY > 250 GRAMS N/A 10/4/2018    LAPARSCOPIC HYSTERECTOMY performed by Senia Antonio MD [place] [facility] [place]    VAGINA SURGERY N/A 10/4/2018    A-P REPAIR WITH TOT OBTRYX and SLING , CYSTOSCOPY performed [place] Senia Antonio [place] [facility] [place]       Social Hx:  Social History     Socioeconomic History    Marital status:      Spouse name: None    Number of children: None    Years of education: None

## 2024-05-23 NOTE — FLOWSHEET NOTE
1153: Patient assessments completed, patient remains NWB to E, awaiting surgical procedure, NPO. CTSCAN completed, EKG completed, NSR, in chart. Patietn pain controlled with tylenol. Family at bedside. Call light in reach. Will continue to monitor throughout this shift. .Electronically signed by Luci Wooten RN on 5/23/2024 at 11:54 AM

## 2024-05-23 NOTE — CARE COORDINATION
Case Management Assessment  Initial Evaluation    Date/Time of Evaluation: 5/23/2024 12:24 PM  Assessment Completed by: Pretty Cuba RN    If patient is discharged prior to next notation, then this note serves as note for discharge by case management.    Patient Name: Terri Sanchez                   YOB: 1945  Diagnosis: Closed displaced spiral fracture of shaft of left femur, initial encounter ([facility]) [S72.342A]                   Date / Time: 5/22/2024  6:35 PM    Patient Admission Status: Inpatient   Readmission Risk (Low < 19, Mod (19-27), High > 27): Readmission Risk Score: 9    Current PCP: Tacho Otto MD  PCP verified by CM? Yes    Chart Reviewed: Yes      History Provided by: Patient  Patient Orientation: Alert and Oriented, Person, Place, Situation, Self    Patient Cognition: Alert    Hospitalization in the last 30 days (Readmission):  No    If yes, Readmission Assessment in  Navigator will be completed.    Advance Directives:      Code Status: Full Code   Patient's Primary Decision Maker is: Legal Next of Kin      Discharge Planning:    Patient lives with: Spouse/Significant Other Type of Home: House  Primary Care Giver: Self  Patient Support Systems include: Spouse/Significant Other, Children   Current Financial resources:    Current community resources:    Current services prior to admission: None            Current DME:              Type of Home Care services:  None    ADLS  Prior functional level: Independent in ADLs/IADLs  Current functional level: Assistance with the following:, Mobility    PT AM-PAC:   /24  OT AM-PAC:   /24    Family can provide assistance at DC: Yes  Would you like Case Management to discuss the discharge plan with any other family members/significant others, and if so, who? Yes (pt sons and spouse)  Plans to Return to Present Housing: Unknown at present  Other Identified Issues/Barriers to RETURNING to current housing: no  Potential Assistance needed at

## 2024-05-23 NOTE — ANESTHESIA POSTPROCEDURE EVALUATION
Department of Anesthesiology  Postprocedure Note    Patient: Terri Sanchez  MRN: [id number]  YOB: 1945  Date of evaluation: 5/23/2024    Procedure Summary       Date: 05/23/24 Room / Location: [address]    Anesthesia Start: 1650 Anesthesia Stop: 1920    Procedure: LEFT DISTAL FEMUR OPEN REDUCTION INTERNAL FIXATION (Left) Diagnosis:       Closed displaced spiral fracture of shaft of left femur, initial encounter ([facility])      (Closed displaced spiral fracture of shaft of left femur, initial encounter ([facility]) [S72.342A])    Surgeons: Sascha Coelho MD Responsible Provider: Roberto Moran MD    Anesthesia Type: General ASA Status: 3            Anesthesia Type: General    Denice Phase I: Denice Score: 10    Denice Phase II:      Anesthesia Post Evaluation    Patient location during evaluation: bedside  Patient participation: complete - patient participated  Level of consciousness: awake and awake and alert  Airway patency: patent  Nausea & Vomiting: no nausea and no vomiting  Cardiovascular status: blood pressure returned to baseline and hemodynamically stable  Respiratory status: acceptable  Hydration status: euvolemic  Pain management: adequate    No notable events documented.

## 2024-05-23 NOTE — PROGRESS NOTES
[address] [place] [address]  Phone: [phone]    [] Certification  [] Recertification []  Plan of Care  [] Progress Note [x] Discharge      Referring Provider: Huang Smith PA     From:  Victoria Medrano, PT, DPT  Patient: Terri Sanchez (78 y.o. female) : 1945 Date: 2024  Medical Diagnosis: Humeral head fracture, left, closed, initial encounter [S42.292A]       Treatment Diagnosis: decreased ability to perform functional mobility tasks & ADLs, decreased left UE ROM & strength, & increased left UE pain    Plan of Care/Certification Expiration Date: 24   Progress Report Period from:  2024  to 2024    Visits to Date: 8 No Show: 0 Cancelled Appts: 0    OBJECTIVE:   Long Term Goals - Time Frame for Long Term Goals : 6-8 weeks  Goals Current/ Discharge status Status   Long Term Goal 1: Patient will be independent/compliant with PT HEP in order to demonstrate self management of symptoms upon D/C 24:Performs exercises frequently at home (2-3 x times/day)  Met   Long Term Goal 2: The patient will demo improved pain free L shoulder AROM >/=150* flexion & scaption; T1 ER; T12 IR in order to increase ease with functional mobility tasks & ADL's N/A Unable to assess   Long Term Goal 3: The patient will demonstrate improved L UE strength >/= 4/5 in order to increase ease with functional mobility tasks & ADL's  N/A Unable to assess   Long Term Goal 4: The patient will have an increase in UEFI score >/=9 points in order to increase functional activity tolerance.    N/A Unable to assess     Assessment:  Pt fell and broke left leg on 24. Pt was being seen for a L humeral head fx. Pt was making progress towards LTG and displayed improvements in L UE ROM and maintained good compliance with HEP as last reported on 24. Victoria Medrano, PT called and spoke with pts  who reports pt having surgery some time today for L LE. Advised  of D/C

## 2024-05-23 NOTE — PROGRESS NOTES
TRAUMA DAILY PROGRESS NOTE      Patient Name: Terri Sanchez  Admission Date 2024    Hospital Day: 1  Patient seen and examined on 2024    INTERVAL HISTORY/EVENTS     Background:  Terri Sanchez is a 78 y.o. female with a PMHx of colitis, rectal bleed, MATTY, recurrent UTI, and a humerus fracture 2 months ago, as well as previous left femur carmelita and HTN who presents with spiral fracture to her left femur.  She says she was up on her tip toes getting something from a shelf when her foot slipped and she fell over onto her hip.  - head strike, - LOC.  She has had multiple falls over the past few years, including falling stepping off of a curb in 2019 resulting in her hip fracture that was operated on, as well as slipping on black ice.  She is otherwise in good health and active.  She could walk up 2 flights of stairs without difficulty prior to her fracture.    Trauma workup revealed a left mid-distal femur fx. Patient admitted to [facility] under trauma service while awaiting operative intervention of left femur fx.       Hospital Course:  2024: S/p MFFS. Left distal radius fx. Admitted to [facility].      24 Hour Events:  S/p MFFS. Left distal radius fx. Admitted to [facility].    Patient doing well this morning. She endorses pain to her left hip which is currently well controlled on her current pain regimen. She is eager for surgery today. She has been making a lot of progress on her left shoulder pain and ROM. Discussed code status. Patient would like to talk with her . Will remain Full Code at this time. Will reassess code status at patient's request.     VSS on RA.     Labs significant for leukocytosis to 15.5 and otherwise labs appropriate.     No Is/Os documented.       PHYSICAL EXAM     Vitals Average, Min and Max for last 24 hours:  Temp: Temp: 97.9 °F (36.6 °C); Temp  Av.8 °F (36.6 °C)  Min: 97.7 °F (36.5 °C)  Max: 97.9 °F (36.6 °C)  Respirations: Resp  Av.3  Min: 10  Max: 18  Pulse: Pulse  Avg:

## 2024-05-23 NOTE — H&P
Department of General Surgery - Adult  Surgical Service trauma  Attending History and Physical        CHIEF COMPLAINT:  leg pain    Reason for Admission:  spiral fracture of femur    History Obtained From:  patient    HISTORY OF PRESENT ILLNESS:    The patient is a 78 y.o. female with significant past medical history of a humerus fracture 2 months ago, as well as previous left femur carmelita and HTN who presents with spiral fracture to her left femur.  She says she was up on her tip toes getting something from a shelf when her foot slipped and she fell over onto her hip.  - head strike, - LOC.  She has had multiple falls over the past few years, including falling stepping off of a curb in 2019 resulting in her hip fracture that was operated on, as well as slipping on black ice.  She is otherwise in good health and active.  She could walk up 2 flights of stairs without difficulty prior to her fracture.    Past Medical History:        Diagnosis Date    Hyperlipidemia     meds > 5 yrs    Hypertension     meds > 5 yrs    Osteoarthritis     Prolapsed uterus      Past Surgical History:        Procedure Laterality Date    BREAST SURGERY Right     fibrocystic breast /Bx / benign    COLONOSCOPY      ENDOSCOPY, COLON, DIAGNOSTIC      EYE SURGERY      phaco with IOL OU.    FRACTURE SURGERY Right     due to fracture / had skeletal hardware    VT COLONOSCOPY W/BIOPSY SINGLE/MULTIPLE N/A 4/10/2018    COLONOSCOPY WITH BIOPSYS performed by Conrad Ward MD at [facility] OR    VT DELIGATION URETER N/A 10/4/2018    USLS/ SSLS performed by Senia Antonio MD at [facility] OR    VT LAPS W/VAGINAL HYSTERECTOMY > 250 GRAMS N/A 10/4/2018    LAPARSCOPIC HYSTERECTOMY performed by Senia Antonio MD at [facility] OR    VAGINA SURGERY N/A 10/4/2018    A-P REPAIR WITH TOT OBTRYX and SLING , CYSTOSCOPY performed by Senia Antonio MD at [facility] OR     Medications Prior to Admission:   Medications Prior to Admission: methenamine (HIPREX) 1 g tablet, Take 0.5 tablets by

## 2024-05-24 LAB
ABO + RH BLD: NORMAL
ANION GAP SERPL CALCULATED.3IONS-SCNC: 11 MEQ/L (ref 9–15)
APTT PPP: 25.2 SEC (ref 24.4–36.8)
BASOPHILS # BLD: 0 K/UL (ref 0–0.2)
BASOPHILS NFR BLD: 0.1 %
BLD GP AB SCN SERPL QL: NORMAL
BLOOD BANK DISPENSE STATUS: NORMAL
BLOOD BANK PRODUCT CODE: NORMAL
BPU ID: NORMAL
BUN SERPL-MCNC: 18 MG/DL (ref 8–23)
CALCIUM SERPL-MCNC: 7.7 MG/DL (ref 8.5–9.9)
CHLORIDE SERPL-SCNC: 100 MEQ/L (ref 95–107)
CO2 SERPL-SCNC: 25 MEQ/L (ref 20–31)
CREAT SERPL-MCNC: 0.92 MG/DL (ref 0.5–0.9)
DESCRIPTION BLOOD BANK: NORMAL
EOSINOPHIL # BLD: 0 K/UL (ref 0–0.7)
EOSINOPHIL NFR BLD: 0 %
ERYTHROCYTE [DISTWIDTH] IN BLOOD BY AUTOMATED COUNT: 13.2 % (ref 11.5–14.5)
ERYTHROCYTE [DISTWIDTH] IN BLOOD BY AUTOMATED COUNT: 13.3 % (ref 11.5–14.5)
GLUCOSE SERPL-MCNC: 148 MG/DL (ref 70–99)
HCT VFR BLD AUTO: 20.4 % (ref 37–47)
HCT VFR BLD AUTO: 21.6 % (ref 37–47)
HCT VFR BLD AUTO: 22.5 % (ref 37–47)
HGB BLD-MCNC: 6.9 G/DL (ref 12–16)
HGB BLD-MCNC: 7.4 G/DL (ref 12–16)
HGB BLD-MCNC: 7.6 G/DL (ref 12–16)
INR PPP: 1
LYMPHOCYTES # BLD: 2.2 K/UL (ref 1–4.8)
LYMPHOCYTES NFR BLD: 18 %
MCH RBC QN AUTO: 30.9 PG (ref 27–31.3)
MCH RBC QN AUTO: 31.3 PG (ref 27–31.3)
MCHC RBC AUTO-ENTMCNC: 33.8 % (ref 33–37)
MCHC RBC AUTO-ENTMCNC: 33.8 % (ref 33–37)
MCV RBC AUTO: 91.5 FL (ref 79.4–94.8)
MCV RBC AUTO: 92.6 FL (ref 79.4–94.8)
MONOCYTES # BLD: 1.5 K/UL (ref 0.2–0.8)
MONOCYTES NFR BLD: 12 %
NEUTROPHILS # BLD: 8.6 K/UL (ref 1.4–6.5)
NEUTS SEG NFR BLD: 69.4 %
PLATELET # BLD AUTO: 215 K/UL (ref 130–400)
PLATELET # BLD AUTO: 216 K/UL (ref 130–400)
POTASSIUM SERPL-SCNC: 4.5 MEQ/L (ref 3.4–4.9)
PROTHROMBIN TIME: 13.6 SEC (ref 12.3–14.9)
RBC # BLD AUTO: 2.23 M/UL (ref 4.2–5.4)
RBC # BLD AUTO: 2.43 M/UL (ref 4.2–5.4)
SODIUM SERPL-SCNC: 136 MEQ/L (ref 135–144)
WBC # BLD AUTO: 11.6 K/UL (ref 4.8–10.8)
WBC # BLD AUTO: 12.4 K/UL (ref 4.8–10.8)

## 2024-05-24 PROCEDURE — 2580000003 HC RX 258: Performed by: NURSE PRACTITIONER

## 2024-05-24 PROCEDURE — 85027 COMPLETE CBC AUTOMATED: CPT

## 2024-05-24 PROCEDURE — 86900 BLOOD TYPING SEROLOGIC ABO: CPT

## 2024-05-24 PROCEDURE — 85025 COMPLETE CBC W/AUTO DIFF WBC: CPT

## 2024-05-24 PROCEDURE — 86901 BLOOD TYPING SEROLOGIC RH(D): CPT

## 2024-05-24 PROCEDURE — 85610 PROTHROMBIN TIME: CPT

## 2024-05-24 PROCEDURE — 1210000000 HC MED SURG R&B

## 2024-05-24 PROCEDURE — 36430 TRANSFUSION BLD/BLD COMPNT: CPT

## 2024-05-24 PROCEDURE — 30233N1 TRANSFUSION OF NONAUTOLOGOUS RED BLOOD CELLS INTO PERIPHERAL VEIN, PERCUTANEOUS APPROACH: ICD-10-PCS | Performed by: SURGERY

## 2024-05-24 PROCEDURE — 86923 COMPATIBILITY TEST ELECTRIC: CPT

## 2024-05-24 PROCEDURE — 97110 THERAPEUTIC EXERCISES: CPT

## 2024-05-24 PROCEDURE — 6370000000 HC RX 637 (ALT 250 FOR IP)

## 2024-05-24 PROCEDURE — P9016 RBC LEUKOCYTES REDUCED: HCPCS

## 2024-05-24 PROCEDURE — 36415 COLL VENOUS BLD VENIPUNCTURE: CPT

## 2024-05-24 PROCEDURE — 99232 SBSQ HOSP IP/OBS MODERATE 35: CPT

## 2024-05-24 PROCEDURE — 85014 HEMATOCRIT: CPT

## 2024-05-24 PROCEDURE — 97535 SELF CARE MNGMENT TRAINING: CPT

## 2024-05-24 PROCEDURE — 97162 PT EVAL MOD COMPLEX 30 MIN: CPT

## 2024-05-24 PROCEDURE — 85730 THROMBOPLASTIN TIME PARTIAL: CPT

## 2024-05-24 PROCEDURE — 94150 VITAL CAPACITY TEST: CPT

## 2024-05-24 PROCEDURE — 97166 OT EVAL MOD COMPLEX 45 MIN: CPT

## 2024-05-24 PROCEDURE — 80048 BASIC METABOLIC PNL TOTAL CA: CPT

## 2024-05-24 PROCEDURE — 85018 HEMOGLOBIN: CPT

## 2024-05-24 PROCEDURE — 6360000002 HC RX W HCPCS: Performed by: NURSE PRACTITIONER

## 2024-05-24 PROCEDURE — 86850 RBC ANTIBODY SCREEN: CPT

## 2024-05-24 RX ORDER — CEPHALEXIN 250 MG/1
250 CAPSULE ORAL EVERY EVENING
Status: DISCONTINUED | OUTPATIENT
Start: 2024-05-24 | End: 2024-05-29 | Stop reason: HOSPADM

## 2024-05-24 RX ORDER — SODIUM CHLORIDE, SODIUM LACTATE, POTASSIUM CHLORIDE, CALCIUM CHLORIDE 600; 310; 30; 20 MG/100ML; MG/100ML; MG/100ML; MG/100ML
INJECTION, SOLUTION INTRAVENOUS CONTINUOUS
Status: DISCONTINUED | OUTPATIENT
Start: 2024-05-24 | End: 2024-05-24

## 2024-05-24 RX ORDER — SODIUM CHLORIDE 9 MG/ML
INJECTION, SOLUTION INTRAVENOUS PRN
Status: DISCONTINUED | OUTPATIENT
Start: 2024-05-24 | End: 2024-05-24

## 2024-05-24 RX ORDER — SODIUM CHLORIDE 9 MG/ML
INJECTION, SOLUTION INTRAVENOUS PRN
Status: DISCONTINUED | OUTPATIENT
Start: 2024-05-24 | End: 2024-05-29 | Stop reason: HOSPADM

## 2024-05-24 RX ADMIN — KETOROLAC TROMETHAMINE 7.5 MG: 15 INJECTION, SOLUTION INTRAMUSCULAR; INTRAVENOUS at 05:30

## 2024-05-24 RX ADMIN — KETOROLAC TROMETHAMINE 7.5 MG: 15 INJECTION, SOLUTION INTRAMUSCULAR; INTRAVENOUS at 18:30

## 2024-05-24 RX ADMIN — METHOCARBAMOL TABLETS 500 MG: 500 TABLET, COATED ORAL at 09:49

## 2024-05-24 RX ADMIN — DILTIAZEM HYDROCHLORIDE 240 MG: 240 CAPSULE, EXTENDED RELEASE ORAL at 09:50

## 2024-05-24 RX ADMIN — SODIUM CHLORIDE, POTASSIUM CHLORIDE, SODIUM LACTATE AND CALCIUM CHLORIDE: 600; 310; 30; 20 INJECTION, SOLUTION INTRAVENOUS at 05:32

## 2024-05-24 RX ADMIN — Medication 10 ML: at 09:51

## 2024-05-24 RX ADMIN — CEFAZOLIN 2000 MG: 2 INJECTION, POWDER, FOR SOLUTION INTRAMUSCULAR; INTRAVENOUS at 07:06

## 2024-05-24 RX ADMIN — POTASSIUM CHLORIDE 10 MEQ: 750 TABLET, FILM COATED, EXTENDED RELEASE ORAL at 09:50

## 2024-05-24 RX ADMIN — METHOCARBAMOL TABLETS 500 MG: 500 TABLET, COATED ORAL at 14:04

## 2024-05-24 RX ADMIN — CEPHALEXIN 250 MG: 250 CAPSULE ORAL at 18:27

## 2024-05-24 RX ADMIN — OXYCODONE 5 MG: 5 TABLET ORAL at 15:30

## 2024-05-24 RX ADMIN — ACETAMINOPHEN 325MG 650 MG: 325 TABLET ORAL at 11:40

## 2024-05-24 RX ADMIN — KETOROLAC TROMETHAMINE 7.5 MG: 15 INJECTION, SOLUTION INTRAMUSCULAR; INTRAVENOUS at 11:40

## 2024-05-24 RX ADMIN — SODIUM CHLORIDE: 9 INJECTION, SOLUTION INTRAVENOUS at 18:50

## 2024-05-24 RX ADMIN — CYANOCOBALAMIN TAB 500 MCG 1000 MCG: 500 TAB at 09:48

## 2024-05-24 RX ADMIN — PRAVASTATIN SODIUM 40 MG: 40 TABLET ORAL at 21:46

## 2024-05-24 RX ADMIN — ACETAMINOPHEN 325MG 650 MG: 325 TABLET ORAL at 18:27

## 2024-05-24 RX ADMIN — SENNOSIDES 8.6 MG: 8.6 TABLET, FILM COATED ORAL at 21:44

## 2024-05-24 RX ADMIN — METOPROLOL SUCCINATE 100 MG: 100 TABLET, EXTENDED RELEASE ORAL at 09:50

## 2024-05-24 RX ADMIN — ACETAMINOPHEN 325MG 650 MG: 325 TABLET ORAL at 05:30

## 2024-05-24 RX ADMIN — Medication 10 ML: at 21:45

## 2024-05-24 RX ADMIN — POTASSIUM CHLORIDE 10 MEQ: 750 TABLET, FILM COATED, EXTENDED RELEASE ORAL at 21:44

## 2024-05-24 RX ADMIN — POLYETHYLENE GLYCOL 3350 17 G: 17 POWDER, FOR SOLUTION ORAL at 09:50

## 2024-05-24 RX ADMIN — HYDROCHLOROTHIAZIDE 12.5 MG: 25 TABLET ORAL at 09:50

## 2024-05-24 ASSESSMENT — PAIN DESCRIPTION - LOCATION
LOCATION: LEG

## 2024-05-24 ASSESSMENT — PAIN SCALES - GENERAL
PAINLEVEL_OUTOF10: 1
PAINLEVEL_OUTOF10: 2
PAINLEVEL_OUTOF10: 5
PAINLEVEL_OUTOF10: 1
PAINLEVEL_OUTOF10: 5
PAINLEVEL_OUTOF10: 3
PAINLEVEL_OUTOF10: 0

## 2024-05-24 ASSESSMENT — PAIN DESCRIPTION - ORIENTATION
ORIENTATION: LEFT

## 2024-05-24 ASSESSMENT — PAIN DESCRIPTION - DESCRIPTORS
DESCRIPTORS: SHARP
DESCRIPTORS: SORE
DESCRIPTORS: BURNING;SPASM;SHARP
DESCRIPTORS: ACHING;SPASM

## 2024-05-24 NOTE — PROGRESS NOTES
Physical Therapy Med Surg Daily Treatment Note  Facility/Department: [facility] 2W ORTHO TELE  Room: [place] [address]       NAME: Terri Sanchez  : 1945 (78 y.o.)  MRN: [id number]  CODE STATUS: Full Code    Date of Service: 2024    Patient Diagnosis(es): Closed displaced spiral fracture of shaft of left femur, initial encounter ([facility]) [S72.342A]   Chief Complaint   Patient presents with    Fall    Leg Pain     Pt fell and hurt her left leg       Leg Injury     Patient Active Problem List    Diagnosis Date Noted    Closed displaced spiral fracture of shaft of left femur, initial encounter ([facility]) 2024    BV (bacterial vaginosis) 2018    [place]-Walker grade 3 cystocele     MATTY (stress urinary incontinence, female)     Uterine prolapse 10/04/2018    Complete uterine prolapse 2018    [place] grade 4 cystocele 2018    [place] grade 2 rectocele 2018    Recurrent UTI 2018    Rectal bleed 2018    Colitis 2018        Past Medical History:   Diagnosis Date    Hyperlipidemia     meds > 5 yrs    Hypertension     meds > 5 yrs    Osteoarthritis     Prolapsed uterus      Past Surgical History:   Procedure Laterality Date    BREAST SURGERY Right     fibrocystic breast /Bx / benign    COLONOSCOPY      ENDOSCOPY, COLON, DIAGNOSTIC      EYE SURGERY      phaco with IOL OU.    FIBULA FRACTURE SURGERY Left 2024    LEFT DISTAL FEMUR OPEN REDUCTION INTERNAL FIXATION performed by Sascha Coelho MD at [facility] OR    FRACTURE SURGERY Right     due to fracture / had skeletal hardware    NM COLONOSCOPY W/BIOPSY SINGLE/MULTIPLE N/A 4/10/2018    COLONOSCOPY WITH BIOPSYS performed by Conrad Ward MD at [facility] OR    NM DELIGATION URETER N/A 10/4/2018    USLS/ SSLS performed by Senia Antonio MD at [facility] OR    NM LAPS W/VAGINAL HYSTERECTOMY > 250 GRAMS N/A 10/4/2018    LAPARSCOPIC HYSTERECTOMY performed by Senia Antonio MD at [facility] OR    VAGINA SURGERY N/A 10/4/2018    A-P REPAIR WITH TOT  be needed.  Stand by assistance = pt requires verbal cues or instructions from another person, close to but not touching, to perform the activity  Minimal assistance= pt performs 75% or more of the activity; assistance is required to complete the activity  Moderate assistance= pt performs 50% of the activity; assistance is required to complete the activity  Maximal assistance = pt performs 25% of the activity; assistance is required to complete the activity  Dependent = pt requires total physical assistance to accomplish the task

## 2024-05-24 NOTE — PLAN OF CARE
See OT evaluation for all goals and OT POC. Electronically signed by Kandi Martinez OTR/L on 5/24/2024 at 12:39 PM

## 2024-05-24 NOTE — PROGRESS NOTES
12:12 pt sitting up in chair after PT got her up. Visitor at bedside. Pt c/o feeling dizzy and lightheaded. This RN at BS pt is pale. VS obt and pt was hypotensive 76/45. Pt assisted back to bed and laying flat by this RN and PCA. Pt reina well. Pt states she feels better in bed and color in face started to improve. Pt reports she has a h/o fainting in the past multiple times.    12;20: BP now 125/42. Pt denies feeling dizzy or lightheaded.     1300 pt sitting up in bed and eating lunch. Pt states she feels fine. /59    1510:  Spoke with Carli from blood bank. Blood is ordered to transfuse today. She stated that HGB from this am was 7.6 and would need approval from pathology to transfuse with hgb still above 7. I explained that H/H was supposed to be drawn at 2pm per my previous conversation with trauma NICOLE Greene. Carli stated that the h/h order was canceled.  This RN called NICOLE Greene and explained the situation. Per Ramona, she will order h/h right then. PA also made aware of pt's near-syncopal episode and VS after incident.     1700: This RN checked to see if result was back from H/H and there was still no order placed. Call placed to NICOLE Greene to inform her. Update given to Radha in Blood bank.    1718: New order received for H/H    1755: phlebotomist on floor to draw another pt and this RN asked if they were going to get the h/h on Mrs Sanchez. They said they could not see the order.  Call placed to radha in blood bank and she asked me to reorder as cbc, since the h/h was not showing up and it was verified that it was coded for lab collect and not unit collect. Order placed per previous verbal order with Trauma PA.   1800: Phlebotomist at bedside for lab draw

## 2024-05-24 NOTE — PROGRESS NOTES
09/27/2018 11:43 AM    LABGLOM 62.8 05/24/2024 08:02 AM    GLUCOSE 148 05/24/2024 08:02 AM    CALCIUM 7.7 05/24/2024 08:02 AM    BILITOT 0.9 05/22/2024 08:02 PM    ALKPHOS 90 05/22/2024 08:02 PM    AST 16 05/22/2024 08:02 PM    ALT 17 05/22/2024 08:02 PM     Magnesium:    Lab Results   Component Value Date/Time    MG 1.8 04/10/2018 06:11 AM     PT/INR:    Lab Results   Component Value Date/Time    PROTIME 13.6 05/24/2024 08:02 AM    INR 1.0 05/24/2024 08:02 AM     PTT:    Lab Results   Component Value Date/Time    APTT 25.2 05/24/2024 08:02 AM       IMAGING RESULTS (PERSONALLY REVIEWED)     All admission imaging reviewed.     CT Left Shoulder:  3 part fracture of the proximal humerus with slight impaction.     ASSESSMENT & PLAN     Diagnoses:  S/p MFFS on 5/22/2024  Left distal femur fractire  Remote left humerus fx  Acute pain due to trauma  Acute post-op pain  Acute blood loss anemia      PMHx: colitis, rectal bleed, MATTY, recurrent UTI    Incidental Findings: None. Reviewed 5/23/2024 CJI.       ASSESSMENT/PLAN:  Neurological: Acute pain due to trauma  - Continue Tylenol 650mg q6h  - Continue Oxycodone 2.5/5mg q4h prn for moderate/severe pain  - Discontinue Dilaudid 0.5mg q4h prn for breakthrough pain  - Continue Toradol 7.5mg q6h  - Continue Lidocaine patches  - Continue Robaxin 500mg TID     Cardiovascular: VSS. EKG WNL.   - Obtain VS per unit protocol  - Continue home Diltiazem 240mg daily  - Continue home HCTZ 12.5mg daily  - Continue home Metoprolol 100mg daily  - Continue home Pravastatin 40mg nightly      Respiratory: Saturating appropriately on RA.   - Maintain O2 sats > 92%  - Encourage IS 10x hourly     GI/Diet:   - Continue regular diet  - Continue prn Zofran for N/V  - BR: Senna and Miralax, prn MoM     Renal/Electrolytes: Slight increase in Cr.   - Obtain AM BMP to trend Cr  - Obtain strict Is/Os  - Monitor UOP     ID:   Mild leukocytosis likely secondary to reactive process. Low suspicion for

## 2024-05-24 NOTE — PROGRESS NOTES
Physical Therapy Med Surg Initial Assessment  Facility/Department: [facility] 2W ORTHO TELE  Room: [facility] [address]       NAME: Terri Sanchez  : 1945 (78 y.o.)  MRN: [id number]  CODE STATUS: Full Code    Date of Service: 2024    Patient Diagnosis(es): Closed displaced spiral fracture of shaft of left femur, initial encounter ([facility]) [S72.342A]   Chief Complaint   Patient presents with    Fall    Leg Pain     Pt fell and hurt her left leg       Leg Injury     Patient Active Problem List    Diagnosis Date Noted    Closed displaced spiral fracture of shaft of left femur, initial encounter ([facility]) 2024    BV (bacterial vaginosis) 2018    [place] grade 3 cystocele     MATTY (stress urinary incontinence, female)     Uterine prolapse 10/04/2018    Complete uterine prolapse 2018    [place] grade 4 cystocele 2018    [place] grade 2 rectocele 2018    Recurrent UTI 2018    Rectal bleed 2018    Colitis 2018        Past Medical History:   Diagnosis Date    Hyperlipidemia     meds > 5 yrs    Hypertension     meds > 5 yrs    Osteoarthritis     Prolapsed uterus      Past Surgical History:   Procedure Laterality Date    BREAST SURGERY Right     fibrocystic breast /Bx / benign    COLONOSCOPY      ENDOSCOPY, COLON, DIAGNOSTIC      EYE SURGERY      phaco with IOL OU.    FIBULA FRACTURE SURGERY Left 2024    LEFT DISTAL FEMUR OPEN REDUCTION INTERNAL FIXATION performed by Sascha Coelho MD at [facility] [place]    FRACTURE SURGERY Right     due to fracture / had skeletal hardware    MS COLONOSCOPY W/BIOPSY SINGLE/MULTIPLE N/A 4/10/2018    COLONOSCOPY WITH BIOPSYS performed by Conrad Ward MD at [facility] [place]    MS DELIGATION URETER N/A 10/4/2018    USLS/ SSLS performed by Senia Antonio MD at [facility] [place]    MS LAPS W/VAGINAL HYSTERECTOMY > 250 GRAMS N/A 10/4/2018    LAPARSCOPIC HYSTERECTOMY performed by Senia Antonio MD at [facility] [place]    VAGINA SURGERY N/A 10/4/2018    A-P REPAIR WITH TOT  User may not have seen previous data.)  Decision Making: Medium Complexity  History: High  Exam: Med  Clinical Presentation: Med    Therapy Prognosis: Good  Barriers to Learning: none         DISCHARGE RECOMMENDATIONS:  Discharge Recommendations: Continue to assess pending progress, Therapy recommended at discharge    Assessment: Continued PT indicated to progress mobility and facilitate DC at highest level of indep and safety. Rec therapy stay prior to DC home for further mobility training. (Simultaneous filing. User may not have seen previous data.)  Requires PT Follow-Up: Yes       PLAN OF CARE:  Physical Therapy Plan  General Plan: 2 times a day 7 days a week  Current Treatment Recommendations: Strengthening, ROM, Balance training, Functional mobility training, Transfer training, Neuromuscular re-education, Manual, Gait training, Wheelchair mobility training, Endurance training, Equipment evaluation, education, & procurement, Modalities, Safety education & training, Patient/Caregiver education & training, Home exercise program    Safety Devices  Type of Devices: All fall risk precautions in place, Call light within reach, Left in chair, Chair alarm in place, Nurse notified    Goals:  Long Term Goals  Long Term Goal 1: Pt to complete bed mobility with indep  Long Term Goal 2: Pt to complete transfers with Claire  Long Term Goal 3: Pt to ambulate 5ft with LRD and indep  Long Term Goal 4: Pt to manage and propel WC indep 50ft    AMPAC (6 CLICK) BASIC MOBILITY  AM-PAC Inpatient Mobility Raw Score : 9     Therapy Time:   Individual   Time In 1130   Time Out 1154   Minutes 24   Timed Code Treatment Minutes: 9 Minutes (transfers)       Sarah Machuca, PT, 05/24/24 at 1:27 PM         Definitions for assistance levels  Independent = pt does not require any physical supervision or assistance from another person for activity completion. Device may be needed.  Stand by assistance = pt requires verbal cues or instructions from

## 2024-05-24 NOTE — PROGRESS NOTES
Hip surgery    Progress Note    Subjective:     Post-Operative Day: 1 Status Post left TFN  Systemic or Specific Complaints:No Complaints    Objective:     CURRENT VITALS:  BP (!) 106/91   Pulse 100   Temp 97.3 °F (36.3 °C)   Resp 13   Ht 1.524 m (5')   Wt 56.7 kg (125 lb)   LMP 09/27/1996   SpO2 98%   BMI 24.41 kg/m²     General: alert, appears stated age, and cooperative   Wound: Wound clean and dry no evidence of infection.   Motion: Painless Range of Motion   DVT Exam: No evidence of DVT seen on physical exam.       NVI in lower extremity. Thigh swollen but soft. Moving foot and ankle.      Data Review  Recent Labs     05/22/24 2002   WBC 15.5*   RBC 4.24   HGB 13.1   HCT 39.3   MCV 92.7   MCH 30.9   MCHC 33.3   RDW 12.9          Assessment:     Status Post left TFN. Doing well postoperatively.     Plan:      1: Continues current post-op course :  2:  Continue Deep venous thrombosis prophylaxis   3:  Continue physical therapy  4:  Continue Pain Control  5.  Spoke with the trauma service this morning due to the large open nature of the fracture follow-up with H&H as recommended every morning for at least 2 to 3 days    Once H&H is stable patient can resume DVT prophylaxis with Lovenox 30 mg twice daily for minimum of 28 days    Strict nonweightbearing, no range of motion to knee, knee immobilizer on when out of bed.

## 2024-05-24 NOTE — CONSENT
Informed Consent for Blood Component Transfusion Note    I have discussed with the patient the rationale for blood component transfusion; its benefits in treating or preventing fatigue, organ damage, or death; and its risk which includes mild transfusion reactions, rare risk of blood borne infection, or more serious but rare reactions. I have discussed the alternatives to transfusion, including the risk and consequences of not receiving transfusion. The patient had an opportunity to ask questions and had agreed to proceed with transfusion of blood components.    Electronically signed by Marilu Livingston PA-C on 5/24/24 at 10:50 AM EDT

## 2024-05-24 NOTE — CARE COORDINATION
LSW SPOKE WITH MAYLIN AT [facility] TO GIVE REFERRAL.  THEY ARE OUT OF NETWORK WITH PT'S INSURANCE BUT WILL CHECK FOR OUT OF NETWORK BENEFITS.        [facility] AND WILL [facility] PRE[facility]ERT.

## 2024-05-24 NOTE — CARE COORDINATION
Met with pt at bedside to discuss SNF choices. Pt states her first choice would be [facility] and she is unsure of second and third choices. She will discuss with family and let cm know. Per trauma pt will likely be here through the weekend.  Updated LSW who will send referral, pt insurance requires a precert.   Per LSW [facility] is out of network but they are checking if pt has out of network benefits. Updated pt and [facility].   LSW updated who will send referrals.

## 2024-05-24 NOTE — PROGRESS NOTES
[facility] OCCUPATIONAL THERAPY EVALUATION - ACUTE     NAME: Terri Sanchez  : 1945 (78 y.o.)  MRN: [id number]  CODE STATUS: Full Code  Room: W277/W277-01    Date of Service: 2024    Patient Diagnosis(es): Closed displaced spiral fracture of shaft of left femur, initial encounter ([facility]) [S72.342A]   Patient Active Problem List    Diagnosis Date Noted    Closed displaced spiral fracture of shaft of left femur, initial encounter ([facility]) 2024    BV (bacterial vaginosis) 2018    [place]-Walker grade 3 cystocele     MATTY (stress urinary incontinence, female)     Uterine prolapse 10/04/2018    Complete uterine prolapse 2018    [place]-Walker grade 4 cystocele 2018    [place]-Walker grade 2 rectocele 2018    Recurrent UTI 2018    Rectal bleed 2018    Colitis 2018        Past Medical History:   Diagnosis Date    Hyperlipidemia     meds > 5 yrs    Hypertension     meds > 5 yrs    Osteoarthritis     Prolapsed uterus      Past Surgical History:   Procedure Laterality Date    BREAST SURGERY Right     fibrocystic breast /Bx / benign    COLONOSCOPY      ENDOSCOPY, COLON, DIAGNOSTIC      EYE SURGERY      phaco with IOL OU.    FIBULA FRACTURE SURGERY Left 2024    LEFT DISTAL FEMUR OPEN REDUCTION INTERNAL FIXATION performed by Sascha Coelho MD at [facility] OR    FRACTURE SURGERY Right     due to fracture / had skeletal hardware    DC COLONOSCOPY W/BIOPSY SINGLE/MULTIPLE N/A 4/10/2018    COLONOSCOPY WITH BIOPSYS performed by Conrad Ward MD at [facility] OR    DC DELIGATION URETER N/A 10/4/2018    USLS/ SSLS performed by Senia Antonio MD at [facility] OR    DC LAPS W/VAGINAL HYSTERECTOMY > 250 GRAMS N/A 10/4/2018    LAPARSCOPIC HYSTERECTOMY performed by Senia Antonio MD at [facility] OR    VAGINA SURGERY N/A 10/4/2018    A-P REPAIR WITH TOT OBTRYX and SLING , CYSTOSCOPY performed by Senia Antonio MD at [facility] OR        Restrictions  Restrictions/Precautions: Fall Risk, Up as Tolerated, Weight  tx    Seated and Standing Balance:  Balance  Sitting: Intact  Standing: Impaired  Standing - Static: Fair  Standing - Dynamic: Poor    Functional Endurance:  Activity Tolerance  Activity Tolerance: Patient limited by fatigue;Patient limited by pain    D/C Recommendations:  OT D/C RECOMMENDATIONS  REQUIRES OT FOLLOW-UP: Yes    Equipment Recommendations:  OT Equipment Recommendations  Other: Continue to assess    OT Education:   Patient Education  Education Given To: Patient  Education Provided: Role of Therapy;Plan of Care  Education Method: Verbal  Barriers to Learning: None  Education Outcome: Verbalized understanding;Continued education needed    OT Follow Up:   OT D/C RECOMMENDATIONS  REQUIRES OT FOLLOW-UP: Yes       Assessment/Discharge Disposition:  Assessment: Pt is an 78 year old woman from home alone who presents to [facility] with the the above deficits d/t spiral fx LLE, seen s/p ORIF. Pt demonstrates decreased balance, endurance and strength and would benefit from continued OT to maximize independence and safety with ADL tasks.  Performance deficits / Impairments: Decreased functional mobility , Decreased ADL status, Decreased endurance, Decreased strength, Decreased balance, Decreased high-level IADLs  Prognosis: Good  Discharge Recommendations: Continue to assess pending progress  Decision Making: Medium Complexity     History: Pt's medical history is moderately complex  Exam: Pt has 6 performance deficits  Assistance / Modification: Pt requires max A    AMPAC (Six Click) Self care Score   How much help is needed for putting on and taking off regular lower body clothing?: Total  How much help is needed for bathing (which includes washing, rinsing, drying)?: A Lot  How much help is needed for toileting (which includes using toilet, bedpan, or urinal)?: Total  How much help is needed for putting on and taking off regular upper body clothing?: A Little  How much help is needed for taking care of personal

## 2024-05-25 LAB
ANION GAP SERPL CALCULATED.3IONS-SCNC: 8 MEQ/L (ref 9–15)
BUN SERPL-MCNC: 29 MG/DL (ref 8–23)
CALCIUM SERPL-MCNC: 7.8 MG/DL (ref 8.5–9.9)
CHLORIDE SERPL-SCNC: 103 MEQ/L (ref 95–107)
CO2 SERPL-SCNC: 26 MEQ/L (ref 20–31)
CREAT SERPL-MCNC: 0.87 MG/DL (ref 0.5–0.9)
ERYTHROCYTE [DISTWIDTH] IN BLOOD BY AUTOMATED COUNT: 14.5 % (ref 11.5–14.5)
GLUCOSE SERPL-MCNC: 109 MG/DL (ref 70–99)
HCT VFR BLD AUTO: 21.9 % (ref 37–47)
HCT VFR BLD AUTO: 22.5 % (ref 37–47)
HGB BLD-MCNC: 7.4 G/DL (ref 12–16)
HGB BLD-MCNC: 7.6 G/DL (ref 12–16)
MAGNESIUM SERPL-MCNC: 1.8 MG/DL (ref 1.7–2.4)
MCH RBC QN AUTO: 30.6 PG (ref 27–31.3)
MCHC RBC AUTO-ENTMCNC: 33.8 % (ref 33–37)
MCV RBC AUTO: 90.5 FL (ref 79.4–94.8)
PHOSPHATE SERPL-MCNC: 2.7 MG/DL (ref 2.3–4.8)
PLATELET # BLD AUTO: 167 K/UL (ref 130–400)
POTASSIUM SERPL-SCNC: 4.1 MEQ/L (ref 3.4–4.9)
RBC # BLD AUTO: 2.42 M/UL (ref 4.2–5.4)
SODIUM SERPL-SCNC: 137 MEQ/L (ref 135–144)
WBC # BLD AUTO: 9.8 K/UL (ref 4.8–10.8)

## 2024-05-25 PROCEDURE — 97535 SELF CARE MNGMENT TRAINING: CPT

## 2024-05-25 PROCEDURE — 36415 COLL VENOUS BLD VENIPUNCTURE: CPT

## 2024-05-25 PROCEDURE — 6370000000 HC RX 637 (ALT 250 FOR IP): Performed by: NURSE PRACTITIONER

## 2024-05-25 PROCEDURE — 80048 BASIC METABOLIC PNL TOTAL CA: CPT

## 2024-05-25 PROCEDURE — 6360000002 HC RX W HCPCS: Performed by: NURSE PRACTITIONER

## 2024-05-25 PROCEDURE — 99232 SBSQ HOSP IP/OBS MODERATE 35: CPT | Performed by: PHYSICIAN ASSISTANT

## 2024-05-25 PROCEDURE — 1210000000 HC MED SURG R&B

## 2024-05-25 PROCEDURE — 85018 HEMOGLOBIN: CPT

## 2024-05-25 PROCEDURE — 6360000002 HC RX W HCPCS: Performed by: PHYSICIAN ASSISTANT

## 2024-05-25 PROCEDURE — 2580000003 HC RX 258: Performed by: NURSE PRACTITIONER

## 2024-05-25 PROCEDURE — 6370000000 HC RX 637 (ALT 250 FOR IP): Performed by: PHYSICIAN ASSISTANT

## 2024-05-25 PROCEDURE — 6370000000 HC RX 637 (ALT 250 FOR IP)

## 2024-05-25 PROCEDURE — 83735 ASSAY OF MAGNESIUM: CPT

## 2024-05-25 PROCEDURE — 84100 ASSAY OF PHOSPHORUS: CPT

## 2024-05-25 PROCEDURE — 85014 HEMATOCRIT: CPT

## 2024-05-25 PROCEDURE — 85027 COMPLETE CBC AUTOMATED: CPT

## 2024-05-25 PROCEDURE — 51798 US URINE CAPACITY MEASURE: CPT

## 2024-05-25 RX ORDER — MAGNESIUM SULFATE 1 G/100ML
1000 INJECTION INTRAVENOUS ONCE
Status: COMPLETED | OUTPATIENT
Start: 2024-05-25 | End: 2024-05-25

## 2024-05-25 RX ADMIN — METHOCARBAMOL TABLETS 500 MG: 500 TABLET, COATED ORAL at 13:19

## 2024-05-25 RX ADMIN — POTASSIUM & SODIUM PHOSPHATES POWDER PACK 280-160-250 MG 250 MG: 280-160-250 PACK at 09:56

## 2024-05-25 RX ADMIN — DILTIAZEM HYDROCHLORIDE 240 MG: 240 CAPSULE, EXTENDED RELEASE ORAL at 08:59

## 2024-05-25 RX ADMIN — ACETAMINOPHEN 325MG 650 MG: 325 TABLET ORAL at 11:12

## 2024-05-25 RX ADMIN — PRAVASTATIN SODIUM 40 MG: 40 TABLET ORAL at 20:44

## 2024-05-25 RX ADMIN — ENOXAPARIN SODIUM 30 MG: 100 INJECTION SUBCUTANEOUS at 08:59

## 2024-05-25 RX ADMIN — METOPROLOL SUCCINATE 100 MG: 100 TABLET, EXTENDED RELEASE ORAL at 08:59

## 2024-05-25 RX ADMIN — CEPHALEXIN 250 MG: 250 CAPSULE ORAL at 18:21

## 2024-05-25 RX ADMIN — ENOXAPARIN SODIUM 30 MG: 100 INJECTION SUBCUTANEOUS at 20:44

## 2024-05-25 RX ADMIN — CYCLOBENZAPRINE 10 MG: 10 TABLET, FILM COATED ORAL at 11:11

## 2024-05-25 RX ADMIN — POTASSIUM & SODIUM PHOSPHATES POWDER PACK 280-160-250 MG 250 MG: 280-160-250 PACK at 13:19

## 2024-05-25 RX ADMIN — Medication 10 ML: at 09:56

## 2024-05-25 RX ADMIN — POLYETHYLENE GLYCOL 3350 17 G: 17 POWDER, FOR SOLUTION ORAL at 09:56

## 2024-05-25 RX ADMIN — METHOCARBAMOL TABLETS 500 MG: 500 TABLET, COATED ORAL at 20:44

## 2024-05-25 RX ADMIN — ACETAMINOPHEN 325MG 650 MG: 325 TABLET ORAL at 18:21

## 2024-05-25 RX ADMIN — Medication 10 ML: at 20:45

## 2024-05-25 RX ADMIN — METHOCARBAMOL TABLETS 500 MG: 500 TABLET, COATED ORAL at 08:59

## 2024-05-25 RX ADMIN — POTASSIUM & SODIUM PHOSPHATES POWDER PACK 280-160-250 MG 250 MG: 280-160-250 PACK at 20:44

## 2024-05-25 RX ADMIN — CYANOCOBALAMIN TAB 500 MCG 1000 MCG: 500 TAB at 08:59

## 2024-05-25 RX ADMIN — MAGNESIUM SULFATE HEPTAHYDRATE 1000 MG: 1 INJECTION, SOLUTION INTRAVENOUS at 10:04

## 2024-05-25 RX ADMIN — SENNOSIDES 8.6 MG: 8.6 TABLET, FILM COATED ORAL at 20:44

## 2024-05-25 ASSESSMENT — PAIN DESCRIPTION - ORIENTATION
ORIENTATION: LEFT

## 2024-05-25 ASSESSMENT — PAIN DESCRIPTION - LOCATION
LOCATION: LEG

## 2024-05-25 ASSESSMENT — PAIN SCALES - GENERAL
PAINLEVEL_OUTOF10: 2
PAINLEVEL_OUTOF10: 0
PAINLEVEL_OUTOF10: 0
PAINLEVEL_OUTOF10: 4
PAINLEVEL_OUTOF10: 3
PAINLEVEL_OUTOF10: 0
PAINLEVEL_OUTOF10: 4

## 2024-05-25 ASSESSMENT — PAIN DESCRIPTION - DESCRIPTORS
DESCRIPTORS: BURNING

## 2024-05-25 ASSESSMENT — PAIN DESCRIPTION - PAIN TYPE: TYPE: ACUTE PAIN

## 2024-05-25 NOTE — PROGRESS NOTES
Physical Therapy Missed Treatment   Facility/Department: [facility] MED SURG W277/W277-01    NAME: Terri Sanchez    : 1945 (78 y.o.)  MRN: [id number]    Account: [id number]  Gender: female    Chart reviewed, attempted PT at 13:31. Patient unavailable 2° to:    [x] Pt declined stating she has not been able to have a BM and having trouble voiding. Pt also noted she would like to rest and see if the medicine helps her pass her bowels before working with therapy this PM.      Will attempt PT treatment again at earliest convenience.      Electronically signed by EDEL BAINS PTA on 24 at 1:43 PM EDT

## 2024-05-25 NOTE — PROGRESS NOTES
TRAUMA DAILY PROGRESS NOTE      Patient Name: Terri Sanchez  Admission Date 5/22/2024    Hospital Day: 3  Patient seen and examined on 5/25/2024    INTERVAL HISTORY/EVENTS     Background:  Terri Sanchez is a 78 y.o. female with a PMHx of colitis, rectal bleed, MATTY, recurrent UTI, and a humerus fracture 2 months ago, as well as previous left femur carmelita and HTN who presents with spiral fracture to her left femur.  She says she was up on her tip toes getting something from a shelf when her foot slipped and she fell over onto her hip.  - head strike, - LOC. She has had multiple falls over the past few years, including falling stepping off of a curb in 2019 resulting in her hip fracture that was operated on, as well as slipping on black ice.  She is otherwise in good health and active.  She could walk up 2 flights of stairs without difficulty prior to her fracture.    Trauma workup revealed a left mid-distal femur fx. Patient admitted to [facility] under trauma service while awaiting operative intervention of left femur fx.       Hospital Course:  5/22/2024: S/p MFFS. Left distal radius fx. Admitted to [facility].  5/23/2024: S/p left TFN.   5/24/2024: Hgb drop to 6.9. 1uPRBC transfused      24 Hour Events:  No acute overnight events. Patient did receive 1uPRBC in the evening d/t hgb drop to 6.9. Patient endorses improvement in energy after transfusion. Continues to have lightheadedness when sitting up with physical therapy at edge of the bed. Endorses mild pain to left hip with muscle spasms, relieved with robaxin. She is tolerating PO but admits to poor fluid intake -- does not like water. Prefers coffee and diluted orange juice. De La Rosa in place. No BM.     Vitals: afebrile. No tachycardia. Hypotensive with SBP in 70s ~12pm 5/24. Blood pressure has improved with systolics 90s-low 100s but with MAP still 55-60s. SPO2 96-98% on RA.    Labs: No leukocytosis at 9.8. H/H stable post-transfusion (1uPRBC) at 7.4/21.9 (7.4/21.6). BUN

## 2024-05-25 NOTE — PLAN OF CARE
Problem: Pain  Goal: Verbalizes/displays adequate comfort level or baseline comfort level  Outcome: Progressing     Problem: Safety - Adult  Goal: Free from fall injury  Outcome: Progressing     Problem: Discharge Planning  Goal: Discharge to home or other facility with appropriate resources  Outcome: Progressing  Flowsheets (Taken 5/25/2024 0700)  Discharge to home or other facility with appropriate resources: Identify barriers to discharge with patient and caregiver

## 2024-05-25 NOTE — PROGRESS NOTES
Pt received 1 unit of blood, tolerated well, stated that she \"feels a completely different person and has a lot of energy\". Hemoglobin post transfusion 7.4, hct 21.6

## 2024-05-25 NOTE — FLOWSHEET NOTE
1125 De La Rosa catheter removed per order with no complications.Electronically signed by Jacklyn Mills RN on 5/25/2024 at 11:26 AM

## 2024-05-25 NOTE — FLOWSHEET NOTE
Pt has not voided since whiting catheter removal this morning. Denies discomfort, abdomen soft et flat. Bladder scan for 368ml.Can straight cath for retention 400 or > per order. Will cont to monitor.Electronically signed by Jacklyn Mills RN on 5/25/2024 at 6:17 PM

## 2024-05-25 NOTE — PROGRESS NOTES
Physical Therapy Med Surg Daily Treatment Note  Facility/Department: [facility] 2W ORTHO TELE  Room: [place] [address]       NAME: Terri Sanchez  : 1945 (78 y.o.)  MRN: [id number]  CODE STATUS: Full Code    Date of Service: 2024    Patient Diagnosis(es): Closed displaced spiral fracture of shaft of left femur, initial encounter ([facility]) [S72.342A]   Chief Complaint   Patient presents with    Fall    Leg Pain     Pt fell and hurt her left leg       Leg Injury     Patient Active Problem List    Diagnosis Date Noted    Closed displaced spiral fracture of shaft of left femur, initial encounter ([facility]) 2024    BV (bacterial vaginosis) 2018    [place]-Walker grade 3 cystocele     MATTY (stress urinary incontinence, female)     Uterine prolapse 10/04/2018    Complete uterine prolapse 2018    [place]-Walker grade 4 cystocele 2018    [place]-Walker grade 2 rectocele 2018    Recurrent UTI 2018    Rectal bleed 2018    Colitis 2018        Past Medical History:   Diagnosis Date    Hyperlipidemia     meds > 5 yrs    Hypertension     meds > 5 yrs    Osteoarthritis     Prolapsed uterus      Past Surgical History:   Procedure Laterality Date    BREAST SURGERY Right     fibrocystic breast /Bx / benign    COLONOSCOPY      ENDOSCOPY, COLON, DIAGNOSTIC      EYE SURGERY      phaco with IOL OU.    FIBULA FRACTURE SURGERY Left 2024    LEFT DISTAL FEMUR OPEN REDUCTION INTERNAL FIXATION performed by Sascha Coelho MD at [facility] OR    FRACTURE SURGERY Right     due to fracture / had skeletal hardware    IA COLONOSCOPY W/BIOPSY SINGLE/MULTIPLE N/A 4/10/2018    COLONOSCOPY WITH BIOPSYS performed by Conrad Ward MD at [facility] OR    IA DELIGATION URETER N/A 10/4/2018    USLS/ SSLS performed by Senia Antonio MD at [facility] OR    IA LAPS W/VAGINAL HYSTERECTOMY > 250 GRAMS N/A 10/4/2018    LAPARSCOPIC HYSTERECTOMY performed by Senia Antonio MD at [facility] OR    VAGINA SURGERY N/A 10/4/2018    A-P REPAIR WITH TOT  OBTRYX and SLING , CYSTOSCOPY performed by Senia Antonio MD at [facility] [place]       Chart Reviewed: Yes  Family / Caregiver Present: No    Restrictions:  Restrictions/Precautions: Fall Risk;Up as Tolerated;Weight Bearing  Lower Extremity Weight Bearing Restrictions  Left Lower Extremity Weight Bearing: Non Weight Bearing  Partial Weight Bearing Percentage Or Pounds: Knee immobilizer at all times out of bed, no ROM knee  Upper Extremity Weight Bearing Restrictions  Left Upper Extremity Weight Bearing: Weight Bearing As Tolerated    SUBJECTIVE:   Subjective: \"I don't want to sit in the chair. I almost passed out yesterday.\"    Pain  Pain: 3/10 L LE pain pre and post tx.    OBJECTIVE:        Bed mobility  Supine to Sit: Moderate assistance;Minimal assistance  Sit to Supine: Minimal assistance;Moderate assistance  Scooting: Stand by assistance;Minimal assistance  Bed Mobility Comments: HOB flat with use of bed rails. Increase time and effort to complete. Assistance with L LE in/out of the bed. L knee imobilizer donned throughout session. Once sitting EOB pt reported feeling lightheaded. BP 93/40, returned BTB and BP supine 106/53.      Activity Tolerance  Activity Tolerance: Patient tolerated treatment well          ASSESSMENT   Assessment: Attempted to sit EOB this session with pt noting feeling lightheaded and dizzy. BP monitored seated EOB 93/40, pt returned to supine and /53. Once returned BTB pt stated she felt better and was no longer dizzy.     Discharge Recommendations:  Continue to assess pending progress, Therapy recommended at discharge         Goals  Long Term Goals  Long Term Goal 1: Pt to complete bed mobility with indep  Long Term Goal 2: Pt to complete transfers with Claire  Long Term Goal 3: Pt to ambulate 5ft with LRD and indep  Long Term Goal 4: Pt to manage and propel WC indep 50ft    PLAN    General Plan: 2 times a day 7 days a week  Safety Devices  Type of Devices: All fall risk precautions in place,

## 2024-05-26 LAB
ANION GAP SERPL CALCULATED.3IONS-SCNC: 7 MEQ/L (ref 9–15)
BUN SERPL-MCNC: 19 MG/DL (ref 8–23)
CALCIUM SERPL-MCNC: 8.1 MG/DL (ref 8.5–9.9)
CHLORIDE SERPL-SCNC: 103 MEQ/L (ref 95–107)
CO2 SERPL-SCNC: 27 MEQ/L (ref 20–31)
CREAT SERPL-MCNC: 0.63 MG/DL (ref 0.5–0.9)
ERYTHROCYTE [DISTWIDTH] IN BLOOD BY AUTOMATED COUNT: 14.3 % (ref 11.5–14.5)
GLUCOSE SERPL-MCNC: 111 MG/DL (ref 70–99)
HCT VFR BLD AUTO: 23.1 % (ref 37–47)
HGB BLD-MCNC: 7.6 G/DL (ref 12–16)
MAGNESIUM SERPL-MCNC: 2.1 MG/DL (ref 1.7–2.4)
MCH RBC QN AUTO: 30.3 PG (ref 27–31.3)
MCHC RBC AUTO-ENTMCNC: 32.9 % (ref 33–37)
MCV RBC AUTO: 92 FL (ref 79.4–94.8)
PHOSPHATE SERPL-MCNC: 2.6 MG/DL (ref 2.3–4.8)
PLATELET # BLD AUTO: 181 K/UL (ref 130–400)
POTASSIUM SERPL-SCNC: 3.9 MEQ/L (ref 3.4–4.9)
RBC # BLD AUTO: 2.51 M/UL (ref 4.2–5.4)
SODIUM SERPL-SCNC: 137 MEQ/L (ref 135–144)
WBC # BLD AUTO: 9.4 K/UL (ref 4.8–10.8)

## 2024-05-26 PROCEDURE — 80048 BASIC METABOLIC PNL TOTAL CA: CPT

## 2024-05-26 PROCEDURE — 2580000003 HC RX 258: Performed by: NURSE PRACTITIONER

## 2024-05-26 PROCEDURE — 85027 COMPLETE CBC AUTOMATED: CPT

## 2024-05-26 PROCEDURE — 1210000000 HC MED SURG R&B

## 2024-05-26 PROCEDURE — 6370000000 HC RX 637 (ALT 250 FOR IP): Performed by: PHYSICIAN ASSISTANT

## 2024-05-26 PROCEDURE — 6360000002 HC RX W HCPCS: Performed by: NURSE PRACTITIONER

## 2024-05-26 PROCEDURE — 97535 SELF CARE MNGMENT TRAINING: CPT

## 2024-05-26 PROCEDURE — 36415 COLL VENOUS BLD VENIPUNCTURE: CPT

## 2024-05-26 PROCEDURE — 84100 ASSAY OF PHOSPHORUS: CPT

## 2024-05-26 PROCEDURE — 83735 ASSAY OF MAGNESIUM: CPT

## 2024-05-26 PROCEDURE — 99232 SBSQ HOSP IP/OBS MODERATE 35: CPT | Performed by: PHYSICIAN ASSISTANT

## 2024-05-26 PROCEDURE — 6370000000 HC RX 637 (ALT 250 FOR IP): Performed by: NURSE PRACTITIONER

## 2024-05-26 PROCEDURE — 6370000000 HC RX 637 (ALT 250 FOR IP)

## 2024-05-26 PROCEDURE — 51798 US URINE CAPACITY MEASURE: CPT

## 2024-05-26 RX ORDER — POLYETHYLENE GLYCOL 3350 17 G/17G
17 POWDER, FOR SOLUTION ORAL 2 TIMES DAILY
Status: DISCONTINUED | OUTPATIENT
Start: 2024-05-26 | End: 2024-05-27

## 2024-05-26 RX ADMIN — ACETAMINOPHEN 325MG 650 MG: 325 TABLET ORAL at 17:17

## 2024-05-26 RX ADMIN — POTASSIUM & SODIUM PHOSPHATES POWDER PACK 280-160-250 MG 250 MG: 280-160-250 PACK at 09:37

## 2024-05-26 RX ADMIN — POLYETHYLENE GLYCOL 3350 17 G: 17 POWDER, FOR SOLUTION ORAL at 20:20

## 2024-05-26 RX ADMIN — CYANOCOBALAMIN TAB 500 MCG 1000 MCG: 500 TAB at 09:22

## 2024-05-26 RX ADMIN — METHOCARBAMOL TABLETS 500 MG: 500 TABLET, COATED ORAL at 13:47

## 2024-05-26 RX ADMIN — Medication 10 ML: at 09:38

## 2024-05-26 RX ADMIN — CYCLOBENZAPRINE 10 MG: 10 TABLET, FILM COATED ORAL at 09:21

## 2024-05-26 RX ADMIN — ACETAMINOPHEN 325MG 650 MG: 325 TABLET ORAL at 00:17

## 2024-05-26 RX ADMIN — METHOCARBAMOL TABLETS 500 MG: 500 TABLET, COATED ORAL at 09:20

## 2024-05-26 RX ADMIN — SENNOSIDES 8.6 MG: 8.6 TABLET, FILM COATED ORAL at 20:20

## 2024-05-26 RX ADMIN — ENOXAPARIN SODIUM 30 MG: 100 INJECTION SUBCUTANEOUS at 09:21

## 2024-05-26 RX ADMIN — HYDROCHLOROTHIAZIDE 12.5 MG: 25 TABLET ORAL at 09:21

## 2024-05-26 RX ADMIN — PRAVASTATIN SODIUM 40 MG: 40 TABLET ORAL at 20:20

## 2024-05-26 RX ADMIN — ACETAMINOPHEN 325MG 650 MG: 325 TABLET ORAL at 13:46

## 2024-05-26 RX ADMIN — POTASSIUM & SODIUM PHOSPHATES POWDER PACK 280-160-250 MG 250 MG: 280-160-250 PACK at 13:47

## 2024-05-26 RX ADMIN — ACETAMINOPHEN 325MG 650 MG: 325 TABLET ORAL at 06:00

## 2024-05-26 RX ADMIN — METOPROLOL SUCCINATE 100 MG: 100 TABLET, EXTENDED RELEASE ORAL at 09:24

## 2024-05-26 RX ADMIN — CEPHALEXIN 250 MG: 250 CAPSULE ORAL at 17:18

## 2024-05-26 RX ADMIN — POLYETHYLENE GLYCOL 3350 17 G: 17 POWDER, FOR SOLUTION ORAL at 09:38

## 2024-05-26 RX ADMIN — ENOXAPARIN SODIUM 30 MG: 100 INJECTION SUBCUTANEOUS at 20:20

## 2024-05-26 ASSESSMENT — PAIN DESCRIPTION - ORIENTATION: ORIENTATION: LEFT

## 2024-05-26 ASSESSMENT — PAIN SCALES - GENERAL: PAINLEVEL_OUTOF10: 4

## 2024-05-26 ASSESSMENT — PAIN DESCRIPTION - LOCATION: LOCATION: LEG

## 2024-05-26 ASSESSMENT — PAIN DESCRIPTION - DESCRIPTORS: DESCRIPTORS: SORE

## 2024-05-26 NOTE — DISCHARGE INSTR - ACTIVITY
WB Weight bearing restriction: NWB to the left leg   NO ROM of knee- knee immobilizer on at all times

## 2024-05-26 NOTE — PROGRESS NOTES
Physical Therapy Med Surg Daily Treatment Note  Facility/Department: [facility] 2W ORTHO TELE  Room: [place] [address]       NAME: Terri Sanchez  : 1945 (78 y.o.)  MRN: [id number]  CODE STATUS: Full Code    Date of Service: 2024    Patient Diagnosis(es): Closed displaced spiral fracture of shaft of left femur, initial encounter ([facility]) [S72.342A]   Chief Complaint   Patient presents with    Fall    Leg Pain     Pt fell and hurt her left leg       Leg Injury     Patient Active Problem List    Diagnosis Date Noted    Closed displaced spiral fracture of shaft of left femur, initial encounter ([facility]) 2024    BV (bacterial vaginosis) 2018    [place]-Walker grade 3 cystocele     MATTY (stress urinary incontinence, female)     Uterine prolapse 10/04/2018    Complete uterine prolapse 2018    [place]-Walker grade 4 cystocele 2018    [place]-Walker grade 2 rectocele 2018    Recurrent UTI 2018    Rectal bleed 2018    Colitis 2018        Past Medical History:   Diagnosis Date    Hyperlipidemia     meds > 5 yrs    Hypertension     meds > 5 yrs    Osteoarthritis     Prolapsed uterus      Past Surgical History:   Procedure Laterality Date    BREAST SURGERY Right     fibrocystic breast /Bx / benign    COLONOSCOPY      ENDOSCOPY, COLON, DIAGNOSTIC      EYE SURGERY      phaco with IOL OU.    FIBULA FRACTURE SURGERY Left 2024    LEFT DISTAL FEMUR OPEN REDUCTION INTERNAL FIXATION performed by Sascha Coelho MD at [facility] OR    FRACTURE SURGERY Right     due to fracture / had skeletal hardware    ME COLONOSCOPY W/BIOPSY SINGLE/MULTIPLE N/A 4/10/2018    COLONOSCOPY WITH BIOPSYS performed by Conrad Ward MD at [facility] OR    ME DELIGATION URETER N/A 10/4/2018    USLS/ SSLS performed by Senia Antonio MD at [facility] OR    ME LAPS W/VAGINAL HYSTERECTOMY > 250 GRAMS N/A 10/4/2018    LAPARSCOPIC HYSTERECTOMY performed by Senia Antonio MD at [facility] OR    VAGINA SURGERY N/A 10/4/2018    A-P REPAIR WITH TOT

## 2024-05-26 NOTE — DISCHARGE INSTRUCTIONS
Discharge Instructions    Date of admission: 5/22/2024  Date of discharge: 05/29/24     You are being discharged to a skilled nursing facility    Follow up:  - Please call to schedule your follow-up appointments - information provided separately.  - You will need to follow up with:  - Follow up with Orthopedics in 2 weeks with Dr. Sascha Coelho for a post-op evalaution  - No trauma follow up indicated  - See below for information regarding contacting your primary care physician or establishing care with [facility] if you do not already have one.      DVT (clot) prevention:   Per trauma program protocol, patient DOES REQUIRE post-discharge VTE prophylaxis. Patient will require Lovenox 30mg BID for 6 weeks until 7/10/2024.      Restrictions:  - NWB LLE  - Maintain these restrictions until you are cleared by your provider at your follow up appointment.      Pain control:  - Take Tylenol 325 mg, 1-2 tablets every 4 hours as needed for mild to moderate pain. Mild to moderate pain is characterized by pain 1-6 out of 10 on a pain scale.  - Take Oxycodone 2.5 mg, 1 tablet every 8 hours for as needed for severe pain. Severe pain is characterized as pain of 7-10 out of 10 on a pain scale.   - Please begin to wean off of your pain medications as soon as possible.    - To do this, start taking Oxycodone every 12 hours, then only once per day if needed. Then stop.   - You may use Motrin and Tylenol until your pain is diminished enough for you to tolerate your pain.   - Your pain medication may be adjusted or discontinued in follow-up appointments, or by the supervising physician in an inpatient rehab setting.   - Please do not drive within 24 hours of taking Oxycodone or any Opiate medication.       Wound Care and Showering/Bathing:  - Okay to shower daily -- allow soap and water to run down any incisions sites. Do not scrub the area.  - If you cannot maintain your balance in the shower, then you should not shower. Sponge  a specialty clinic.     If you do not have a primary physician please call [phone] for guidance on finding a [facility] provider.     If you have questions or concerns, if your condition worsens or you  develop new symptoms please call the Trauma Care Line at [phone].    ---------------------------------------------------------------------------------------------------------------------  Femur fracture  Discharge Instructions    To prevent Clot formation, you have been placed on the following medication:  Anticoagulation   Surgical Site Care:  Dressing change every days and PRN (as needed) with 4 x 4 and porous tape. No betadine. If glue is present, leave open to air  Leave whole ace bandage on for the first 7 days- then remove whole surgical dressing and cover incisions as above- ok to reapply ace so that the knee immobilizer does not rub or teds  Knee immobilizer in place  Pt is NWB/ NO ROM of the knee on the left  Staples will be removed on post-operative day 14 and steri-strips applied  Showering is permitted starting POD1 if waterproof aquacell dressing is present or when incision is covered with waterproof dressing, such as 4 x 4 and tegaderm  Physical Therapy:  Weight Bearing Status:  WB Weight bearing restriction: NWB   NO ROM of knee- knee immobilizer on at all times    Pain Medications  You were given pain meds  Wean off pain medications as you deem appropriate as long as pain is under control  Cold packs/Ice packs/Machine  May be used 3 times daily for 15-30 minutes as necessary  Be sure to have a barrier (cloth, clothing, towel) between the site and the ice pack to prevent frostbite  Contact [facility] office if  Increased redness, swelling, drainage of any kind, and/or pain to surgery site.  As well as new onset fevers and or chills.  These could signify an infection.  Calf or thigh tenderness to touch as well as increased swelling or redness.  This could signify a clot

## 2024-05-26 NOTE — PROGRESS NOTES
discharge         Goals  Long Term Goals  Long Term Goal 1: Pt to complete bed mobility with indep  Long Term Goal 2: Pt to complete transfers with Claire  Long Term Goal 3: Pt to ambulate 5ft with LRD and indep  Long Term Goal 4: Pt to manage and propel WC indep 50ft    PLAN    General Plan: 2 times a day 7 days a week  Safety Devices  Type of Devices: All fall risk precautions in place, Call light within reach, Nurse notified, Left in bed, Bed alarm in place     AMPAC (6 CLICK) BASIC MOBILITY  AM-PAC Inpatient Mobility Raw Score : 9     Therapy Time   Individual   Time In 1245   Time Out 1305   Minutes 20     Timed Code Treatment Minutes: 20 Minutes   Bed mob/Trans 15  There ex 5    Stella Cedeño PTA, 05/26/24 at 1:09 PM   Electronically signed by Stella Cedeño PTA on 5/26/2024 at 1:09 PM        Definitions for assistance levels  Independent = pt does not require any physical supervision or assistance from another person for activity completion. Device may be needed.  Stand by assistance = pt requires verbal cues or instructions from another person, close to but not touching, to perform the activity  Minimal assistance= pt performs 75% or more of the activity; assistance is required to complete the activity  Moderate assistance= pt performs 50% of the activity; assistance is required to complete the activity  Maximal assistance = pt performs 25% of the activity; assistance is required to complete the activity  Dependent = pt requires total physical assistance to accomplish the task

## 2024-05-26 NOTE — PROGRESS NOTES
TRAUMA DAILY PROGRESS NOTE      Patient Name: Terri Sanchez  Admission Date 2024    Hospital Day: 4  Patient seen and examined on 2024    INTERVAL HISTORY/EVENTS     Background:  Terri Sanchez is a 78 y.o. female with a PMHx of colitis, rectal bleed, MATTY, recurrent UTI, and a humerus fracture 2 months ago, as well as previous left femur carmelita and HTN who presents with spiral fracture to her left femur.  She says she was up on her tip toes getting something from a shelf when her foot slipped and she fell over onto her hip.  - head strike, - LOC. She has had multiple falls over the past few years, including falling stepping off of a curb in 2019 resulting in her hip fracture that was operated on, as well as slipping on black ice.  She is otherwise in good health and active.  She could walk up 2 flights of stairs without difficulty prior to her fracture.    Trauma workup revealed a left mid-distal femur fx. Patient admitted to [facility] under trauma service while awaiting operative intervention of left femur fx.       Hospital Course:  2024: S/p MFFS. Left distal radius fx. Admitted to [facility].  2024: S/p left TFN.   2024: Hgb drop to 6.9. 1uPRBC transfused  2024: Hgb stabilized at 7.4 -> 7.6. HDS throughout the day.      24 Hour Events:  No acute overnight events. Patient with improved pain control. Remains in KI. Lightheaded when up with PT/OT. Poor PO fluid intake yesterday d/t patient's fluid preference. Tolerates PO. Voiding spontaneously. No BM.     Vitals: afebrile. VSS on RA.    Labs: No leukocytosis. H/H stable at 7.6/23.1 (7.6/22.5). BUN/Cr WNL. Phos 2.6.    UOP: 500mL documented    BM: none since admission      PHYSICAL EXAM     Vitals Average, Min and Max for last 24 hours:  Temp: Temp: 98.2 °F (36.8 °C); Temp  Av.7 °F (37.1 °C)  Min: 97.9 °F (36.6 °C)  Max: 99.9 °F (37.7 °C)  Respirations: Resp  Av.7  Min: 17  Max: 18  Pulse: Pulse  Av.4  Min: 74  Max: 97  Blood

## 2024-05-26 NOTE — PROGRESS NOTES
Patient resting comfortably in bed.  No pain as long she is not moving.  She has The knee immobilizer in place.      Knee immobilizer in place.  Negative Laura.  Dressing with minimal strikethrough.        Postoperative day #3 status post open treatment left femoral shaft fracture, periprosthetic          Continue current orthopedic care  Orthopedic team signing off for now please call with any questions or concerns      Nonweightbearing to left lower extremity.  Knee immobilizer at all times  Follow-up as an outpatient with orthopedics

## 2024-05-26 NOTE — PLAN OF CARE
Problem: Pain  Goal: Verbalizes/displays adequate comfort level or baseline comfort level  5/26/2024 1722 by Brandi Narayanan RN  Outcome: Progressing  5/26/2024 0508 by Roberto Messina RN  Outcome: Progressing     Problem: Safety - Adult  Goal: Free from fall injury  5/26/2024 1722 by Brandi Narayanan RN  Outcome: Progressing  5/26/2024 0508 by Roberto Messina RN  Outcome: Progressing     Problem: Discharge Planning  Goal: Discharge to home or other facility with appropriate resources  5/26/2024 1722 by Brandi Narayanan RN  Outcome: Progressing  5/26/2024 0508 by Roberto Messina RN  Outcome: Progressing

## 2024-05-27 LAB
ANION GAP SERPL CALCULATED.3IONS-SCNC: 9 MEQ/L (ref 9–15)
BUN SERPL-MCNC: 13 MG/DL (ref 8–23)
CALCIUM SERPL-MCNC: 8.1 MG/DL (ref 8.5–9.9)
CHLORIDE SERPL-SCNC: 105 MEQ/L (ref 95–107)
CO2 SERPL-SCNC: 28 MEQ/L (ref 20–31)
CREAT SERPL-MCNC: 0.51 MG/DL (ref 0.5–0.9)
ERYTHROCYTE [DISTWIDTH] IN BLOOD BY AUTOMATED COUNT: 14.1 % (ref 11.5–14.5)
GLUCOSE SERPL-MCNC: 108 MG/DL (ref 70–99)
HCT VFR BLD AUTO: 24 % (ref 37–47)
HGB BLD-MCNC: 7.9 G/DL (ref 12–16)
MAGNESIUM SERPL-MCNC: 2.1 MG/DL (ref 1.7–2.4)
MCH RBC QN AUTO: 31 PG (ref 27–31.3)
MCHC RBC AUTO-ENTMCNC: 32.9 % (ref 33–37)
MCV RBC AUTO: 94.1 FL (ref 79.4–94.8)
PHOSPHATE SERPL-MCNC: 3 MG/DL (ref 2.3–4.8)
PLATELET # BLD AUTO: 236 K/UL (ref 130–400)
POTASSIUM SERPL-SCNC: 4 MEQ/L (ref 3.4–4.9)
RBC # BLD AUTO: 2.55 M/UL (ref 4.2–5.4)
SODIUM SERPL-SCNC: 142 MEQ/L (ref 135–144)
WBC # BLD AUTO: 7.8 K/UL (ref 4.8–10.8)

## 2024-05-27 PROCEDURE — 6360000002 HC RX W HCPCS: Performed by: NURSE PRACTITIONER

## 2024-05-27 PROCEDURE — 80048 BASIC METABOLIC PNL TOTAL CA: CPT

## 2024-05-27 PROCEDURE — 97116 GAIT TRAINING THERAPY: CPT

## 2024-05-27 PROCEDURE — 97535 SELF CARE MNGMENT TRAINING: CPT

## 2024-05-27 PROCEDURE — 85027 COMPLETE CBC AUTOMATED: CPT

## 2024-05-27 PROCEDURE — 6370000000 HC RX 637 (ALT 250 FOR IP)

## 2024-05-27 PROCEDURE — 84100 ASSAY OF PHOSPHORUS: CPT

## 2024-05-27 PROCEDURE — 83735 ASSAY OF MAGNESIUM: CPT

## 2024-05-27 PROCEDURE — 36415 COLL VENOUS BLD VENIPUNCTURE: CPT

## 2024-05-27 PROCEDURE — 2580000003 HC RX 258: Performed by: NURSE PRACTITIONER

## 2024-05-27 PROCEDURE — 6370000000 HC RX 637 (ALT 250 FOR IP): Performed by: PHYSICIAN ASSISTANT

## 2024-05-27 PROCEDURE — 1210000000 HC MED SURG R&B

## 2024-05-27 PROCEDURE — 99233 SBSQ HOSP IP/OBS HIGH 50: CPT | Performed by: PHYSICIAN ASSISTANT

## 2024-05-27 RX ORDER — BISACODYL 10 MG
10 SUPPOSITORY, RECTAL RECTAL DAILY PRN
Status: DISCONTINUED | OUTPATIENT
Start: 2024-05-27 | End: 2024-05-29 | Stop reason: HOSPADM

## 2024-05-27 RX ORDER — POLYETHYLENE GLYCOL 3350 17 G/17G
17 POWDER, FOR SOLUTION ORAL 3 TIMES DAILY
Status: DISCONTINUED | OUTPATIENT
Start: 2024-05-27 | End: 2024-05-27

## 2024-05-27 RX ORDER — POLYETHYLENE GLYCOL 3350 17 G/17G
17 POWDER, FOR SOLUTION ORAL 2 TIMES DAILY
Status: DISCONTINUED | OUTPATIENT
Start: 2024-05-28 | End: 2024-05-29 | Stop reason: HOSPADM

## 2024-05-27 RX ADMIN — POTASSIUM & SODIUM PHOSPHATES POWDER PACK 280-160-250 MG 250 MG: 280-160-250 PACK at 06:18

## 2024-05-27 RX ADMIN — ACETAMINOPHEN 325MG 650 MG: 325 TABLET ORAL at 00:24

## 2024-05-27 RX ADMIN — ENOXAPARIN SODIUM 30 MG: 100 INJECTION SUBCUTANEOUS at 21:22

## 2024-05-27 RX ADMIN — ENOXAPARIN SODIUM 30 MG: 100 INJECTION SUBCUTANEOUS at 09:12

## 2024-05-27 RX ADMIN — Medication 10 ML: at 21:13

## 2024-05-27 RX ADMIN — POTASSIUM & SODIUM PHOSPHATES POWDER PACK 280-160-250 MG 250 MG: 280-160-250 PACK at 00:24

## 2024-05-27 RX ADMIN — Medication 10 ML: at 09:13

## 2024-05-27 RX ADMIN — CYANOCOBALAMIN TAB 500 MCG 1000 MCG: 500 TAB at 09:11

## 2024-05-27 RX ADMIN — METHOCARBAMOL TABLETS 500 MG: 500 TABLET, COATED ORAL at 14:07

## 2024-05-27 RX ADMIN — METHOCARBAMOL TABLETS 500 MG: 500 TABLET, COATED ORAL at 09:11

## 2024-05-27 RX ADMIN — ACETAMINOPHEN 325MG 650 MG: 325 TABLET ORAL at 06:18

## 2024-05-27 RX ADMIN — SENNOSIDES 8.6 MG: 8.6 TABLET, FILM COATED ORAL at 21:19

## 2024-05-27 RX ADMIN — METHOCARBAMOL TABLETS 500 MG: 500 TABLET, COATED ORAL at 21:12

## 2024-05-27 RX ADMIN — CEPHALEXIN 250 MG: 250 CAPSULE ORAL at 18:41

## 2024-05-27 RX ADMIN — PRAVASTATIN SODIUM 40 MG: 40 TABLET ORAL at 21:12

## 2024-05-27 RX ADMIN — POTASSIUM CHLORIDE 10 MEQ: 750 TABLET, FILM COATED, EXTENDED RELEASE ORAL at 21:13

## 2024-05-27 ASSESSMENT — PAIN SCALES - GENERAL
PAINLEVEL_OUTOF10: 0
PAINLEVEL_OUTOF10: 0

## 2024-05-27 NOTE — PROGRESS NOTES
Physical Therapy Med Surg Daily Treatment Note  Facility/Department: [facility] 2W ORTHO TELE  Room: [place] [address]       NAME: Terri Sanchez  : 1945 (78 y.o.)  MRN: [id number]  CODE STATUS: Full Code    Date of Service: 2024    Patient Diagnosis(es): Closed displaced spiral fracture of shaft of left femur, initial encounter ([facility]) [S72.342A]   Chief Complaint   Patient presents with    Fall    Leg Pain     Pt fell and hurt her left leg       Leg Injury     Patient Active Problem List    Diagnosis Date Noted    Closed displaced spiral fracture of shaft of left femur, initial encounter ([facility]) 2024    BV (bacterial vaginosis) 2018    Katy-Walker grade 3 cystocele     MATTY (stress urinary incontinence, female)     Uterine prolapse 10/04/2018    Complete uterine prolapse 2018    Katy-Walker grade 4 cystocele 2018    Katy-Walker grade 2 rectocele 2018    Recurrent UTI 2018    Rectal bleed 2018    Colitis 2018        Past Medical History:   Diagnosis Date    Hyperlipidemia     meds > 5 yrs    Hypertension     meds > 5 yrs    Osteoarthritis     Prolapsed uterus      Past Surgical History:   Procedure Laterality Date    BREAST SURGERY Right     fibrocystic breast /Bx / benign    COLONOSCOPY      ENDOSCOPY, COLON, DIAGNOSTIC      EYE SURGERY      phaco with IOL OU.    FIBULA FRACTURE SURGERY Left 2024    LEFT DISTAL FEMUR OPEN REDUCTION INTERNAL FIXATION performed by Sascha Coelho MD at [facility] OR    FRACTURE SURGERY Right     due to fracture / had skeletal hardware    [place] COLONOSCOPY W/BIOPSY SINGLE/MULTIPLE N/A 4/10/2018    COLONOSCOPY WITH BIOPSYS performed by Conrad Ward MD at [facility] OR    [place] DELIGATION URETER N/A 10/4/2018    USLS/ SSLS performed by Senia Antonio MD at [facility] OR    KY LAPS W/VAGINAL HYSTERECTOMY > 250 GRAMS N/A 10/4/2018    LAPARSCOPIC HYSTERECTOMY performed by Senia Antonio MD at [facility] OR    VAGINA SURGERY N/A 10/4/2018    A-P REPAIR WITH TOT  OBTRYX and SLING , CYSTOSCOPY performed by Senia Antonio MD at [facility] OR            Restrictions:fall risk. Nwb left leg       SUBJECTIVE:   Subjective: \"I feel like today I've turned a corner\"    Pain  Pain: 5/10 pain in left leg.  Pt reports pain meds earlier  OBJECTIVE:   Orientation  Overall Orientation Status: Within Functional Limits  Cognition  Overall Cognitive Status: WFL    Bed Mobility Training  Bed Mobility Training: Yes  Overall Level of Assistance: Minimum assistance  Interventions: Safety awareness training;Verbal cues;Manual cues  Supine to Sit: Minimum assistance;Stand-by assistance  Scooting: Stand-by assistance;Minimum assistance    Transfer Training  Transfer Training: Yes  Overall Level of Assistance: Moderate assistance;Assist X2  Interventions: Safety awareness training;Verbal cues;Manual cues  Sit to Stand: Moderate assistance;Assist X2  Stand to Sit: Moderate assistance;Assist X2    Overall Level of Assistance: Moderate assistance;Assist X2  Distance (ft): 3 Feet  Assistive Device: Walker, rolling  Interventions: Safety awareness training;Verbal cues;Manual cues  Base of Support: Widened  Speed/Laurence: Fluctuations  Step Length: Right shortened  Stance: Right decreased  Gait Abnormalities: Decreased step clearance  Right Side Weight Bearing: As tolerated  Left Side Weight Bearing: Non-weight bearing       PM tx: 12:55  Pt up in chair and agreeable to getting back to bed.   Son Grant present.   Pt reports tightness in posterior left leg.    Transfer: low pivot transfer from chair to bed: max assistance.   Pt unable to keep weight off of left leg when performing sit to stand transfer at this time.   Sit to supine: min assist with left leg into bed.                                   ASSESSMENT pt with decreased ability to keep wb off of left leg with assistance. Pt able to transfer short distance to chair from bed. Notified staff that pt is up in chair at this time and PT returning after lunch

## 2024-05-27 NOTE — PROGRESS NOTES
TRAUMA DAILY PROGRESS NOTE      Patient Name: Terri Sanchez  Admission Date 5/22/2024    Hospital Day: 5  Patient seen and examined on 5/27/2024    INTERVAL HISTORY/EVENTS     Background:  Terri Sanchez is a 78 y.o. female with a PMHx of colitis, rectal bleed, MATTY, recurrent UTI, and a humerus fracture 2 months ago, as well as previous left femur carmelita and HTN who presents with spiral fracture to her left femur.  She says she was up on her tip toes getting something from a shelf when her foot slipped and she fell over onto her hip.  - head strike, - LOC. She has had multiple falls over the past few years, including falling stepping off of a curb in 2019 resulting in her hip fracture that was operated on, as well as slipping on black ice.  She is otherwise in good health and active.  She could walk up 2 flights of stairs without difficulty prior to her fracture.    Trauma workup revealed a left mid-distal femur fx. Patient admitted to [facility] under trauma service while awaiting operative intervention of left femur fx.       Hospital Course:  5/22/2024: S/p MFFS. Left distal radius fx. Admitted to [facility].  5/23/2024: S/p left TFN.   5/24/2024: Hgb drop to 6.9. 1uPRBC transfused  5/25/2024: Hgb stabilized at 7.4 -> 7.6. HDS throughout the day.  5/26/2024: Hgb uptrend to 7.9 (7.6). HDS.       24 Hour Events:  No acute overnight events. Patient with minimal pain to LLE. Adequate pain control on current regimen. Remains in KI. Improving PO fluids intake with diluted orange juice per patient preference. Tolerates PO. Voiding spontaneously. No BM. Declines MoM but agreeable to increased miralax.    Vitals: afebrile. Hypotensive pressure recorded yesterday afternoon at systolic 86. Overnight and this AM, MAPs >65. SPO2 86683% on RA.    Labs: no leukocytosis at 7.8. H/H uptrend to 7.9/24.0. BUN/Cr WNL. Electrolytes WNL.    UOP: 1400mL documented    BM: x1 (last BM 5/27)      PHYSICAL EXAM     Vitals Average, Min and Max for last  24 hours:  Temp: Temp: 98.8 °F (37.1 °C); Temp  Av.5 °F (36.9 °C)  Min: 98.1 °F (36.7 °C)  Max: 98.8 °F (37.1 °C)  Respirations: Resp  Av.3  Min: 16  Max: 20  Pulse: Pulse  Av.9  Min: 84  Max: 105  Blood Pressure: Systolic (24hrs), Av , Min:88 , Max:118   ; Diastolic (24hrs), Av, Min:42, Max:83    SpO2: SpO2  Av.5 %  Min: 93 %  Max: 99 %    24hr Intake/Output:   Intake/Output Summary (Last 24 hours) at 2024 1342  Last data filed at 2024 0310  Gross per 24 hour   Intake 240 ml   Output 1400 ml   Net -1160 ml       Vitals: /83   Pulse (!) 105   Temp 98.8 °F (37.1 °C) (Oral)   Resp 16   Ht 1.524 m (5')   Wt 56.7 kg (125 lb)   LMP 1996   SpO2 96%   BMI 24.41 kg/m²     Physical Exam:  Constitutional: NAD. Well-appearing. Laying in bed.   HEENT: Atraumatic normocephalic.   Cardiovascular: Regular rate and rhythm. +2 left DP pulse.   Pulmonary: Non-labored breathing on RA  Abdominal: Soft. Non-distended. Non-tender.   Musculoskeletal: Good ROM in all extremities except LLE. LLE with Aquacel in place. Moderate strike through, stable. Left thigh firm/swollen, but compartments remain soft and compressible.   Neurological: Alert, awake, and orientated x 3. Motor and sensory grossly intact. No focal deficits. GCS of 15.   Skin: Pale. Warm and moist.     LABORATORY RESULTS (LAST 24 HOURS)     CBC with Differential:    Lab Results   Component Value Date/Time    WBC 7.8 2024 05:54 AM    RBC 2.55 2024 05:54 AM    HGB 7.9 2024 05:54 AM    HCT 24.0 2024 05:54 AM     2024 05:54 AM    MCV 94.1 2024 05:54 AM    MCH 31.0 2024 05:54 AM    MCHC 32.9 2024 05:54 AM    RDW 14.1 2024 05:54 AM    LYMPHOPCT 18.0 2024 06:08 PM    MONOPCT 12.0 2024 06:08 PM    EOSPCT 0.0 2024 06:08 PM    BASOPCT 0.1 2024 06:08 PM    MONOSABS 1.5 2024 06:08 PM    EOSABS 0.0 2024 06:08 PM    BASOSABS 0.0

## 2024-05-28 ENCOUNTER — APPOINTMENT (OUTPATIENT)
Dept: PHYSICAL THERAPY | Age: 79
End: 2024-05-28
Payer: MEDICARE

## 2024-05-28 PROCEDURE — 6370000000 HC RX 637 (ALT 250 FOR IP)

## 2024-05-28 PROCEDURE — 99232 SBSQ HOSP IP/OBS MODERATE 35: CPT | Performed by: PHYSICIAN ASSISTANT

## 2024-05-28 PROCEDURE — 97535 SELF CARE MNGMENT TRAINING: CPT

## 2024-05-28 PROCEDURE — 2580000003 HC RX 258: Performed by: NURSE PRACTITIONER

## 2024-05-28 PROCEDURE — 6370000000 HC RX 637 (ALT 250 FOR IP): Performed by: PHYSICIAN ASSISTANT

## 2024-05-28 PROCEDURE — 6360000002 HC RX W HCPCS: Performed by: NURSE PRACTITIONER

## 2024-05-28 PROCEDURE — 1210000000 HC MED SURG R&B

## 2024-05-28 RX ADMIN — METHOCARBAMOL TABLETS 500 MG: 500 TABLET, COATED ORAL at 08:58

## 2024-05-28 RX ADMIN — DILTIAZEM HYDROCHLORIDE 240 MG: 240 CAPSULE, EXTENDED RELEASE ORAL at 09:00

## 2024-05-28 RX ADMIN — POLYETHYLENE GLYCOL 3350 17 G: 17 POWDER, FOR SOLUTION ORAL at 08:58

## 2024-05-28 RX ADMIN — Medication 10 ML: at 20:38

## 2024-05-28 RX ADMIN — CYANOCOBALAMIN TAB 500 MCG 1000 MCG: 500 TAB at 08:59

## 2024-05-28 RX ADMIN — PRAVASTATIN SODIUM 40 MG: 40 TABLET ORAL at 20:30

## 2024-05-28 RX ADMIN — SENNOSIDES 8.6 MG: 8.6 TABLET, FILM COATED ORAL at 20:31

## 2024-05-28 RX ADMIN — Medication 10 ML: at 08:58

## 2024-05-28 RX ADMIN — ENOXAPARIN SODIUM 30 MG: 100 INJECTION SUBCUTANEOUS at 20:31

## 2024-05-28 RX ADMIN — CEPHALEXIN 250 MG: 250 CAPSULE ORAL at 17:41

## 2024-05-28 RX ADMIN — ENOXAPARIN SODIUM 30 MG: 100 INJECTION SUBCUTANEOUS at 08:58

## 2024-05-28 RX ADMIN — METHOCARBAMOL TABLETS 500 MG: 500 TABLET, COATED ORAL at 20:31

## 2024-05-28 RX ADMIN — POTASSIUM CHLORIDE 10 MEQ: 750 TABLET, FILM COATED, EXTENDED RELEASE ORAL at 20:30

## 2024-05-28 RX ADMIN — POLYETHYLENE GLYCOL 3350 17 G: 17 POWDER, FOR SOLUTION ORAL at 20:31

## 2024-05-28 RX ADMIN — HYDROCHLOROTHIAZIDE 12.5 MG: 25 TABLET ORAL at 08:59

## 2024-05-28 RX ADMIN — METHOCARBAMOL TABLETS 500 MG: 500 TABLET, COATED ORAL at 14:08

## 2024-05-28 RX ADMIN — POTASSIUM CHLORIDE 10 MEQ: 750 TABLET, FILM COATED, EXTENDED RELEASE ORAL at 08:59

## 2024-05-28 ASSESSMENT — PAIN SCALES - GENERAL
PAINLEVEL_OUTOF10: 0
PAINLEVEL_OUTOF10: 0

## 2024-05-28 NOTE — PROGRESS NOTES
Physical Therapy Med Surg Daily Treatment Note  Facility/Department: [facility] 2W ORTHO TELE  Room: [place] [address]       NAME: Terri Sanchez  : 1945 (78 y.o.)  MRN: [id number]  CODE STATUS: Full Code    Date of Service: 2024    Patient Diagnosis(es): Closed displaced spiral fracture of shaft of left femur, initial encounter ([facility]) [S72.342A]   Chief Complaint   Patient presents with    Fall    Leg Pain     Pt fell and hurt her left leg       Leg Injury     Patient Active Problem List    Diagnosis Date Noted    Closed displaced spiral fracture of shaft of left femur, initial encounter ([facility]) 2024    BV (bacterial vaginosis) 2018    Saxon-Walker grade 3 cystocele     MATTY (stress urinary incontinence, female)     Uterine prolapse 10/04/2018    Complete uterine prolapse 2018    Saxon-Walker grade 4 cystocele 2018    Saxon-Walker grade 2 rectocele 2018    Recurrent UTI 2018    Rectal bleed 2018    Colitis 2018        Past Medical History:   Diagnosis Date    Hyperlipidemia     meds > 5 yrs    Hypertension     meds > 5 yrs    Osteoarthritis     Prolapsed uterus      Past Surgical History:   Procedure Laterality Date    BREAST SURGERY Right     fibrocystic breast /Bx / benign    COLONOSCOPY      ENDOSCOPY, COLON, DIAGNOSTIC      EYE SURGERY      phaco with IOL OU.    FIBULA FRACTURE SURGERY Left 2024    LEFT DISTAL FEMUR OPEN REDUCTION INTERNAL FIXATION performed by Sascha Coelho MD at [facility] OR    FRACTURE SURGERY Right     due to fracture / had skeletal hardware    ME COLONOSCOPY W/BIOPSY SINGLE/MULTIPLE N/A 4/10/2018    COLONOSCOPY WITH BIOPSYS performed by Conrad Ward MD at [facility] OR    ME DELIGATION URETER N/A 10/4/2018    USLS/ SSLS performed by Senia Antonio MD at [facility] OR    ME LAPS W/VAGINAL HYSTERECTOMY > 250 GRAMS N/A 10/4/2018    LAPARSCOPIC HYSTERECTOMY performed by Senia Antonio MD at [facility] OR    VAGINA SURGERY N/A 10/4/2018    A-P REPAIR WITH TOT

## 2024-05-28 NOTE — DISCHARGE INSTR - COC
Continuity of Care Form    Patient Name: Terri Sanchez   :  1945  MRN:  [id number]    Admit date:  2024  Discharge date:  2024    Code Status Order: Full Code   Advance Directives:   Advance Care Flowsheet Documentation       Date/Time Healthcare Directive Type of Healthcare Directive Copy in Chart Healthcare Agent Appointed Healthcare Agent's Name Healthcare Agent's Phone Number    24 1245 No, patient does not have an advance directive for healthcare treatment -- -- -- -- --            Admitting Physician:  Huang Garcia MD  PCP: Tacho Otto MD    Discharging Nurse: MT  Discharging Hospital Unit/Room#: W277/W277-01  Discharging Unit Phone Number: [phone]    Emergency Contact:   Extended Emergency Contact Information  Primary Emergency Contact: Danilo Sanchez  Home Phone: [phone]  Relation: Child    Past Surgical History:  Past Surgical History:   Procedure Laterality Date    BREAST SURGERY Right     fibrocystic breast /Bx / benign    COLONOSCOPY      ENDOSCOPY, COLON, DIAGNOSTIC      EYE SURGERY      phaco with IOL OU.    FIBULA FRACTURE SURGERY Left 2024    LEFT DISTAL FEMUR OPEN REDUCTION INTERNAL FIXATION performed by Sascha Coelho MD at [facility] OR    FRACTURE SURGERY Right     due to fracture / had skeletal hardware    [place] COLONOSCOPY W/BIOPSY SINGLE/MULTIPLE N/A 4/10/2018    COLONOSCOPY WITH BIOPSYS performed by Conrad Ward MD at [facility] OR    [place] DELIGATION URETER N/A 10/4/2018    USLS/ SSLS performed by Senia Antonio MD at [facility] OR    [place] LAPS W/VAGINAL HYSTERECTOMY > 250 GRAMS N/A 10/4/2018    LAPARSCOPIC HYSTERECTOMY performed by Senia Antonio MD at [facility] OR    VAGINA SURGERY N/A 10/4/2018    A-P REPAIR WITH TOT OBTRYX and SLING , CYSTOSCOPY performed by Senia Antonio MD at [facility] OR       Immunization History:   Immunization History   Administered Date(s) Administered    COVID-19, MODERNA Bivalent, (age 12y+), IM, 50 mcg/0.5 mL 10/29/2022    COVID-19, MODERNA,  Electronically signed by ALFREDO Peralta on 5/28/24 at 3:58 PM EDT    PHYSICIAN SECTION    Prognosis: Good    Condition at Discharge: Stable    Rehab Potential (if transferring to Rehab): Good    Recommended Labs or Other Treatments After Discharge:   - Per trauma program protocol, patient DOES REQUIRE post-discharge VTE prophylaxis. Patient will require Lovenox 30mg BID for 6 weeks until 7/10/2024.  - Remove Aquacel 5/30/2024  - Follow up in 2 weeks with orthopedics (Dr. Sascha Coelho)    Physician Certification: I certify the above information and transfer of Terri Sanchez  is necessary for the continuing treatment of the diagnosis listed and that she requires Skilled Nursing Facility for less 30 days.     Update Admission H&P: No change in H&P    PHYSICIAN ASSISTANT SIGNATURE:  Electronically signed by Marilu Livingston PA-C on 5/29/24 at 4:15 PM EDT

## 2024-05-28 NOTE — PROGRESS NOTES
TRAUMA DAILY PROGRESS NOTE      Patient Name: Terri Sanchez  Admission Date 2024    Hospital Day: 6  Patient seen and examined on 2024    INTERVAL HISTORY/EVENTS     Background:  Terri Sanchez is a 78 y.o. female with a PMHx of colitis, rectal bleed, MATTY, recurrent UTI, and a humerus fracture 2 months ago, as well as previous left femur carmelita and HTN who presents with spiral fracture to her left femur.  She says she was up on her tip toes getting something from a shelf when her foot slipped and she fell over onto her hip.  - head strike, - LOC. She has had multiple falls over the past few years, including falling stepping off of a curb in 2019 resulting in her hip fracture that was operated on, as well as slipping on black ice.  She is otherwise in good health and active.  She could walk up 2 flights of stairs without difficulty prior to her fracture.    Trauma workup revealed a left mid-distal femur fx. Patient admitted to [facility] under trauma service while awaiting operative intervention of left femur fx.       Hospital Course:  2024: S/p MFFS. Left distal radius fx. Admitted to [facility].  2024: S/p left TFN.   2024: Hgb drop to 6.9. 1uPRBC transfused  2024: Hgb stabilized at 7.4 -> 7.6. HDS throughout the day.  2024: Hgb uptrend to 7.9 (7.6). HDS.   2024: Hgb stable. HDS. Improvement with PT/OT      24 Hour Events:  No acute overnight events. Patient doing well with PT/OT and feels like she has \"turned a corner\". Patient with minimal pain to LLE. Adequate pain control on current regimen. Remains in KI. Tolerates PO, adequate intake. Voiding spontaneously. Passed BM.    Vitals: afebrile. VSS on RA.    Labs: No new labs today    UOP: 1250mL    BM: x1 (last BM )      PHYSICAL EXAM     Vitals Average, Min and Max for last 24 hours:  Temp: Temp: 99.3 °F (37.4 °C); Temp  Av.8 °F (37.1 °C)  Min: 98.4 °F (36.9 °C)  Max: 99.3 °F (37.4 °C)  Respirations: Resp  Av  Min: 16   Max: 18  Pulse: Pulse  Av.5  Min: 99  Max: 112  Blood Pressure: Systolic (24hrs), Av , Min:101 , Max:113   ; Diastolic (24hrs), Av, Min:47, Max:55    SpO2: SpO2  Av.3 %  Min: 96 %  Max: 98 %    24hr Intake/Output:   Intake/Output Summary (Last 24 hours) at 2024 1528  Last data filed at 2024 0534  Gross per 24 hour   Intake 245 ml   Output 1250 ml   Net -1005 ml       Vitals: BP (!) 113/47   Pulse 99   Temp 99.3 °F (37.4 °C) (Oral)   Resp 18   Ht 1.524 m (5')   Wt 56.7 kg (125 lb)   LMP 1996   SpO2 98%   BMI 24.41 kg/m²     Physical Exam:  Constitutional: NAD. Well-appearing. Laying in bed. In good spirits  HEENT: Atraumatic normocephalic.   Cardiovascular: Regular rate and rhythm. +2 left DP pulse.   Pulmonary: Non-labored breathing on RA  Abdominal: Soft. Non-distended. Non-tender.   Musculoskeletal: Good ROM in all extremities except LLE. LLE with Aquacel in place. Moderate strike through, stable. Left thigh firm/swollen, but compartments remain soft and compressible.   Neurological: Alert, awake, and orientated x 3. Motor and sensory grossly intact. No focal deficits. GCS of 15.   Skin: Pale. Warm and moist.     LABORATORY RESULTS (LAST 24 HOURS)     CBC with Differential:    Lab Results   Component Value Date/Time    WBC 7.8 2024 05:54 AM    RBC 2.55 2024 05:54 AM    HGB 7.9 2024 05:54 AM    HCT 24.0 2024 05:54 AM     2024 05:54 AM    MCV 94.1 2024 05:54 AM    MCH 31.0 2024 05:54 AM    MCHC 32.9 2024 05:54 AM    RDW 14.1 2024 05:54 AM    LYMPHOPCT 18.0 2024 06:08 PM    MONOPCT 12.0 2024 06:08 PM    EOSPCT 0.0 2024 06:08 PM    BASOPCT 0.1 2024 06:08 PM    MONOSABS 1.5 2024 06:08 PM    EOSABS 0.0 2024 06:08 PM    BASOSABS 0.0 2024 06:08 PM     CMP:    Lab Results   Component Value Date/Time     2024 05:54 AM    K 4.0 2024 05:54 AM    K 3.3 04/10/2018

## 2024-05-28 NOTE — PROGRESS NOTES
[facility] OCCUPATIONAL THERAPY MED SURG TREATMENT NOTE     Date: 2024  Patient Name: Terri Sanchez        MRN: [id number]  Account: [id number]   : 1945  (78 y.o.)  Room: [place] [address]    Chart Review:    Restrictions  Restrictions/Precautions  Restrictions/Precautions: Fall Risk, Weight Bearing, Other (Knee Immobilizer)  Required Braces or Orthoses?: Yes   Upper Extremity Weight Bearing Restrictions  Left Upper Extremity Weight Bearing: Weight Bearing As Tolerated  Lower Extremity Weight Bearing Restrictions  Left Lower Extremity Weight Bearing: Non Weight Bearing  Comment: Knee immobilizer at all times out of bed, no ROM knee     Safety:  Safety Devices  Type of Devices: All fall risk precautions in place;Bed alarm in place;Call light within reach;Left in bed;Nurse notified    Patient's birthday verified: Yes    Subjective:    Pain   Pain at start of treatment: No    Pain at end of treatment: No      Objective: Pt in bed resting upon arrival. Pt agreeable to transferring to EOB to wash up. Pt completed as follows.       Bed Mobility  Supine to Sit: Minimal assistance (Assistance to bring LLE OOB)  Sit to Supine: Moderate assistance (Assistance to bring BLEs into bed)  Bed Mobility Comments: HOB flat, Heavy use of bed rails    ADL Status:  Grooming: Setup;Supervision  Grooming Skilled Clinical Factors: Pt completed oral care, hair care, and washed face  UE Bathing: Setup;Supervision  LE Bathing: Minimal assistance  LE Bathing Skilled Clinical Factors: Pt washed legs only reporting that parker areas are clean. Assistace to wash lower legs/feet  UE Dressing: Unable to assess  UE Dressing Skilled Clinical Factors: Hospital Gown Only  LE Dressing: Dependent/Total  LE Dressing Skilled Clinical Factors: To don bilateral socks    Skin Care: Soap and water    Transfers:  Sit to stand: Minimal assistance  Stand to sit: Moderate assistance (For slow, controlled descent)  Stand Pivot: Maximal Assistance (Pt  upper body clothing?: A Little  How much help is needed for taking care of personal grooming?: A Little  How much help for eating meals?: None  AM-PAC Inpatient Daily Activity Raw Score: 16  AM-PAC Inpatient ADL T-Scale Score : 35.96  ADL Inpatient CMS 0-100% Score: 53.32  ADL Inpatient CMS G-Code Modifier : CK      Plan:  Continue OT per POC      Patient Education:  Patient Education  Education Given To: Patient  Education Provided: Role of Therapy;Plan of Care;Transfer Training  Education Method: Verbal  Barriers to Learning: None  Education Outcome: Verbalized understanding;Demonstrated understanding;Continued education needed    Equipment recommendations:  Continue to assess pending progress.     Goals/Plan of care addressed during this session:  Patient Goal: Patient goals : \"I want to move well enough to go home\"  Improve [place] with ADLs and Improve [place] with Functional Transfers    Therapy Time:   Individual Group Co-Treat   Time In 1419       Time Out 1457         Minutes 38           ADL/IADL trainin minutes    Electronically signed by:    CHAN Carmen    2024, 3:23 PM

## 2024-05-28 NOTE — PROGRESS NOTES
Physical Therapy Med Surg Daily Treatment Note  Facility/Department: [facility] 2W ORTHO TELE  Room: [place] [address]       NAME: Terri Sanchez  : 1945 (78 y.o.)  MRN: [id number]  CODE STATUS: Full Code    Date of Service: 2024    Patient Diagnosis(es): Closed displaced spiral fracture of shaft of left femur, initial encounter ([facility]) [S72.342A]   Chief Complaint   Patient presents with    Fall    Leg Pain     Pt fell and hurt her left leg       Leg Injury     Patient Active Problem List    Diagnosis Date Noted    Closed displaced spiral fracture of shaft of left femur, initial encounter ([facility]) 2024    BV (bacterial vaginosis) 2018    Towner-Walker grade 3 cystocele     MATTY (stress urinary incontinence, female)     Uterine prolapse 10/04/2018    Complete uterine prolapse 2018    Towner-Walker grade 4 cystocele 2018    Towner-Walker grade 2 rectocele 2018    Recurrent UTI 2018    Rectal bleed 2018    Colitis 2018        Past Medical History:   Diagnosis Date    Hyperlipidemia     meds > 5 yrs    Hypertension     meds > 5 yrs    Osteoarthritis     Prolapsed uterus      Past Surgical History:   Procedure Laterality Date    BREAST SURGERY Right     fibrocystic breast /Bx / benign    COLONOSCOPY      ENDOSCOPY, COLON, DIAGNOSTIC      EYE SURGERY      phaco with IOL OU.    FIBULA FRACTURE SURGERY Left 2024    LEFT DISTAL FEMUR OPEN REDUCTION INTERNAL FIXATION performed by Sascha Coelho MD at [facility] OR    FRACTURE SURGERY Right     due to fracture / had skeletal hardware    NE COLONOSCOPY W/BIOPSY SINGLE/MULTIPLE N/A 4/10/2018    COLONOSCOPY WITH BIOPSYS performed by Conrad Ward MD at [facility] OR    NE DELIGATION URETER N/A 10/4/2018    USLS/ SSLS performed by Senia Antonio MD at [facility] OR    NE LAPS W/VAGINAL HYSTERECTOMY > 250 GRAMS N/A 10/4/2018    LAPARSCOPIC HYSTERECTOMY performed by Senia Antonio MD at [facility] OR    VAGINA SURGERY N/A 10/4/2018    A-P REPAIR WITH TOT

## 2024-05-28 NOTE — CARE COORDINATION
Per trauma pt is medically cleared for discharge once precert obtained.   Per IKERW O'Peter Quinault asked for updates today, we are still waiting on precert.

## 2024-05-29 VITALS
OXYGEN SATURATION: 97 % | HEIGHT: 60 IN | BODY MASS INDEX: 24.54 KG/M2 | HEART RATE: 98 BPM | TEMPERATURE: 97.9 F | SYSTOLIC BLOOD PRESSURE: 109 MMHG | DIASTOLIC BLOOD PRESSURE: 58 MMHG | RESPIRATION RATE: 20 BRPM | WEIGHT: 125 LBS

## 2024-05-29 PROBLEM — S42.232D: Status: ACTIVE | Noted: 2024-05-29
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
## 2024-05-29 PROBLEM — G89.18 ACUTE POST-OPERATIVE PAIN: Status: ACTIVE | Noted: 2024-05-29

## 2024-05-29 PROBLEM — D62 ACUTE BLOOD LOSS ANEMIA: Status: ACTIVE | Noted: 2024-05-29

## 2024-05-29 PROBLEM — G89.11 ACUTE PAIN DUE TO TRAUMA: Status: ACTIVE | Noted: 2024-05-29

## 2024-05-29 PROCEDURE — 99239 HOSP IP/OBS DSCHRG MGMT >30: CPT

## 2024-05-29 PROCEDURE — 97535 SELF CARE MNGMENT TRAINING: CPT

## 2024-05-29 PROCEDURE — 6370000000 HC RX 637 (ALT 250 FOR IP): Performed by: PHYSICIAN ASSISTANT

## 2024-05-29 PROCEDURE — 6370000000 HC RX 637 (ALT 250 FOR IP)

## 2024-05-29 PROCEDURE — 2580000003 HC RX 258: Performed by: NURSE PRACTITIONER

## 2024-05-29 PROCEDURE — 6360000002 HC RX W HCPCS: Performed by: NURSE PRACTITIONER

## 2024-05-29 RX ORDER — SENNOSIDES A AND B 8.6 MG/1
1 TABLET, FILM COATED ORAL NIGHTLY
Qty: 10 TABLET | Refills: 0 | DISCHARGE
Start: 2024-05-29 | End: 2024-06-08

## 2024-05-29 RX ORDER — ACETAMINOPHEN 325 MG/1
650 TABLET ORAL EVERY 6 HOURS
Qty: 120 TABLET | Refills: 0 | DISCHARGE
Start: 2024-05-29 | End: 2024-06-13

## 2024-05-29 RX ORDER — LIDOCAINE 4 G/G
1 PATCH TOPICAL DAILY
Qty: 10 EACH | Refills: 0 | DISCHARGE
Start: 2024-05-30 | End: 2024-06-09

## 2024-05-29 RX ORDER — OXYCODONE HYDROCHLORIDE 5 MG/1
2.5 TABLET ORAL EVERY 8 HOURS PRN
Qty: 5 TABLET | Refills: 0 | Status: SHIPPED | DISCHARGE
Start: 2024-05-29 | End: 2024-06-01

## 2024-05-29 RX ORDER — ENOXAPARIN SODIUM 100 MG/ML
30 INJECTION SUBCUTANEOUS 2 TIMES DAILY
Qty: 25.2 ML | Refills: 0 | DISCHARGE
Start: 2024-05-29 | End: 2024-07-10

## 2024-05-29 RX ORDER — OXYCODONE HYDROCHLORIDE 5 MG/1
2.5 TABLET ORAL EVERY 8 HOURS PRN
Qty: 5 TABLET | Refills: 0 | Status: SHIPPED | DISCHARGE
Start: 2024-05-29 | End: 2024-05-29

## 2024-05-29 RX ORDER — POLYETHYLENE GLYCOL 3350 17 G/17G
17 POWDER, FOR SOLUTION ORAL 2 TIMES DAILY
Qty: 20 PACKET | Refills: 0 | DISCHARGE
Start: 2024-05-29 | End: 2024-06-08

## 2024-05-29 RX ADMIN — CYANOCOBALAMIN TAB 500 MCG 1000 MCG: 500 TAB at 09:26

## 2024-05-29 RX ADMIN — POTASSIUM CHLORIDE 10 MEQ: 750 TABLET, FILM COATED, EXTENDED RELEASE ORAL at 09:27

## 2024-05-29 RX ADMIN — DILTIAZEM HYDROCHLORIDE 240 MG: 240 CAPSULE, EXTENDED RELEASE ORAL at 09:27

## 2024-05-29 RX ADMIN — HYDROCHLOROTHIAZIDE 12.5 MG: 25 TABLET ORAL at 09:26

## 2024-05-29 RX ADMIN — Medication 10 ML: at 09:27

## 2024-05-29 RX ADMIN — ENOXAPARIN SODIUM 30 MG: 100 INJECTION SUBCUTANEOUS at 09:25

## 2024-05-29 RX ADMIN — CEPHALEXIN 250 MG: 250 CAPSULE ORAL at 18:04

## 2024-05-29 RX ADMIN — POLYETHYLENE GLYCOL 3350 17 G: 17 POWDER, FOR SOLUTION ORAL at 09:24

## 2024-05-29 RX ADMIN — ACETAMINOPHEN 325MG 650 MG: 325 TABLET ORAL at 12:30

## 2024-05-29 RX ADMIN — ACETAMINOPHEN 325MG 650 MG: 325 TABLET ORAL at 18:04

## 2024-05-29 ASSESSMENT — PAIN SCALES - GENERAL
PAINLEVEL_OUTOF10: 0
PAINLEVEL_OUTOF10: 5

## 2024-05-29 ASSESSMENT — PAIN DESCRIPTION - ORIENTATION: ORIENTATION: LEFT

## 2024-05-29 ASSESSMENT — PAIN DESCRIPTION - LOCATION: LOCATION: LEG

## 2024-05-29 NOTE — PLAN OF CARE
Problem: Pain  Goal: Verbalizes/displays adequate comfort level or baseline comfort level  5/29/2024 1049 by Catrina Smith RN  Outcome: Progressing  5/29/2024 0520 by Roberto Messina RN  Outcome: Progressing     Problem: Safety - Adult  Goal: Free from fall injury  5/29/2024 1049 by Catrina Smith RN  Outcome: Progressing  5/29/2024 0520 by Roberto Messina RN  Outcome: Progressing     Problem: Discharge Planning  Goal: Discharge to home or other facility with appropriate resources  5/29/2024 1049 by Catrina Smith RN  Outcome: Progressing  5/29/2024 0520 by Roberto Messina RN  Outcome: Progressing     Problem: Skin/Tissue Integrity  Goal: Absence of new skin breakdown  Description: 1.  Monitor for areas of redness and/or skin breakdown  2.  Assess vascular access sites hourly  3.  Every 4-6 hours minimum:  Change oxygen saturation probe site  4.  Every 4-6 hours:  If on nasal continuous positive airway pressure, respiratory therapy assess nares and determine need for appliance change or resting period.  Outcome: Progressing

## 2024-05-29 NOTE — PLAN OF CARE
Problem: Pain  Goal: Verbalizes/displays adequate comfort level or baseline comfort level  5/29/2024 1639 by Catrina Smith RN  Outcome: Adequate for Discharge  5/29/2024 1049 by Catrina Smith RN  Outcome: Progressing  5/29/2024 0520 by Roebrto Messina RN  Outcome: Progressing     Problem: Safety - Adult  Goal: Free from fall injury  5/29/2024 1639 by Catrina Smith RN  Outcome: Adequate for Discharge  5/29/2024 1049 by Catrina Smith RN  Outcome: Progressing  5/29/2024 0520 by Roberto Messina RN  Outcome: Progressing     Problem: Discharge Planning  Goal: Discharge to home or other facility with appropriate resources  5/29/2024 1639 by Catrina Smith RN  Outcome: Adequate for Discharge  5/29/2024 1049 by Catrina Smith RN  Outcome: Progressing  5/29/2024 0520 by Roberto Messina RN  Outcome: Progressing     Problem: Skin/Tissue Integrity  Goal: Absence of new skin breakdown  Description: 1.  Monitor for areas of redness and/or skin breakdown  2.  Assess vascular access sites hourly  3.  Every 4-6 hours minimum:  Change oxygen saturation probe site  4.  Every 4-6 hours:  If on nasal continuous positive airway pressure, respiratory therapy assess nares and determine need for appliance change or resting period.  5/29/2024 1639 by Catrina Smith RN  Outcome: Adequate for Discharge  5/29/2024 1049 by Catrina Smith RN  Outcome: Progressing

## 2024-05-29 NOTE — DISCHARGE SUMMARY
DISCHARGE SUMMARY   [place] [address] Terri Sanchez  MRN: [id number]  YOB: 1945  78 y.o.female      Attending  Richard Morrow MD ?   Date of Admission  5/22/2024 Date of Discharge  05/29/24       ?  DIAGNOSES:  Principal Problem:    Closed displaced spiral fracture of shaft of left femur, initial encounter ([facility])  Active Problems:    Fall from standing    Acute pain due to trauma    Acute post-operative pain    Acute blood loss anemia    Closed 3-part fracture of surgical neck of left humerus with routine healing  Resolved Problems:    * No resolved hospital problems. *         PROCEDURES:  5/23/2024: #1.  Open reduction intra fixation periprosthetic left distal femur fracture  #2.  Prophylactic stabilization left femur due to severe osteoporosis by Dr. Sascha Coelho MD.   ?    DISCHARGE MEDICATIONS:   Current Discharge Medication List             Details   polyethylene glycol (GLYCOLAX) 17 g packet Take 1 packet by mouth 2 times daily for 10 days  Qty: 20 packet, Refills: 0      senna (SENOKOT) 8.6 MG tablet Take 1 tablet by mouth nightly for 10 days  Qty: 10 tablet, Refills: 0      lidocaine 4 % external patch Place 1 patch onto the skin daily for 10 days  Qty: 10 each, Refills: 0      enoxaparin Sodium (LOVENOX) 30 MG/0.3ML injection Inject 0.3 mLs into the skin 2 times daily  Qty: 25.2 mL, Refills: 0      acetaminophen (TYLENOL) 325 MG tablet Take 2 tablets by mouth every 6 hours for 15 days  Qty: 120 tablet, Refills: 0      oxyCODONE (ROXICODONE) 5 MG immediate release tablet Take 0.5 tablets by mouth every 8 hours as needed for Pain for up to 3 days. Max Daily Amount: 7.5 mg  Qty: 5 tablet, Refills: 0    Comments: Reduce doses taken as pain becomes manageable  Associated Diagnoses: Closed displaced spiral fracture of shaft of left femur, initial encounter ([facility]); Acute post-operative pain; Acute pain due to trauma                Details   methenamine

## 2024-05-29 NOTE — PROGRESS NOTES
Physical Therapy Med Surg Daily Treatment Note  Facility/Department: [facility] [place] [facility] 2W ORTHO TELE  Room: [place] [id number]       NAME: Terri Sanchez  : 1945 (78 y.o.)  MRN: [id number]  CODE STATUS: Full Code    Date of Service: 2024    Patient Diagnosis(es): Closed displaced spiral fracture of shaft of left femur, initial encounter ([facility]) [S72.342A]   Chief Complaint   Patient presents with    Fall    Leg Pain     Pt fell and hurt her left leg       Leg Injury     Patient Active Problem List    Diagnosis Date Noted    Closed displaced spiral fracture of shaft of left femur, initial encounter ([facility]) 2024    BV (bacterial vaginosis) 2018    [place]-Walker grade 3 cystocele     MATTY (stress urinary incontinence, female)     Uterine prolapse 10/04/2018    Complete uterine prolapse 2018    [place]-Walker grade 4 cystocele 2018    [place]-Walker grade 2 rectocele 2018    Recurrent UTI 2018    Rectal bleed 2018    Colitis 2018        Past Medical History:   Diagnosis Date    Hyperlipidemia     meds > 5 yrs    Hypertension     meds > 5 yrs    Osteoarthritis     Prolapsed uterus      Past Surgical History:   Procedure Laterality Date    BREAST SURGERY Right     fibrocystic breast /Bx / benign    COLONOSCOPY      ENDOSCOPY, COLON, DIAGNOSTIC      EYE SURGERY      phaco with IOL OU.    FIBULA FRACTURE SURGERY Left 2024    LEFT DISTAL FEMUR OPEN REDUCTION INTERNAL FIXATION performed by Sascha Coelho MD at [facility] OR    FRACTURE SURGERY Right     due to fracture / had skeletal hardware    CO COLONOSCOPY W/BIOPSY SINGLE/MULTIPLE N/A 4/10/2018    COLONOSCOPY WITH BIOPSYS performed by Conrad Ward MD at [facility] OR    CO DELIGATION URETER N/A 10/4/2018    USLS/ SSLS performed by Senia Antonio MD at [facility] OR    CO LAPS W/VAGINAL HYSTERECTOMY > 250 GRAMS N/A 10/4/2018    LAPARSCOPIC HYSTERECTOMY performed by Senia Antonio MD at [facility] OR    VAGINA SURGERY N/A 10/4/2018    A-P REPAIR WITH TOT

## 2024-05-29 NOTE — PROGRESS NOTES
Physical Therapy Med Surg Daily Treatment Note  Facility/Department: [facility] [place] [facility] 2W ORTHO TELE  Room: [place] [address]       NAME: Terri Sanchez  : 1945 (78 y.o.)  MRN: [id number]  CODE STATUS: Full Code    Date of Service: 2024    Patient Diagnosis(es): Closed displaced spiral fracture of shaft of left femur, initial encounter ([facility]) [S72.342A]   Chief Complaint   Patient presents with    Fall    Leg Pain     Pt fell and hurt her left leg       Leg Injury     Patient Active Problem List    Diagnosis Date Noted    Closed displaced spiral fracture of shaft of left femur, initial encounter ([facility]) 2024    BV (bacterial vaginosis) 2018    [place]-Walker grade 3 cystocele     MATTY (stress urinary incontinence, female)     Uterine prolapse 10/04/2018    Complete uterine prolapse 2018    [place] grade 4 cystocele 2018    [place] grade 2 rectocele 2018    Recurrent UTI 2018    Rectal bleed 2018    Colitis 2018        Past Medical History:   Diagnosis Date    Hyperlipidemia     meds > 5 yrs    Hypertension     meds > 5 yrs    Osteoarthritis     Prolapsed uterus      Past Surgical History:   Procedure Laterality Date    BREAST SURGERY Right     fibrocystic breast /Bx / benign    COLONOSCOPY      ENDOSCOPY, COLON, DIAGNOSTIC      EYE SURGERY      phaco with IOL OU.    FIBULA FRACTURE SURGERY Left 2024    LEFT DISTAL FEMUR OPEN REDUCTION INTERNAL FIXATION performed by Sascha Coelho MD at [facility] OR    FRACTURE SURGERY Right     due to fracture / had skeletal hardware    [place] COLONOSCOPY W/BIOPSY SINGLE/MULTIPLE N/A 4/10/2018    COLONOSCOPY WITH BIOPSYS performed by Conrad Ward MD at [facility] OR    [place] DELIGATION URETER N/A 10/4/2018    USLS/ SSLS performed by Senia Antonio MD at [facility] OR    [place] LAPS W/VAGINAL HYSTERECTOMY > 250 GRAMS N/A 10/4/2018    LAPARSCOPIC HYSTERECTOMY performed by Senia Antonio MD at [facility] OR    VAGINA SURGERY N/A 10/4/2018    A-P REPAIR WITH TOT

## 2024-05-30 ENCOUNTER — APPOINTMENT (OUTPATIENT)
Dept: PHYSICAL THERAPY | Age: 79
End: 2024-05-30
Payer: MEDICARE

## 2024-05-30 NOTE — PROGRESS NOTES
05/29/2024    2020: Patient transported to facility via ambulance service. All LDAs removed and patient has all belongings.    Electronically signed by Roberto Messina RN on 5/29/2024 at 8:39 PM

## 2024-05-30 NOTE — PROGRESS NOTES
Physical Therapy  Facility/Department: [facility] MED SURG W277/W277-01  Physical Therapy Discharge      NAME: Terri Sanchez    : 1945 (78 y.o.)  MRN: [id number]    Account: [id number]  Gender: female      Patient has been discharged from acute care hospital. DC patient from current PT program.      Electronically signed by Sheyla Louis PT on 24 at 3:57 PM EDT

## 2024-06-06 ENCOUNTER — TELEPHONE (OUTPATIENT)
Dept: ORTHOPEDIC SURGERY | Age: 79
End: 2024-06-06

## 2024-06-06 NOTE — TELEPHONE ENCOUNTER
Patient has staples in, she missed her appt on 6/4 she's at [facility] in [place] should they remove the staples or wait to her new ppt on Monday the 10th    [facility] [phone]

## 2024-06-06 NOTE — TELEPHONE ENCOUNTER
I called just at the nursing home.  I explained that [facility] did not do her surgery.  It was performed by [facility].  I gave her the number to call to make an appointment for follow-up.

## 2024-06-07 ENCOUNTER — OFFICE VISIT (OUTPATIENT)
Dept: ORTHOPEDIC SURGERY | Facility: CLINIC | Age: 79
End: 2024-06-07
Payer: MEDICARE

## 2024-06-07 ENCOUNTER — HOSPITAL ENCOUNTER (OUTPATIENT)
Dept: RADIOLOGY | Facility: CLINIC | Age: 79
Discharge: HOME | End: 2024-06-07
Payer: MEDICARE

## 2024-06-07 DIAGNOSIS — S72.302G CLOSED FRACTURE OF SHAFT OF LEFT FEMUR WITH DELAYED HEALING, UNSPECIFIED FRACTURE MORPHOLOGY, SUBSEQUENT ENCOUNTER: ICD-10-CM

## 2024-06-07 PROCEDURE — 1036F TOBACCO NON-USER: CPT | Performed by: STUDENT IN AN ORGANIZED HEALTH CARE EDUCATION/TRAINING PROGRAM

## 2024-06-07 PROCEDURE — 73552 X-RAY EXAM OF FEMUR 2/>: CPT | Mod: LEFT SIDE | Performed by: STUDENT IN AN ORGANIZED HEALTH CARE EDUCATION/TRAINING PROGRAM

## 2024-06-07 PROCEDURE — 73552 X-RAY EXAM OF FEMUR 2/>: CPT | Mod: LT

## 2024-06-07 PROCEDURE — 1159F MED LIST DOCD IN RCRD: CPT | Performed by: STUDENT IN AN ORGANIZED HEALTH CARE EDUCATION/TRAINING PROGRAM

## 2024-06-07 PROCEDURE — 99024 POSTOP FOLLOW-UP VISIT: CPT | Performed by: STUDENT IN AN ORGANIZED HEALTH CARE EDUCATION/TRAINING PROGRAM

## 2024-06-14 ENCOUNTER — APPOINTMENT (OUTPATIENT)
Dept: ORTHOPEDIC SURGERY | Facility: CLINIC | Age: 79
End: 2024-06-14
Payer: MEDICARE

## 2024-06-25 ENCOUNTER — TELEPHONE (OUTPATIENT)
Dept: PRIMARY CARE | Facility: CLINIC | Age: 79
End: 2024-06-25
Payer: MEDICARE

## 2024-06-27 NOTE — TELEPHONE ENCOUNTER
Patient aware & will try to get another doctor to do this so that it can be installed before she returns to her home.

## 2024-07-08 ENCOUNTER — PATIENT OUTREACH (OUTPATIENT)
Dept: PRIMARY CARE | Facility: CLINIC | Age: 79
End: 2024-07-08
Payer: MEDICARE

## 2024-07-08 ENCOUNTER — CARE COORDINATION (OUTPATIENT)
Dept: CASE MANAGEMENT | Age: 79
End: 2024-07-08

## 2024-07-08 ENCOUNTER — DOCUMENTATION (OUTPATIENT)
Dept: PRIMARY CARE | Facility: CLINIC | Age: 79
End: 2024-07-08
Payer: MEDICARE

## 2024-07-08 DIAGNOSIS — S72.342A CLOSED DISPLACED SPIRAL FRACTURE OF SHAFT OF LEFT FEMUR, INITIAL ENCOUNTER (HCC): Primary | ICD-10-CM

## 2024-07-08 PROCEDURE — 1111F DSCHRG MED/CURRENT MED MERGE: CPT | Performed by: FAMILY MEDICINE

## 2024-07-08 NOTE — CARE COORDINATION
Care Transitions Note    Initial Call - Call within 2 business days of discharge: Yes    Patient Current Location:  Home: [address] [place] [id number]    Care Transition Nurse contacted the  Nurse  by telephone to perform post hospital discharge assessment, verified name and  as identifiers. Provided introduction to self, and explanation of the Care Transition Nurse role.     Patient: Terri Sanchez    Patient : 1945   MRN: [id number]    Reason for Admission: Fall/Closed displaced spiral fx of shaft of L femur  Discharge Date: 24  RURS: Readmission Risk Score: 19.1      Last Discharge Facility       Date Complaint Diagnosis Description Type Department Provider    24 Fall; Leg Pain; Leg Injury Closed displaced spiral fracture of shaft of left femur, initial encounter (HCC) ... ED to Hosp-Admission (Discharged) (ADMITTED) Richard Chambers MD; Chung Garcia...            Was this an external facility discharge? Yes. Discharge Date: . Facility Name: [facility]    Additional needs identified to be addressed with provider   No needs identified             Method of communication with provider: none.    Patients top risk factors for readmission: medical condition-falls, fractures    Interventions to address risk factors:   Review of patient management of conditions/medications: with nurse    Care Summary Note: Nurse Noemí returned the call. States patient is doing really well. States no c/o pain. S/S of infection. No numbness or tingling.. Patient is non-weightbearing. Ortho f/u this 24. Patient transport by wheelchair. Patient requires assistance with ADL's. [facility] assisted living will provide assistance and PT/OT. Appetite okay. Fluid intake adequate. Elimination regular. Medications reviewed. Noemí states patient will be seen by NP who makes visits on  and . No further outreach.    Care Transition Nurse reviewed medical action plan with

## 2024-07-08 NOTE — CARE COORDINATION
Care Transitions Note    Initial Call - Call within 2 business days of discharge: Yes    Attempted to reach patient for transitions of care follow up. Unable to reach patient.    Outreach Attempts:   HIPAA compliant voicemail left for patient. Staff at [facility] confirm patient is in their assisted living.Caller left a HIPAA compliant message with contact information asking for a return call for nurse with [facility] and on patient's vm.    Patient: Terri Sanchez    Patient : 1945   MRN: <[id number]>    Reason for Admission: Fall Closed displaced spiral fx of shaft of L femur  Discharge Date: 24  RURS: Readmission Risk Score: 19.1    Last Discharge Facility       Date Complaint Diagnosis Description Type Department Provider    24 Fall; Leg Pain; Leg Injury Closed displaced spiral fracture of shaft of left femur, initial encounter ([facility]) ... ED to Hosp-Admission (Discharged) (ADMITTED) Richard Chambers MD; Chung Garcia...            Was this an external facility discharge? Yes. Discharge Date: . Facility Name: [facility]    Follow Up Appointment:   Patient does not have a follow up appointment scheduled at time of call.  Unable to reach patient      Plan for follow-up on next business day.      Jania Lemon RN

## 2024-07-08 NOTE — PROGRESS NOTES
Discharge Facility:  5/22/24-5/29/24 [facility]   5/29/24-7/5/24  [facility]     Discharge Diagnosis:  Fall  LEFT DISTAL FEMUR OPEN REDUCTION INTERNAL FIXATION 5/23/24    Admission Date:5/22/24  Discharge Date: 7/5/24      PCP Appointment Date:TBD-I am unable to make an appt due to no openings . Message sent to office staff requesting assistance. As well as encourged patient to call today to schedule.       Specialist Appointment Date:   7/12/24- [facility] Encounter and Summary Linked: Yes-limited at time of note    Two attempts were made to reach patient within two business days after discharge. I left voicemail to introduce myself and the TCM program to Shabana Adan. I encouraged patient to contact office or myself for follow up appt. I gave my contact information to return my call so we can go over discharge instructions and see if I can assist in anyway.

## 2024-07-12 ENCOUNTER — HOSPITAL ENCOUNTER (OUTPATIENT)
Dept: RADIOLOGY | Facility: CLINIC | Age: 79
Discharge: HOME | End: 2024-07-12
Payer: MEDICARE

## 2024-07-12 ENCOUNTER — OFFICE VISIT (OUTPATIENT)
Dept: ORTHOPEDIC SURGERY | Facility: CLINIC | Age: 79
End: 2024-07-12
Payer: MEDICARE

## 2024-07-12 DIAGNOSIS — S72.302G CLOSED FRACTURE OF SHAFT OF LEFT FEMUR WITH DELAYED HEALING, UNSPECIFIED FRACTURE MORPHOLOGY, SUBSEQUENT ENCOUNTER: Primary | ICD-10-CM

## 2024-07-12 DIAGNOSIS — S72.302G CLOSED FRACTURE OF SHAFT OF LEFT FEMUR WITH DELAYED HEALING, UNSPECIFIED FRACTURE MORPHOLOGY, SUBSEQUENT ENCOUNTER: ICD-10-CM

## 2024-07-12 PROCEDURE — 99211 OFF/OP EST MAY X REQ PHY/QHP: CPT | Performed by: STUDENT IN AN ORGANIZED HEALTH CARE EDUCATION/TRAINING PROGRAM

## 2024-07-12 PROCEDURE — 73552 X-RAY EXAM OF FEMUR 2/>: CPT | Mod: LT

## 2024-07-12 NOTE — PROGRESS NOTES
Chief Complaint   Patient presents with    Left Thigh - Follow-up     ORIF Lt femur fx  DOS: 5/23/24 (7 weeks out)  Xrays today          History of Present Illness   Patient presents today for follow up s/p ORIF left femur fracture.  Pain is improving s/p ORIF. No new numbness or tingling. No chest pain or shortness of breath. No calf pain.  Patient has been tolerating immobility and nonweightbearing status.         Review of Systems   GENERAL: Negative  GI: Negative  MUSCULOSKELETAL: See HPI  SKIN: Negative  NEURO:  Negative     Physical Exam:  side: left lower extremity:  Tenderness to palpation vivek-incisional  Incision healing well, No active drainage no discharge  Swelling / ecchymosis noted  DF/EHL/PF intact  SILT Foot  Good cap refill     Imaging    2 view left femur radiographs were ordered, performed, interpreted today displaying status post open reduction internal fixation periprosthetic distal femur fracture.  No signs of hardware failure or loosening.    XR femur left 2+ views  Narrative: Interpreted By:  Zion Miller III,   STUDY:  XR FEMUR LEFT 2+ VIEWS; ;  7/12/2024 1:16 pm      INDICATION:  Signs/Symptoms:fx.      COMPARISON:  None.      ACCESSION NUMBER(S):  [id number]      ORDERING CLINICIAN:  ZION MILLER      FINDINGS:  Multiple views left femur: Status post open reduction internal  fixation distal 3rd femoral shaft fracture periprosthetic. No signs  of hardware failure or loosening status post open reduction internal  fixation fracture line consolidation consistent with healing.      Impression: Status post open reduction internal fixation distal femur fracture          MACRO:  None      Signed by: Zion Miller III 7/12/2024 1:20 PM  Dictation workstation:   [id number]       Assessment   Patient is status post open reduction internal fixation femur fracture periprosthetic 7 weeks out      Plan:  We will proceed with gentle weightbearing as tolerated, using pain as a guide.  Will update  physical therapy prescription plan will be to be 50% weightbearing for the next week then progress to 100% after this.  Will gently wean out of the knee immobilizer over the course of this time.  Overall patient progressing appropriately  Continue with aggressive ice and elevation to minimize postoperative swelling  X-rays at follow-up 2 views left femur  Follow up in 8 weeks    Reviewed findings of severe osteoporosis and that patient would likely benefit from biphosphonate therapy.  She will discuss this with her primary care team.    We discussed the importance of vitamins particularly the use of vitamin D to maximize nutritional environment for healing of fracture. Patient agrees to optimize nutrition and diet to provide favorable environment for healing.

## 2024-07-17 ENCOUNTER — PATIENT OUTREACH (OUTPATIENT)
Dept: PRIMARY CARE | Facility: CLINIC | Age: 79
End: 2024-07-17
Payer: MEDICARE

## 2024-07-17 ENCOUNTER — TELEPHONE (OUTPATIENT)
Dept: CARE COORDINATION | Facility: CLINIC | Age: 79
End: 2024-07-17
Payer: MEDICARE

## 2024-07-17 NOTE — PROGRESS NOTES
Unable to reach patient for call back after patient's follow up appointment with PCP. -As of note- no appt completed or scheduled with office.   LVM with call back number for patient to call if needed  to assist with any questions or concerns patient may have.

## 2024-08-02 ENCOUNTER — TELEPHONE (OUTPATIENT)
Dept: PRIMARY CARE | Facility: CLINIC | Age: 79
End: 2024-08-02
Payer: MEDICARE

## 2024-08-02 NOTE — TELEPHONE ENCOUNTER
[facility] was calling for a verbal order to start nursing,  PT and OT. She is being discharged home from [facility] today. Please call Renee at [phone]

## 2024-08-22 ENCOUNTER — APPOINTMENT (OUTPATIENT)
Dept: PRIMARY CARE | Facility: CLINIC | Age: 79
End: 2024-08-22
Payer: MEDICARE

## 2024-08-22 VITALS
HEART RATE: 53 BPM | TEMPERATURE: 97.3 F | BODY MASS INDEX: 22.58 KG/M2 | WEIGHT: 115 LBS | DIASTOLIC BLOOD PRESSURE: 72 MMHG | HEIGHT: 60 IN | SYSTOLIC BLOOD PRESSURE: 130 MMHG

## 2024-08-22 DIAGNOSIS — Z98.890 STATUS POST OPEN REDUCTION AND INTERNAL FIXATION (ORIF) OF FRACTURE: ICD-10-CM

## 2024-08-22 DIAGNOSIS — S72.402D CLOSED FRACTURE OF DISTAL END OF LEFT FEMUR WITH ROUTINE HEALING, UNSPECIFIED FRACTURE MORPHOLOGY, SUBSEQUENT ENCOUNTER: ICD-10-CM

## 2024-08-22 DIAGNOSIS — Z78.9 NEVER SMOKED CIGARETTES: ICD-10-CM

## 2024-08-22 DIAGNOSIS — M80.00XD AGE-RELATED OSTEOPOROSIS WITH CURRENT PATHOLOGICAL FRACTURE WITH ROUTINE HEALING, SUBSEQUENT ENCOUNTER: ICD-10-CM

## 2024-08-22 DIAGNOSIS — M89.9 DISORDER OF BONE, UNSPECIFIED: ICD-10-CM

## 2024-08-22 DIAGNOSIS — Z87.81 STATUS POST OPEN REDUCTION AND INTERNAL FIXATION (ORIF) OF FRACTURE: ICD-10-CM

## 2024-08-22 PROCEDURE — 3078F DIAST BP <80 MM HG: CPT | Performed by: FAMILY MEDICINE

## 2024-08-22 PROCEDURE — 1160F RVW MEDS BY RX/DR IN RCRD: CPT | Performed by: FAMILY MEDICINE

## 2024-08-22 PROCEDURE — 99213 OFFICE O/P EST LOW 20 MIN: CPT | Performed by: FAMILY MEDICINE

## 2024-08-22 PROCEDURE — 1123F ACP DISCUSS/DSCN MKR DOCD: CPT | Performed by: FAMILY MEDICINE

## 2024-08-22 PROCEDURE — 1159F MED LIST DOCD IN RCRD: CPT | Performed by: FAMILY MEDICINE

## 2024-08-22 PROCEDURE — 1036F TOBACCO NON-USER: CPT | Performed by: FAMILY MEDICINE

## 2024-08-22 PROCEDURE — 3075F SYST BP GE 130 - 139MM HG: CPT | Performed by: FAMILY MEDICINE

## 2024-08-22 PROCEDURE — 1158F ADVNC CARE PLAN TLK DOCD: CPT | Performed by: FAMILY MEDICINE

## 2024-08-22 RX ORDER — NITROFURANTOIN 25; 75 MG/1; MG/1
100 CAPSULE ORAL DAILY
COMMUNITY
Start: 2024-08-02

## 2024-08-22 NOTE — PROGRESS NOTES
Subjective Shabana is here for a follow-up on left femur fracture.  She fell and twisted her leg about 3 months ago sustaining a distal femoral fracture.  This required surgical repair.  She then spent about 6 to 8 weeks in rehab with PT.  At this point her pain is minimal.  She is using a walker for ambulation and is following up with orthopedics.  She was told that she has osteoporosis just from the texture of her bones during surgery.  She is on calcium supplement.  She continues on her other meds.  She has no other complaints.    Patient ID: Shabana Adan is a 78 y.o. female who presents for Follow-up:    Problem List Items Addressed This Visit    None     Past Medical History:   Diagnosis Date    Body mass index (BMI) 24.0-24.9, adult 05/09/2022    Body mass index (BMI) of 24.0 to 24.9 in adult    Body mass index (BMI) 24.0-24.9, adult 11/08/2021    BMI 24.0-24.9, adult    Disorder of pigmentation, unspecified 11/05/2021    Discoloration of skin of face    Personal history of diseases of the blood and blood-forming organs and certain disorders involving the immune mechanism 11/05/2021    History of anemia    Personal history of other benign neoplasm 05/09/2022    History of other benign neoplasm      Past Surgical History:   Procedure Laterality Date    FEMUR FRACTURE SURGERY Left 05/22/2024    OTHER SURGICAL HISTORY  11/05/2021    Lumpectomy    OTHER SURGICAL HISTORY  11/05/2021    Hysterectomy    OTHER SURGICAL HISTORY  11/05/2021    Wrist surgery    OTHER SURGICAL HISTORY  11/05/2021    Cataract surgery    OTHER SURGICAL HISTORY  11/05/2021    Leg surgery    OTHER SURGICAL HISTORY  11/08/2021    Colonoscopy    XR CHEST PACEMAKER WITH FLUORO  06/24/2019    XR CHEST PACEMAKER WITH FLUORO 6/24/2019 [facility] CLINICAL LEGACY      Family History   Problem Relation Name Age of Onset    COPD Brother      Cancer Brother        Social History     Socioeconomic History    Marital status:      Spouse name: Not on file     Number of children: Not on file    Years of education: Not on file    Highest education level: Not on file   Occupational History    Not on file   Tobacco Use    Smoking status: Never    Smokeless tobacco: Never   Substance and Sexual Activity    Alcohol use: Never    Drug use: Never    Sexual activity: Not on file   Other Topics Concern    Not on file   Social History Narrative    Not on file     Social Determinants of Health     Financial Resource Strain: Low Risk  (7/25/2021)    Received from [facility]    Overall Financial Resource Strain (CARDIA)     Difficulty of Paying Living Expenses: Not very hard   Food Insecurity: No Food Insecurity (5/23/2024)    Received from [facility]    Hunger Vital Sign     Worried About Running Out of Food in the Last Year: Never true     Ran Out of Food in the Last Year: Never true   Transportation Needs: No Transportation Needs (5/23/2024)    Received from [facility]    PRAPARE - Transportation     Lack of Transportation (Medical): No     Lack of Transportation (Non-Medical): No   Physical Activity: Not on file   Stress: Not on file   Social Connections: Not on file   Intimate Partner Violence: Not on file   Housing Stability: Low Risk  (5/23/2024)    Received from [facility]    Housing Stability Vital Sign     Unable to Pay for Housing in the Last Year: No     Number of Places Lived in the Last Year: 1     Unstable Housing in the Last Year: No      Codeine, Hydrocodone-acetaminophen, Metronidazole, Sulfamethoxazole-trimethoprim, Ibuprofen, and Latex   Current Outpatient Medications   Medication Sig Dispense Refill    aspirin 81 mg EC tablet Take 1 tablet (81 mg) by mouth once daily.      calcium carbonate-vitamin D3 600 mg-20 mcg (800 unit) tablet  Take 1 tablet by mouth once daily.      cholecalciferol (Vitamin D-3) 50 mcg (2,000 unit) capsule Take 1 capsule (50 mcg) by mouth early in the morning..      cyanocobalamin (Vitamin B-12) 1,000 mcg tablet Take 1 tablet (1,000 mcg) by mouth once daily.      DILT- mg 24 hr capsule TAKE ONE CAPSULE BY MOUTH EVERY DAY 90 capsule 1    hydroCHLOROthiazide (Microzide) 12.5 mg capsule TAKE ONE CAPSULE BY MOUTH EVERY MORNING 90 capsule 1    methenamine hippurate (Hiprex) 1 gram tablet Take 0.5 tablets (0.5 g) by mouth once daily.      metoprolol succinate XL (Toprol-XL) 100 mg 24 hr tablet Take 1 tablet (100 mg) by mouth once daily. 90 tablet 1    nitrofurantoin, macrocrystal-monohydrate, (Macrobid) 100 mg capsule Take 1 capsule (100 mg) by mouth once daily.      potassium chloride CR 10 mEq ER tablet TAKE ONE TABLET BY MOUTH TWO TIMES A  tablet 1    pravastatin (Pravachol) 40 mg tablet TAKE ONE TABLET BY MOUTH AT BEDTIME 90 tablet 1     No current facility-administered medications for this visit.       Immunization History   Administered Date(s) Administered    Flu vaccine (IIV4), preservative free *Check age/dose* 09/26/2018    Flu vaccine, quadrivalent, high-dose, preservative free, age 65y+ (FLUZONE) 10/13/2020    Flu vaccine, trivalent, preservative free, HIGH-DOSE, age 65y+ (Fluzone) 10/29/2015, 11/17/2017, 09/24/2019, 10/13/2020    Influenza, Seasonal, Quadrivalent, Adjuvanted 10/13/2021, 10/17/2022, 10/23/2023    Influenza, Unspecified 10/01/2015, 09/27/2016, 10/01/2022    Moderna COVID-19 vaccine, Fall 2023, 12 yeasrs and older (50mcg/0.5mL) 11/21/2023    Moderna COVID-19 vaccine, bivalent, blue cap/gray label *Check age/dose* 10/29/2022    Moderna SARS-CoV-2 Vaccination 03/04/2021, 04/01/2021, 12/18/2021, 06/03/2022    Pneumococcal conjugate vaccine, 13-valent (PREVNAR 13) 07/14/2017    Pneumococcal polysaccharide vaccine, 23-valent, age 2 years and older (PNEUMOVAX 23) 07/01/2019        Review of  Systems   Musculoskeletal:  Positive for gait problem.   All other systems reviewed and are negative.       Vitals:    08/22/24 1257   BP: 130/72   Pulse: 53   Temp: 36.3 °C (97.3 °F)     Vitals:    08/22/24 1257   Weight: 52.2 kg (115 lb)      Physical Exam  Constitutional:       General: She is not in acute distress.     Appearance: Normal appearance.   Cardiovascular:      Rate and Rhythm: Normal rate and regular rhythm.      Pulses: Normal pulses.      Heart sounds: Normal heart sounds.   Pulmonary:      Effort: Pulmonary effort is normal.      Breath sounds: Normal breath sounds.   Neurological:      General: No focal deficit present.      Mental Status: She is alert and oriented to person, place, and time. Mental status is at baseline.          ASSESSMENT/PLAN: Left femoral fracture.  Follow-up with PT and orthopedics as scheduled.    Osteoporosis.  Patient to obtain bone density.  Calcium intake 12 to 1500 mg daily.  May need to consider biphosphonate therapy pending bone density results.    Hypertension stable.  Continue current meds noted    Obtain CBC CMP lipid profile and vitamin B12.  Follow-up in 3 months and call as needed     Scribe Attestation  By signing my name below, I, Sara Richter LPN, Scribe   attest that this documentation has been prepared under the direction and in the presence of Hernan Osborne MD.

## 2024-08-28 DIAGNOSIS — I10 PRIMARY HYPERTENSION: ICD-10-CM

## 2024-08-28 RX ORDER — METOPROLOL SUCCINATE 100 MG/1
100 TABLET, EXTENDED RELEASE ORAL DAILY
Qty: 90 TABLET | Refills: 1 | Status: SHIPPED | OUTPATIENT
Start: 2024-08-28

## 2024-08-29 DIAGNOSIS — I10 PRIMARY HYPERTENSION: ICD-10-CM

## 2024-08-29 RX ORDER — HYDROCHLOROTHIAZIDE 12.5 MG/1
12.5 CAPSULE ORAL
Qty: 90 CAPSULE | Refills: 1 | Status: SHIPPED | OUTPATIENT
Start: 2024-08-29

## 2024-09-03 ENCOUNTER — HOSPITAL ENCOUNTER (OUTPATIENT)
Dept: RADIOLOGY | Facility: CLINIC | Age: 79
Discharge: HOME | End: 2024-09-03
Payer: MEDICARE

## 2024-09-03 DIAGNOSIS — M85.80 OSTEOPENIA, UNSPECIFIED LOCATION: ICD-10-CM

## 2024-09-03 DIAGNOSIS — Z78.0 MENOPAUSE: ICD-10-CM

## 2024-09-03 PROCEDURE — 77080 DXA BONE DENSITY AXIAL: CPT

## 2024-09-03 ASSESSMENT — LIFESTYLE VARIABLES
3_OR_MORE_DRINKS_PER_DAY: N
CURRENT_SMOKER: N

## 2024-09-04 ENCOUNTER — TELEPHONE (OUTPATIENT)
Dept: PRIMARY CARE | Facility: CLINIC | Age: 79
End: 2024-09-04
Payer: MEDICARE

## 2024-09-04 ENCOUNTER — OFFICE VISIT (OUTPATIENT)
Dept: OBGYN CLINIC | Age: 79
End: 2024-09-04
Payer: MEDICARE

## 2024-09-04 VITALS
WEIGHT: 115 LBS | HEIGHT: 60 IN | SYSTOLIC BLOOD PRESSURE: 120 MMHG | HEART RATE: 68 BPM | BODY MASS INDEX: 22.58 KG/M2 | DIASTOLIC BLOOD PRESSURE: 66 MMHG

## 2024-09-04 DIAGNOSIS — N39.0 RECURRENT UTI: ICD-10-CM

## 2024-09-04 DIAGNOSIS — N39.0 RECURRENT UTI: Primary | ICD-10-CM

## 2024-09-04 DIAGNOSIS — N81.3 COMPLETE UTERINE PROLAPSE: ICD-10-CM

## 2024-09-04 LAB
BILIRUB UR QL STRIP: NEGATIVE
CLARITY UR: CLEAR
COLOR UR: ABNORMAL
GLUCOSE UR STRIP-MCNC: NEGATIVE MG/DL
HGB UR QL STRIP: NEGATIVE
KETONES UR STRIP-MCNC: ABNORMAL MG/DL
LEUKOCYTE ESTERASE UR QL STRIP: NEGATIVE
NITRITE UR QL STRIP: NEGATIVE
PH UR STRIP: 6 [PH] (ref 5–9)
PROT UR STRIP-MCNC: NEGATIVE MG/DL
SP GR UR STRIP: 1.02 (ref 1–1.03)
UROBILINOGEN UR STRIP-ACNC: 0.2 E.U./DL

## 2024-09-04 PROCEDURE — 99214 OFFICE O/P EST MOD 30 MIN: CPT | Performed by: OBSTETRICS & GYNECOLOGY

## 2024-09-04 PROCEDURE — 1123F ACP DISCUSS/DSCN MKR DOCD: CPT | Performed by: OBSTETRICS & GYNECOLOGY

## 2024-09-04 RX ORDER — DILTIAZEM HYDROCHLORIDE 240 MG/1
240 CAPSULE, EXTENDED RELEASE ORAL DAILY
COMMUNITY
Start: 2024-08-29

## 2024-09-04 RX ORDER — NITROFURANTOIN MACROCRYSTALS 50 MG/1
50 CAPSULE ORAL NIGHTLY
Qty: 30 CAPSULE | Refills: 3 | Status: SHIPPED | OUTPATIENT
Start: 2024-09-04 | End: 2025-01-02

## 2024-09-04 NOTE — PROGRESS NOTES
Specific Question:   Specify (ex-cath, midstream, cysto, etc)?     Answer:   midstream    Urinalysis     Standing Status:   Future     Standing Expiration Date:   9/4/2025     Orders Placed This Encounter   Medications    nitrofurantoin (MACRODANTIN) 50 MG capsule     Sig: Take 1 capsule by mouth nightly     Dispense:  30 capsule     Refill:  3       Follow Up:  Return in about 1 year (around 9/4/2025), or if symptoms worsen or fail to improve, for next annual exam visit.        Senia Antonio MD

## 2024-09-04 NOTE — TELEPHONE ENCOUNTER
----- Message from Hernan Osborne sent at 9/3/2024  1:05 PM EDT -----  Bone Density shows osteoporosis.  Should see rheumatologist to consider beginning injectable meds for this

## 2024-09-05 LAB — BACTERIA UR CULT: NORMAL

## 2024-09-16 ENCOUNTER — HOSPITAL ENCOUNTER (OUTPATIENT)
Dept: RADIOLOGY | Facility: CLINIC | Age: 79
Discharge: HOME | End: 2024-09-16
Payer: MEDICARE

## 2024-09-16 ENCOUNTER — OFFICE VISIT (OUTPATIENT)
Dept: ORTHOPEDIC SURGERY | Facility: CLINIC | Age: 79
End: 2024-09-16
Payer: MEDICARE

## 2024-09-16 DIAGNOSIS — S72.302G CLOSED FRACTURE OF SHAFT OF LEFT FEMUR WITH DELAYED HEALING, UNSPECIFIED FRACTURE MORPHOLOGY, SUBSEQUENT ENCOUNTER: ICD-10-CM

## 2024-09-16 PROCEDURE — 73552 X-RAY EXAM OF FEMUR 2/>: CPT | Mod: LEFT SIDE | Performed by: STUDENT IN AN ORGANIZED HEALTH CARE EDUCATION/TRAINING PROGRAM

## 2024-09-16 PROCEDURE — 99213 OFFICE O/P EST LOW 20 MIN: CPT | Performed by: STUDENT IN AN ORGANIZED HEALTH CARE EDUCATION/TRAINING PROGRAM

## 2024-09-16 PROCEDURE — 1123F ACP DISCUSS/DSCN MKR DOCD: CPT | Performed by: STUDENT IN AN ORGANIZED HEALTH CARE EDUCATION/TRAINING PROGRAM

## 2024-09-16 PROCEDURE — 73552 X-RAY EXAM OF FEMUR 2/>: CPT | Mod: LT

## 2024-09-16 NOTE — PROGRESS NOTES
Chief Complaint   Patient presents with    Left Thigh - Follow-up     ORIF Lt femur fx  DOS: 5/23/24   Xrays today          History of Present Illness   Patient presents today for follow up s/p ORIF left femur fracture.  . No new numbness or tingling. No chest pain or shortness of breath. No calf pain.  Ambulating independently with a cane.       Review of Systems   GENERAL: Negative  GI: Negative  MUSCULOSKELETAL: See HPI  SKIN: Negative  NEURO:  Negative     Physical Exam:  side: left lower extremity:  Tenderness to palpation vivek-incisional  Incision healing well, No active drainage no discharge  Swelling / ecchymosis noted  DF/EHL/PF intact  SILT Foot  Good cap refill     Imaging      XR femur left 2+ views  Narrative: Interpreted By:  Zion Miller III,   STUDY:  XR FEMUR LEFT 2+ VIEWS; ;  9/16/2024 9:38 am      INDICATION:  Signs/Symptoms:pain.      ,S72.302G Unspecified fracture of shaft of left femur, subsequent  encounter for closed fracture with delayed healing      COMPARISON:  None.      ACCESSION NUMBER(S):  [id number]      ORDERING CLINICIAN:  ZION MILLER      FINDINGS:  Multiple views left femur: Status post open reduction internal  fixation periprosthetic femoral shaft fracture no signs of hardware  failure or loosening continued demonstration of fracture lines  however there is abundant bridging bone/callus formation consistent  with healing.      Impression: Healing femoral shaft fracture          MACRO:  None      Signed by: Zion Miller III 9/16/2024 9:39 AM  Dictation workstation:   [id number]       Assessment   Patient is status post open reduction internal fixation femur fracture periprosthetic 3-1/2 months out     Plan:  Overall doing well  Recommend outpatient based physical therapy range of motion as tolerated as well as working on strength  Discussed activities to avoid as well as importance of using pain as a guide  Follow-up in 2 to 3 months time  She is currently getting  further workup for osteoporosis with rheumatology  X-rays at follow-up 2 views left femur

## 2024-09-20 ENCOUNTER — HOSPITAL ENCOUNTER (OUTPATIENT)
Dept: PHYSICAL THERAPY | Age: 79
Setting detail: THERAPIES SERIES
Discharge: HOME | End: 2024-09-20
Payer: MEDICARE

## 2024-09-20 PROCEDURE — 97162 PT EVAL MOD COMPLEX 30 MIN: CPT

## 2024-09-24 ENCOUNTER — HOSPITAL ENCOUNTER (OUTPATIENT)
Dept: PHYSICAL THERAPY | Age: 79
Setting detail: THERAPIES SERIES
Discharge: HOME | End: 2024-09-24
Payer: MEDICARE

## 2024-09-24 PROCEDURE — 97110 THERAPEUTIC EXERCISES: CPT

## 2024-09-24 PROCEDURE — 97112 NEUROMUSCULAR REEDUCATION: CPT

## 2024-09-24 ASSESSMENT — PAIN DESCRIPTION - DESCRIPTORS: DESCRIPTORS: ACHING

## 2024-09-24 ASSESSMENT — PAIN DESCRIPTION - ORIENTATION: ORIENTATION: LOWER;LEFT

## 2024-09-24 ASSESSMENT — PAIN SCALES - GENERAL: PAINLEVEL_OUTOF10: 4

## 2024-09-24 ASSESSMENT — PAIN DESCRIPTION - PAIN TYPE: TYPE: CHRONIC PAIN

## 2024-09-24 ASSESSMENT — PAIN DESCRIPTION - LOCATION: LOCATION: BACK;HIP

## 2024-09-27 ENCOUNTER — HOSPITAL ENCOUNTER (OUTPATIENT)
Dept: PHYSICAL THERAPY | Age: 79
Setting detail: THERAPIES SERIES
Discharge: HOME | End: 2024-09-27
Payer: MEDICARE

## 2024-09-27 DIAGNOSIS — E78.5 DYSLIPIDEMIA: ICD-10-CM

## 2024-09-27 DIAGNOSIS — I10 PRIMARY HYPERTENSION: ICD-10-CM

## 2024-09-27 PROCEDURE — 97116 GAIT TRAINING THERAPY: CPT

## 2024-09-27 PROCEDURE — 97110 THERAPEUTIC EXERCISES: CPT

## 2024-09-30 RX ORDER — PRAVASTATIN SODIUM 40 MG/1
40 TABLET ORAL NIGHTLY
Qty: 90 TABLET | Refills: 1 | Status: SHIPPED | OUTPATIENT
Start: 2024-09-30

## 2024-09-30 RX ORDER — DILTIAZEM HYDROCHLORIDE 240 MG/1
240 CAPSULE, EXTENDED RELEASE ORAL DAILY
Qty: 90 CAPSULE | Refills: 1 | Status: SHIPPED | OUTPATIENT
Start: 2024-09-30

## 2024-10-01 ENCOUNTER — HOSPITAL ENCOUNTER (OUTPATIENT)
Dept: PHYSICAL THERAPY | Age: 79
Setting detail: THERAPIES SERIES
Discharge: HOME | End: 2024-10-01
Payer: MEDICARE

## 2024-10-01 PROCEDURE — 97112 NEUROMUSCULAR REEDUCATION: CPT

## 2024-10-01 PROCEDURE — 97110 THERAPEUTIC EXERCISES: CPT

## 2024-10-01 ASSESSMENT — PAIN DESCRIPTION - ORIENTATION: ORIENTATION: LEFT

## 2024-10-01 ASSESSMENT — PAIN DESCRIPTION - PAIN TYPE: TYPE: CHRONIC PAIN

## 2024-10-01 ASSESSMENT — PAIN SCALES - GENERAL: PAINLEVEL_OUTOF10: 3

## 2024-10-01 ASSESSMENT — PAIN DESCRIPTION - DESCRIPTORS: DESCRIPTORS: ACHING

## 2024-10-01 ASSESSMENT — PAIN DESCRIPTION - LOCATION: LOCATION: BACK;HIP

## 2024-10-01 NOTE — PROGRESS NOTES
// bars  Exercise 11: step ups on 4\" box x10 1 UE support F/L x15 ea  Exercise 20: HEP: cont current    Assessment:   Body Structures, Functions, Activity Limitations Requiring Skilled Therapeutic Intervention: Decreased functional mobility , Decreased ROM, Decreased strength, Decreased endurance, Decreased balance, Increased pain  Assessment: Continued exercise progressions for B LE strength to improve gait quaility and allow patient to ambulate with LRD. Patient completes demonstrates decreased Quad lag bilaterally during SLRs this date. She continues to demonstrate decreased left LE stance time during gait drills and requires contralateral support to complete safely.  Treatment Diagnosis: impaired gait, impaired LE strength    Post-Pain Assessment:       Pain Rating (0-10 pain scale):   0/10   Location and pain description same as pre-treatment unless indicated.   Action: [x] NA   [] Perform HEP  [] Meds as prescribed  [] Modalities as prescribed   [] Call Physician     GOALS   Patient Goal(s): Patient Goals : \"stronger legs\"    Short Term Goals Completed by 3 weeks Goal Status   STG 1 Patient will be independent with HEP. In progress     Long Term Goals Completed by 4 weeks Goal Status   LTG 1 Patient will increase left knee flexion >/= 110 degrees and bilateral SLR >/= 70 degrees for improved functional tolerance. In progress   LTG 2 Patient will increase strength in bilateral LEs >/= 4+/5 for improved standing tolerance. In progress   LTG 3 Patient will ambulate with LRD independently 150ft with improved bilateral step length and symmetry. In progress   LTG 4 Ruano balance >/= 45/56 to decrease fall risk. In progress     Plan:  Frequency/Duration:  Plan  Plan Frequency: 2  Plan weeks: 4  Current Treatment Recommendations: Strengthening, ROM, Balance training, Functional mobility training, Gait training, Stair training, Neuromuscular re-education, Home exercise program, Safety education & training,

## 2024-10-04 ENCOUNTER — HOSPITAL ENCOUNTER (OUTPATIENT)
Dept: PHYSICAL THERAPY | Age: 79
Setting detail: THERAPIES SERIES
Discharge: HOME | End: 2024-10-04
Payer: MEDICARE

## 2024-10-04 PROCEDURE — 97110 THERAPEUTIC EXERCISES: CPT

## 2024-10-04 PROCEDURE — 97112 NEUROMUSCULAR REEDUCATION: CPT

## 2024-10-04 NOTE — PROGRESS NOTES
[facility]  Outpatient Physical Therapy    Treatment Note        Date: 10/4/2024  Patient: Terri Sanchez  : 1945   Confirmed: Yes  MRN: [id number]  Referring Provider: Sascha Coelho DO    Medical Diagnosis: Unspecified fracture of shaft of left femur, subsequent encounter for closed fracture with delayed healing [S75.989U]       Treatment Diagnosis: impaired gait, impaired LE strength    Visit Information:  Insurance: Payor: [employer] ADVANTAGE / Plan: [employer] ADVANT[employer] PREFERRED PPO / Product Type: *No Product type* /   PT Visit Information  PT Insurance Information: Medical [place] Medicare  Total # of Visits Approved:  (BMN)  Total # of Visits to Date: 4  Plan of Care/Certification Expiration Date: 24  No Show: 0  Progress Note Due Date: 10/18/24  Canceled Appointment: 0  Progress Note Counter:     Subjective Information:  Subjective: \"I am doing great! My sciatic pain is almost gone.  HEP Compliance:  [] Good [] Fair [] Poor [] Reports not doing due to:    Pain Screening  Patient Currently in Pain: Denies    Treatment:  Exercises:  Exercises  Exercise 1: SciFit level 3 x5 minutes  Exercise 2: seated lateral step overs small ailyn 2# ankle weight x10 B  Exercise 3: SLR with TA x15 Hasmukh  Exercise 4: clams x10; s/L abd x10  Exercise 5: hamstring curls with [place] 5\"x10  Exercise 6: Rockerboard 3 jonny l x20  Exercise 7: sit to stands from chair without UE support 2x5  Exercise 8: single steps forward/lateral over small ailyn x10 B- 1 UE  Exercise 9: step downs 4\" x10  Exercise 10: gait drills F/L/R/march x2 laps ea at // bars  Exercise 11: step ups on 4\" box x10 1 UE support F/L x15 ea    *Indicates exercise, modality, or manual techniques to be initiated when appropriate    Objective Measures:     Ambulation  Surface: Carpet  Device: Single point cane  Assistance: Supervision  Quality of Gait: improving LLE stance, NBOS, decreased gait speed, reciprocal  Gait

## 2024-10-08 ENCOUNTER — HOSPITAL ENCOUNTER (OUTPATIENT)
Dept: PHYSICAL THERAPY | Age: 79
Setting detail: THERAPIES SERIES
Discharge: HOME | End: 2024-10-08
Payer: MEDICARE

## 2024-10-08 PROCEDURE — 97112 NEUROMUSCULAR REEDUCATION: CPT

## 2024-10-08 PROCEDURE — 97110 THERAPEUTIC EXERCISES: CPT

## 2024-10-08 ASSESSMENT — PAIN SCALES - GENERAL: PAINLEVEL_OUTOF10: 0

## 2024-10-08 NOTE — PROGRESS NOTES
[facility]  Outpatient Physical Therapy   Treatment Note        Date: 10/8/2024  Patient: Terri Sanchez  : 1945   Confirmed: Yes  MRN: [id number]  Referring Provider: Sascha Coelho DO      Medical Diagnosis: Unspecified fracture of shaft of left femur, subsequent encounter for closed fracture with delayed healing [S70.569M]      Treatment Diagnosis: impaired gait, impaired LE strength    Visit Information:  Insurance: Payor: [employer] ADVANTAGE / Plan: [employer] ADVANTAGE PREFERRED PPO / Product Type: *No Product type* /   PT Visit Information  PT Insurance Information: Medical [place] Medicare  Total # of Visits Approved:  (BMN)  Total # of Visits to Date: 6  Plan of Care/Certification Expiration Date: 24  No Show: 0  Progress Note Due Date: 10/18/24  Canceled Appointment: 0  Progress Note Counter:     Subjective Information:  Subjective: Patient reports she like using the quad cane in the community.  HEP Compliance:  [x] Good [] Fair [] Poor [] Reports not doing due to:             Pain Screening  Patient Currently in Pain: Denies  Pain Assessment: 0-10  Pain Level: 0    Treatment:  Exercises:  Exercises  Exercise 1: SciFit level 3 x 6 minutes  Exercise 2: seated lateral step overs small ailyn 2# ankle weight x10 B  Exercise 5: hamstring curls with YTB x 10 (cues for eccentric control)  Exercise 7: sit to stands from chair x 10  Exercise 8: single steps forward/lateral over small ailyn x10 B- 1 UE  Exercise 10: gait drills /L//march x2 laps ea at // bars  Exercise 11: step ups on 6'' box x 10, with UE support forward, bilateral  Exercise 12: ambulation over 6'' hurdles x 2 laps  Exercise 13: foam: FA, FT, EC, head turns, shoulder flexion, toe taps  Exercise 14: knee flexion stretch at 6'' step 20 sec hold x 3, left  Exercise 20: HEP: cont current       *Indicates exercise, modality, or manual techniques to be initiated when appropriate      Assessment:

## 2024-10-09 ENCOUNTER — TELEPHONE (OUTPATIENT)
Dept: PRIMARY CARE | Facility: CLINIC | Age: 79
End: 2024-10-09
Payer: MEDICARE

## 2024-10-09 NOTE — TELEPHONE ENCOUNTER
Patient is concerned waiting for November appointment to discuss options regarding her Bone Density Result options.    The soonest Rheumatology appointment thru [facility] is 4/2/2025.      Shabana takes 2 calcium pills, but 2 different therapists said she should ask you if there is something more she can take/do while waiting until April.    Please advise

## 2024-10-11 ENCOUNTER — HOSPITAL ENCOUNTER (OUTPATIENT)
Dept: PHYSICAL THERAPY | Age: 79
Setting detail: THERAPIES SERIES
Discharge: HOME | End: 2024-10-11
Payer: MEDICARE

## 2024-10-11 PROCEDURE — 97112 NEUROMUSCULAR REEDUCATION: CPT

## 2024-10-11 PROCEDURE — 97116 GAIT TRAINING THERAPY: CPT

## 2024-10-11 PROCEDURE — 97110 THERAPEUTIC EXERCISES: CPT

## 2024-10-11 ASSESSMENT — PAIN SCALES - GENERAL: PAINLEVEL_OUTOF10: 0

## 2024-10-11 NOTE — PROGRESS NOTES
[facility]  Outpatient Physical Therapy   Treatment Note        Date: 10/11/2024  Patient: Terri Sanchez  : 1945   Confirmed: Yes  MRN: [id number]  Referring Provider: Sascha Coelho DO      Medical Diagnosis: Unspecified fracture of shaft of left femur, subsequent encounter for closed fracture with delayed healing [S76.324B]      Treatment Diagnosis: impaired gait, impaired LE strength    Visit Information:  Insurance: Payor: [employer] ADVANTAGE / Plan: [employer] ADVANTAGE PREFERRED PPO / Product Type: *No Product type* /   PT Visit Information  PT Insurance Information: Medical [place] Medicare  Total # of Visits Approved:  (BMN)  Total # of Visits to Date: 7  Plan of Care/Certification Expiration Date: 24  No Show: 0  Progress Note Due Date: 10/18/24  Canceled Appointment: 0  Progress Note Counter:     Subjective Information:  Subjective: Patient reports some discomfort on left side that might be sciatic pain.  HEP Compliance:  [x] Good [] Fair [] Poor [] Reports not doing due to:             Pain Screening  Patient Currently in Pain: No  Pain Assessment: 0-10  Pain Level: 0    Treatment:  Exercises:  Exercises  Exercise 1: SciFit level 3.2 x 6 minutes  Exercise 7: mini squats x 10 with UE support  Exercise 10: gait drills /L//march x2 laps ea at // bars with 1# ankle weight  Exercise 12: ambulation over 6'' hurdles x 2 laps with 1# ankle weight forward, lateral  Exercise 13: foam: FA, FT, EC, head turns, semi tandem  Exercise 14: knee flexion stretch at 8'' step 20 sec hold x 3, left  Exercise 15: toe taps 8'' x 2 minutes with one UE support  Exercise 16: tandem static standing; tandem walking in // bars x 3 laps with UE support  Exercise 20: HEP: cont current       *Indicates exercise, modality, or manual techniques to be initiated when appropriate    Objective Measures:     Ambulation  Surface: Carpet  Device: Small Base Quad Cane  Assistance: Modified

## 2024-10-15 ENCOUNTER — HOSPITAL ENCOUNTER (OUTPATIENT)
Dept: PHYSICAL THERAPY | Age: 79
Setting detail: THERAPIES SERIES
Discharge: HOME | End: 2024-10-15
Payer: MEDICARE

## 2024-10-15 PROCEDURE — 97112 NEUROMUSCULAR REEDUCATION: CPT

## 2024-10-15 PROCEDURE — 97110 THERAPEUTIC EXERCISES: CPT

## 2024-10-15 ASSESSMENT — PAIN SCALES - GENERAL: PAINLEVEL_OUTOF10: 0

## 2024-10-15 NOTE — PLAN OF CARE
[facility]  PHYSICAL THERAPY PLAN OF CARE                                    [facility] [place] [address]     Ph: [phone]  Fax: [phone]    [] Certification  [x] Recertification [x]  Plan of Care  [] Progress Note [] Discharge      Referring Provider: Sascha Coelho DO    From:  Catrina Bergeron, PT     Patient: Terri Sanchez (78 y.o. female) : 1945 Date: 10/15/2024   Medical Diagnosis: Unspecified fracture of shaft of left femur, subsequent encounter for closed fracture with delayed healing [S72.302G]    Treatment Diagnosis: impaired gait, impaired LE strength    Plan of Care/Certification Expiration Date: 24   Progress Report Period from: 2024  to 10/15/2024    Visits to Date: 8 No Show: 0 Cancelled Appts: 0    OBJECTIVE:   Short Term Goals - Time Frame for Short Term Goals: 3 weeks    Goals Current/Discharge status  Status   Short Term Goal 1: Patient will be independent with HEP.  STG 1 Current Status:: 10/15 pt independent with current program   In progress     Long Term Goals - Time Frame for Long Term Goals : 4 weeks  Goals Current/ Discharge status Status   Long Term Goal 1: Patient will increase left knee flexion >/= 110 degrees and bilateral SLR >/= 70 degrees for improved functional tolerance. LTG 1 Current Status:: 10/15 see ROM     AROM LLE (degrees)  L Knee Flexion (0-145): 105 deg          PROM LLE (degrees)  L SLR: 65 deg   PROM RLE (degrees)  R SLR: 50 deg                    In progress   Long Term Goal 2: Patient will increase strength in bilateral LEs >/= 4+/5 for improved standing tolerance. LTG 2 Current Status:: 10/15 see strength  Strength RLE  R Hip Flexion: 4-/5  R Hip ABduction: 3+/5  R Knee Flexion: 4+/5  R Knee Extension: 4+/5  R Ankle Dorsiflexion: 4+/5  Strength LLE  L Hip Flexion: 3+/5  L Hip ABduction: 3-/5  L Knee Flexion: 4-/5  L Knee Extension: 4/5  L Ankle Dorsiflexion: 4/5            In progress   Long Term Goal 3: Patient will

## 2024-10-15 NOTE — PROGRESS NOTES
LTG 3 Patient will ambulate with LRD independently 150ft with improved bilateral step length and symmetry. Partially met   LTG 4 Ruano balance >/= 45/56 to decrease fall risk. In progress       Plan:  Frequency/Duration:  Plan  Plan Frequency: 2  Plan weeks: 4  Current Treatment Recommendations: Strengthening, ROM, Balance training, Functional mobility training, Gait training, Stair training, Neuromuscular re-education, Home exercise program, Safety education & training, Patient/Caregiver education & training, Equipment evaluation, education, & procurement, Modalities  Modalities: Heat/Cold  Additional Comments: additional visits  Pt to continue current HEP.  See objective section for any therapeutic exercise changes, additions or modifications this date.    Therapy Time:      PT Individual Minutes  Time In: 1000  Time Out: 1058  Minutes: 58  Timed Code Treatment Minutes: 58 Minutes  Procedure Minutes:0 minutes  Timed Activity Minutes Units   Ther Ex 23 2   Gait 5    Neuro Jonh 30 2     Electronically signed by Catrina Bergeron, PT on 10/15/24 at 1:51 PM EDT

## 2024-10-17 ENCOUNTER — TELEPHONE (OUTPATIENT)
Dept: PRIMARY CARE | Facility: CLINIC | Age: 79
End: 2024-10-17
Payer: MEDICARE

## 2024-10-17 ENCOUNTER — HOSPITAL ENCOUNTER (OUTPATIENT)
Dept: PHYSICAL THERAPY | Age: 79
Setting detail: THERAPIES SERIES
Discharge: HOME | End: 2024-10-17
Payer: MEDICARE

## 2024-10-17 PROCEDURE — 97110 THERAPEUTIC EXERCISES: CPT

## 2024-10-17 PROCEDURE — 97112 NEUROMUSCULAR REEDUCATION: CPT

## 2024-10-17 NOTE — PROGRESS NOTES
Equipment evaluation, education, & procurement, Modalities  Modalities: Heat/Cold  Additional Comments: additional visits  Pt to continue current HEP.  See objective section for any therapeutic exercise changes, additions or modifications this date.    Therapy Time:      PT Individual Minutes  Time In: 1001  Time Out: 1058  Minutes: 57  Timed Code Treatment Minutes: 57 Minutes  Procedure Minutes:  Timed Activity Minutes Units   Ther Ex 42 3   Neuro Jonh 15 1     Electronically signed by Jennifer Santana PTA on 10/17/24 at 10:51 AM EDT

## 2024-10-17 NOTE — TELEPHONE ENCOUNTER
Patient left voicemail stating has appt with PCP on 10/21 at 9:15 am and had received voicemail from

## 2024-10-21 ENCOUNTER — APPOINTMENT (OUTPATIENT)
Dept: PRIMARY CARE | Facility: CLINIC | Age: 79
End: 2024-10-21
Payer: MEDICARE

## 2024-10-21 ENCOUNTER — OFFICE VISIT (OUTPATIENT)
Dept: PRIMARY CARE | Facility: CLINIC | Age: 79
End: 2024-10-21
Payer: MEDICARE

## 2024-10-21 VITALS
BODY MASS INDEX: 21.48 KG/M2 | SYSTOLIC BLOOD PRESSURE: 146 MMHG | HEART RATE: 53 BPM | DIASTOLIC BLOOD PRESSURE: 63 MMHG | HEIGHT: 60 IN | TEMPERATURE: 97.6 F | WEIGHT: 109.4 LBS

## 2024-10-21 DIAGNOSIS — M81.0 OSTEOPOROSIS, UNSPECIFIED OSTEOPOROSIS TYPE, UNSPECIFIED PATHOLOGICAL FRACTURE PRESENCE: Primary | ICD-10-CM

## 2024-10-21 DIAGNOSIS — Z00.00 ENCOUNTER FOR MEDICARE ANNUAL WELLNESS EXAM: ICD-10-CM

## 2024-10-21 PROCEDURE — G0439 PPPS, SUBSEQ VISIT: HCPCS | Performed by: FAMILY MEDICINE

## 2024-10-21 PROCEDURE — 1158F ADVNC CARE PLAN TLK DOCD: CPT | Performed by: FAMILY MEDICINE

## 2024-10-21 PROCEDURE — 1159F MED LIST DOCD IN RCRD: CPT | Performed by: FAMILY MEDICINE

## 2024-10-21 PROCEDURE — 1123F ACP DISCUSS/DSCN MKR DOCD: CPT | Performed by: FAMILY MEDICINE

## 2024-10-21 PROCEDURE — 3078F DIAST BP <80 MM HG: CPT | Performed by: FAMILY MEDICINE

## 2024-10-21 PROCEDURE — 1170F FXNL STATUS ASSESSED: CPT | Performed by: FAMILY MEDICINE

## 2024-10-21 PROCEDURE — 1160F RVW MEDS BY RX/DR IN RCRD: CPT | Performed by: FAMILY MEDICINE

## 2024-10-21 PROCEDURE — 3077F SYST BP >= 140 MM HG: CPT | Performed by: FAMILY MEDICINE

## 2024-10-21 PROCEDURE — 1036F TOBACCO NON-USER: CPT | Performed by: FAMILY MEDICINE

## 2024-10-21 ASSESSMENT — PATIENT HEALTH QUESTIONNAIRE - PHQ9
1. LITTLE INTEREST OR PLEASURE IN DOING THINGS: SEVERAL DAYS
10. IF YOU CHECKED OFF ANY PROBLEMS, HOW DIFFICULT HAVE THESE PROBLEMS MADE IT FOR YOU TO DO YOUR WORK, TAKE CARE OF THINGS AT HOME, OR GET ALONG WITH OTHER PEOPLE: NOT DIFFICULT AT ALL
SUM OF ALL RESPONSES TO PHQ9 QUESTIONS 1 AND 2: 1
2. FEELING DOWN, DEPRESSED OR HOPELESS: NOT AT ALL

## 2024-10-21 ASSESSMENT — ACTIVITIES OF DAILY LIVING (ADL)
DRESSING: INDEPENDENT
MANAGING_FINANCES: INDEPENDENT
BATHING: INDEPENDENT
DOING_HOUSEWORK: NEEDS ASSISTANCE
TAKING_MEDICATION: INDEPENDENT
GROCERY_SHOPPING: INDEPENDENT

## 2024-10-23 ENCOUNTER — HOSPITAL ENCOUNTER (OUTPATIENT)
Dept: PHYSICAL THERAPY | Age: 79
Setting detail: THERAPIES SERIES
Discharge: HOME | End: 2024-10-23
Payer: MEDICARE

## 2024-10-23 PROCEDURE — 97110 THERAPEUTIC EXERCISES: CPT

## 2024-10-23 PROCEDURE — 97112 NEUROMUSCULAR REEDUCATION: CPT

## 2024-10-23 ASSESSMENT — ENCOUNTER SYMPTOMS
RESPIRATORY NEGATIVE: 1
MUSCULOSKELETAL NEGATIVE: 1
PSYCHIATRIC NEGATIVE: 1
CONSTITUTIONAL NEGATIVE: 1
ALLERGIC/IMMUNOLOGIC NEGATIVE: 1
EYES NEGATIVE: 1
HEMATOLOGIC/LYMPHATIC NEGATIVE: 1
NEUROLOGICAL NEGATIVE: 1
CARDIOVASCULAR NEGATIVE: 1
ENDOCRINE NEGATIVE: 1
GASTROINTESTINAL NEGATIVE: 1

## 2024-10-23 NOTE — PROGRESS NOTES
[facility]  Outpatient Physical Therapy   Treatment Note        Date: 10/23/2024  Patient: Terri Sanchez  : 1945   Confirmed: Yes  MRN: [id number]  Referring Provider: Sascha Coelho DO      Medical Diagnosis: Unspecified fracture of shaft of left femur, subsequent encounter for closed fracture with delayed healing [S76.087J]      Treatment Diagnosis: impaired gait, impaired LE strength    Visit Information:  Insurance: Payor: [employer] ADVANTAGE / Plan: [employer] ADVANTAGE PREFERRED PPO / Product Type: *No Product type* /   PT Visit Information  PT Insurance Information: Medical [place] Medicare  Total # of Visits Approved:  (BMN)  Total # of Visits to Date: 10  Plan of Care/Certification Expiration Date: 24  No Show: 0  Progress Note Due Date: 11/15/24  Canceled Appointment: 0  Progress Note Counter:     Subjective Information:  Subjective: Pt states \"I feel good every time I leave here, I know I'm getting stronger.\"  HEP Compliance:  [x] Good [] Fair [] Poor [] Reports not doing due to:               Pain Screening  Patient Currently in Pain: No    Treatment:  Exercises:  Exercises  Exercise 1: SciFit level 3.5 x 6 minutes  Exercise 2: standing 4-way hip YTB x10 ea B  Exercise 4: TKE with YTB x 15  Exercise 10: gait drills: with 2# ankle weight forward, lateral, retro, March( F/R) x 3 laps  Exercise 11: step ups 4'' F/L focus on TKE and decreasing UE pull x10 ea  Exercise 12: ambulation over 6'' hurdles F/L x3 laps ea  Exercise 15: toe taps 6'' x 2 minutes with one UE support       *Indicates exercise, modality, or manual techniques to be initiated when appropriate    Objective Measures:     Assessment:   Body Structures, Functions, Activity Limitations Requiring Skilled Therapeutic Intervention: Decreased functional mobility , Decreased ROM, Decreased strength, Decreased endurance, Decreased balance, Increased pain  Assessment: Cont to progress therex w/

## 2024-10-23 NOTE — PROGRESS NOTES
Cristy Donald is here accompanied by her son for her and her annual wellness visit.  She is overall feeling well.  However her  passed away after he protracted illness several weeks ago.  Overall she has been dealing with this fairly well with the help and support of her family.  She continues on her medications as noted.  She has no other complaints at the present time.  She did have COVID in September 2024.  She has recovered well from this.    Patient ID: Shabana Adan is a 78 y.o. female who presents for Medicare Annual Wellness Visit Subsequent (Questioning flu and covid vaccines.  Had covid in Sept./Needs blood work orders. /Can't get into see rheumo until April 2025):    Problem List Items Addressed This Visit    None  Visit Diagnoses       Osteoporosis, unspecified osteoporosis type, unspecified pathological fracture presence    -  Primary    Relevant Orders    Vitamin D 25-Hydroxy,Total (for eval of Vitamin D levels)           Past Medical History:   Diagnosis Date    Body mass index (BMI) 24.0-24.9, adult 05/09/2022    Body mass index (BMI) of 24.0 to 24.9 in adult    Body mass index (BMI) 24.0-24.9, adult 11/08/2021    BMI 24.0-24.9, adult    Disorder of pigmentation, unspecified 11/05/2021    Discoloration of skin of face    Personal history of diseases of the blood and blood-forming organs and certain disorders involving the immune mechanism 11/05/2021    History of anemia    Personal history of other benign neoplasm 05/09/2022    History of other benign neoplasm      Past Surgical History:   Procedure Laterality Date    FEMUR FRACTURE SURGERY Left 05/22/2024    OTHER SURGICAL HISTORY  11/05/2021    Lumpectomy    OTHER SURGICAL HISTORY  11/05/2021    Hysterectomy    OTHER SURGICAL HISTORY  11/05/2021    Wrist surgery    OTHER SURGICAL HISTORY  11/05/2021    Cataract surgery    OTHER SURGICAL HISTORY  11/05/2021    Leg surgery    OTHER SURGICAL HISTORY  11/08/2021    Colonoscopy    XR CHEST  PACEMAKER WITH FLUORO  06/24/2019    XR CHEST PACEMAKER WITH FLUORO 6/24/2019 [facility] CLINICAL LEGACY      Family History   Problem Relation Name Age of Onset    COPD Brother      Cancer Brother        Social History     Socioeconomic History    Marital status:      Spouse name: Not on file    Number of children: Not on file    Years of education: Not on file    Highest education level: Not on file   Occupational History    Not on file   Tobacco Use    Smoking status: Never    Smokeless tobacco: Never   Substance and Sexual Activity    Alcohol use: Never    Drug use: Never    Sexual activity: Not on file   Other Topics Concern    Not on file   Social History Narrative    Not on file     Social Drivers of Health     Financial Resource Strain: Low Risk  (7/25/2021)    Received from [facility]    Overall Financial Resource Strain (CARDIA)     Difficulty of Paying Living Expenses: Not very hard   Food Insecurity: No Food Insecurity (5/23/2024)    Received from [facility]    Hunger Vital Sign     Worried About Running Out of Food in the Last Year: Never true     Ran Out of Food in the Last Year: Never true   Transportation Needs: No Transportation Needs (5/23/2024)    Received from [facility]    PRAPARE - Transportation     Lack of Transportation (Medical): No     Lack of Transportation (Non-Medical): No   Physical Activity: Not on file   Stress: Not on file   Social Connections: Not on file   Intimate Partner Violence: Not on file   Housing Stability: Low Risk  (5/23/2024)    Received from [facility]    Housing Stability Vital Sign     Unable to Pay for Housing in the Last Year: No     Number of Places Lived in the Last Year: 1     Unstable Housing in the Last Year: No      Codeine,  Hydrocodone-acetaminophen, Metronidazole, Sulfamethoxazole-trimethoprim, Ibuprofen, and Latex   Current Outpatient Medications   Medication Sig Dispense Refill    aspirin 81 mg EC tablet Take 1 tablet (81 mg) by mouth once daily.      calcium carbonate-vitamin D3 600 mg-20 mcg (800 unit) tablet Take 1 tablet by mouth once daily.      cholecalciferol (Vitamin D-3) 50 mcg (2,000 unit) capsule Take 1 capsule (50 mcg) by mouth early in the morning..      cyanocobalamin (Vitamin B-12) 1,000 mcg tablet Take 1 tablet (1,000 mcg) by mouth once daily.      DILT- mg 24 hr capsule TAKE ONE CAPSULE BY MOUTH EVERY DAY 90 capsule 1    hydroCHLOROthiazide (Microzide) 12.5 mg capsule TAKE ONE CAPSULE BY MOUTH IN THE MORNING 90 capsule 1    methenamine hippurate (Hiprex) 1 gram tablet Take 0.5 tablets (0.5 g) by mouth once daily.      metoprolol succinate XL (Toprol-XL) 100 mg 24 hr tablet Take 1 tablet (100 mg) by mouth once daily. 90 tablet 1    nitrofurantoin, macrocrystal-monohydrate, (Macrobid) 100 mg capsule Take 1 capsule (100 mg) by mouth once daily.      potassium chloride CR 10 mEq ER tablet TAKE ONE TABLET BY MOUTH TWO TIMES A  tablet 1    pravastatin (Pravachol) 40 mg tablet TAKE ONE TABLET BY MOUTH AT BEDTIME 90 tablet 1     No current facility-administered medications for this visit.       Immunization History   Administered Date(s) Administered    Flu vaccine (IIV4), preservative free *Check age/dose* 09/26/2018    Flu vaccine, quadrivalent, high-dose, preservative free, age 65y+ (FLUZONE) 10/13/2020    Flu vaccine, trivalent, preservative free, HIGH-DOSE, age 65y+ (Fluzone) 10/29/2015, 11/17/2017, 09/24/2019, 10/13/2020    Influenza, Seasonal, Quadrivalent, Adjuvanted 10/13/2021, 10/17/2022, 10/23/2023    Influenza, Unspecified 10/01/2015, 09/27/2016, 10/01/2022    Moderna COVID-19 vaccine, 12 years and older (50mcg/0.5mL)(Spikevax) 11/21/2023    Moderna COVID-19 vaccine, bivalent, blue cap/gray label  *Check age/dose* 10/29/2022    Moderna SARS-CoV-2 Vaccination 03/04/2021, 04/01/2021, 12/18/2021, 06/03/2022    Pneumococcal conjugate vaccine, 13-valent (PREVNAR 13) 07/14/2017    Pneumococcal polysaccharide vaccine, 23-valent, age 2 years and older (PNEUMOVAX 23) 07/01/2019        Review of Systems   Constitutional: Negative.    HENT: Negative.     Eyes: Negative.    Respiratory: Negative.     Cardiovascular: Negative.    Gastrointestinal: Negative.    Endocrine: Negative.    Genitourinary: Negative.    Musculoskeletal: Negative.    Skin: Negative.    Allergic/Immunologic: Negative.    Neurological: Negative.    Hematological: Negative.    Psychiatric/Behavioral: Negative.     All other systems reviewed and are negative.       Vitals:    10/21/24 1430   BP: 146/63   Pulse: 53   Temp: 36.4 °C (97.6 °F)     Vitals:    10/21/24 1430   Weight: 49.6 kg (109 lb 6.4 oz)      Physical Exam  Constitutional:       General: She is not in acute distress.     Appearance: Normal appearance.   Cardiovascular:      Rate and Rhythm: Normal rate and regular rhythm.      Pulses: Normal pulses.      Heart sounds: Normal heart sounds. No murmur heard.     No friction rub. No gallop.   Pulmonary:      Effort: Pulmonary effort is normal. No respiratory distress.      Breath sounds: Normal breath sounds. No wheezing or rales.   Neurological:      Mental Status: She is alert.          ASSESSMENT/PLAN: Annual wellness visit.  Check CBC CMP lipid profile TSH vitamin D and vitamin B12.  Recommended flu vaccine.  Recommended getting COVID vaccination booster 3 to 4 months after having had active disease.    Osteoporosis.  Status post left femoral fracture.  Consult rheumatology as noted for further recommendations.  Use calcium 1500 mg daily and vitamin D 2000 units daily.  Try to exercise regularly.    Hypertension with slightly elevated reading.  Continue current medications as noted    Grieving process.  The majority of the office visit  was spent discussing her 's recent death.  She is encouraged to stay active and socialize with family and friends on a regular basis.  Exercise regularly.    Hyperlipidemia.  Check lipid profile.  Continue pravastatin daily.    History of vitamin B12 deficiency.  Check vitamin B12 level.    Follow-up in 6 months and call as needed

## 2024-10-24 ENCOUNTER — TELEPHONE (OUTPATIENT)
Dept: PRIMARY CARE | Facility: CLINIC | Age: 79
End: 2024-10-24
Payer: MEDICARE

## 2024-10-24 NOTE — TELEPHONE ENCOUNTER
Left VM message requesting call back to office for OV follow up.  PCP would like PT to schedule for one month nurse visit for weight check.  PT returned call. Transferred to front for scheduling.

## 2024-10-25 ENCOUNTER — HOSPITAL ENCOUNTER (OUTPATIENT)
Dept: PHYSICAL THERAPY | Age: 79
Setting detail: THERAPIES SERIES
Discharge: HOME | End: 2024-10-25
Payer: MEDICARE

## 2024-10-25 PROCEDURE — 97110 THERAPEUTIC EXERCISES: CPT

## 2024-10-25 PROCEDURE — 97112 NEUROMUSCULAR REEDUCATION: CPT

## 2024-10-25 ASSESSMENT — PAIN SCALES - GENERAL: PAINLEVEL_OUTOF10: 0

## 2024-10-25 NOTE — PROGRESS NOTES
[facility]  Outpatient Physical Therapy   Treatment Note        Date: 10/25/2024  Patient: Terri Sanchez  : 1945   Confirmed: Yes  MRN: [id number]  Referring Provider: Sascha Coelho DO      Medical Diagnosis: Unspecified fracture of shaft of left femur, subsequent encounter for closed fracture with delayed healing [S78.064Z]      Treatment Diagnosis: impaired gait, impaired LE strength    Visit Information:  Insurance: Payor: [employer] MEDIC[employer] ADVANTAGE / Plan: MED[employer] ADVANTAGE PREFERRED PPO / Product Type: *No Product type* /   PT Visit Information  PT Insurance Information: Medical [place] Medicare  Total # of Visits Approved:  (BMN)  Total # of Visits to Date:   Plan of Care/Certification Expiration Date: 24  No Show: 0  Progress Note Due Date: 11/15/24  Canceled Appointment: 0  Progress Note Counter: 3/8    Subjective Information:  Subjective: Patient reports she still needs to use her cane with walking.  HEP Compliance:  [x] Good [] Fair [] Poor [] Reports not doing due to:             Pain Screening  Patient Currently in Pain: No  Pain Assessment: 0-10  Pain Level: 0    Treatment:  Exercises:  Exercises  Exercise 1: SciFit level 3.5 x 8 minutes  Exercise 2: standing 4-way hip YTB x12 ea B  Exercise 4: TKE with YTB x 15  Exercise 10: gait drills: with 2# ankle weight forward, lateral, retro, March( F/R) x 3 laps  Exercise 11: step ups 4'' F/L focus on TKE and decreasing UE pull x10 ea, step downs 4'' step x 10  Exercise 12: ambulation over 6'' hurdles F/L x3 laps ea  Exercise 13: foam: FA, FT, EC, head turns, semi tandem, toe taps  Exercise 15: toe taps 6'' x 10 minutes with one UE support with 2#weight  Exercise 18: standing circles 2# weight x 10 cw, ccw  Exercise 20: HEP: continue with current       *Indicates exercise, modality, or manual techniques to be initiated when appropriate      Assessment:   Body Structures, Functions, Activity Limitations Requiring

## 2024-10-29 ENCOUNTER — LAB (OUTPATIENT)
Dept: LAB | Facility: LAB | Age: 79
End: 2024-10-29
Payer: MEDICARE

## 2024-10-29 DIAGNOSIS — E78.5 DYSLIPIDEMIA: ICD-10-CM

## 2024-10-29 DIAGNOSIS — I10 PRIMARY HYPERTENSION: ICD-10-CM

## 2024-10-29 DIAGNOSIS — M89.9 DISORDER OF BONE, UNSPECIFIED: ICD-10-CM

## 2024-10-29 DIAGNOSIS — M80.00XD AGE-RELATED OSTEOPOROSIS WITH CURRENT PATHOLOGICAL FRACTURE WITH ROUTINE HEALING, SUBSEQUENT ENCOUNTER: ICD-10-CM

## 2024-10-29 DIAGNOSIS — E53.8 VITAMIN B12 DEFICIENCY: ICD-10-CM

## 2024-10-29 DIAGNOSIS — M81.0 OSTEOPOROSIS, UNSPECIFIED OSTEOPOROSIS TYPE, UNSPECIFIED PATHOLOGICAL FRACTURE PRESENCE: ICD-10-CM

## 2024-10-29 LAB
25(OH)D3 SERPL-MCNC: 47 NG/ML (ref 30–100)
ALBUMIN SERPL BCP-MCNC: 4 G/DL (ref 3.4–5)
ALP SERPL-CCNC: 87 U/L (ref 33–136)
ALT SERPL W P-5'-P-CCNC: 9 U/L (ref 7–45)
ANION GAP SERPL CALC-SCNC: 11 MMOL/L (ref 10–20)
AST SERPL W P-5'-P-CCNC: 13 U/L (ref 9–39)
BILIRUB SERPL-MCNC: 1.4 MG/DL (ref 0–1.2)
BUN SERPL-MCNC: 15 MG/DL (ref 6–23)
CALCIUM SERPL-MCNC: 9.5 MG/DL (ref 8.6–10.3)
CHLORIDE SERPL-SCNC: 102 MMOL/L (ref 98–107)
CHOLEST SERPL-MCNC: 194 MG/DL (ref 0–199)
CHOLESTEROL/HDL RATIO: 2.8
CO2 SERPL-SCNC: 31 MMOL/L (ref 21–32)
CREAT SERPL-MCNC: 0.8 MG/DL (ref 0.5–1.05)
EGFRCR SERPLBLD CKD-EPI 2021: 75 ML/MIN/1.73M*2
ERYTHROCYTE [DISTWIDTH] IN BLOOD BY AUTOMATED COUNT: 15.1 % (ref 11.5–14.5)
GLUCOSE SERPL-MCNC: 97 MG/DL (ref 74–99)
HCT VFR BLD AUTO: 39.5 % (ref 36–46)
HDLC SERPL-MCNC: 68.9 MG/DL
HGB BLD-MCNC: 12.6 G/DL (ref 12–16)
LDLC SERPL CALC-MCNC: 94 MG/DL
MCH RBC QN AUTO: 29.8 PG (ref 26–34)
MCHC RBC AUTO-ENTMCNC: 31.9 G/DL (ref 32–36)
MCV RBC AUTO: 93 FL (ref 80–100)
NON HDL CHOLESTEROL: 125 MG/DL (ref 0–149)
NRBC BLD-RTO: 0 /100 WBCS (ref 0–0)
PLATELET # BLD AUTO: 294 X10*3/UL (ref 150–450)
POTASSIUM SERPL-SCNC: 4 MMOL/L (ref 3.5–5.3)
PROT SERPL-MCNC: 6.7 G/DL (ref 6.4–8.2)
RBC # BLD AUTO: 4.23 X10*6/UL (ref 4–5.2)
SODIUM SERPL-SCNC: 140 MMOL/L (ref 136–145)
TRIGL SERPL-MCNC: 156 MG/DL (ref 0–149)
VIT B12 SERPL-MCNC: 394 PG/ML (ref 211–911)
VLDL: 31 MG/DL (ref 0–40)
WBC # BLD AUTO: 6.5 X10*3/UL (ref 4.4–11.3)

## 2024-10-29 PROCEDURE — 80061 LIPID PANEL: CPT

## 2024-10-29 PROCEDURE — 82607 VITAMIN B-12: CPT

## 2024-10-29 PROCEDURE — 85027 COMPLETE CBC AUTOMATED: CPT

## 2024-10-29 PROCEDURE — 36415 COLL VENOUS BLD VENIPUNCTURE: CPT

## 2024-10-29 PROCEDURE — 80053 COMPREHEN METABOLIC PANEL: CPT

## 2024-10-29 PROCEDURE — 82306 VITAMIN D 25 HYDROXY: CPT

## 2024-10-30 ENCOUNTER — HOSPITAL ENCOUNTER (OUTPATIENT)
Dept: PHYSICAL THERAPY | Age: 79
Setting detail: THERAPIES SERIES
Discharge: HOME | End: 2024-10-30
Payer: MEDICARE

## 2024-10-30 DIAGNOSIS — D64.9 ANEMIA, UNSPECIFIED TYPE: Primary | ICD-10-CM

## 2024-10-30 PROCEDURE — 97112 NEUROMUSCULAR REEDUCATION: CPT

## 2024-10-30 PROCEDURE — 97110 THERAPEUTIC EXERCISES: CPT

## 2024-10-30 ASSESSMENT — PAIN DESCRIPTION - ORIENTATION: ORIENTATION: LEFT

## 2024-10-30 ASSESSMENT — PAIN DESCRIPTION - DESCRIPTORS: DESCRIPTORS: TIGHTNESS

## 2024-10-30 ASSESSMENT — PAIN DESCRIPTION - LOCATION: LOCATION: HIP

## 2024-10-30 ASSESSMENT — PAIN SCALES - GENERAL: PAINLEVEL_OUTOF10: 1

## 2024-10-30 NOTE — PROGRESS NOTES
[facility]  Outpatient Physical Therapy    Treatment Note        Date: 10/30/2024  Patient: Terri Sanchez  : 1945   Confirmed: Yes  MRN: [id number]  Referring Provider: Sascha Coelho DO    Medical Diagnosis: Unspecified fracture of shaft of left femur, subsequent encounter for closed fracture with delayed healing [S76.151L]       Treatment Diagnosis: impaired gait, impaired LE strength    Visit Information:  Insurance: Payor: [employer] MEDIC[employer] ADVANTAGE / Plan: MED[employer] ADVANTAGE PREFERRED PPO / Product Type: *No Product type* /   PT Visit Information  PT Insurance Information: Medical [place] Medicare  Total # of Visits Approved:  (BMN)  Total # of Visits to Date: 12  Plan of Care/Certification Expiration Date: 24  No Show: 0  Progress Note Due Date: 11/15/24  Canceled Appointment: 0  Progress Note Counter:     Subjective Information:  Subjective: Patient reports running alot of errands on Monday and Tuesday, tightness and Lt LE heaviness today.  HEP Compliance:  [x] Good [] Fair [] Poor [] Reports not doing due to:               Pain Screening  Patient Currently in Pain: Yes  Pain Level: 1  Pain Location: Hip  Pain Orientation: Left  Pain Descriptors: Tightness    Treatment:  Exercises:  Exercises  Exercise 1: SciFit level 4.0 x 6 minutes  Exercise 5: NDT: toe taps on 6'' step, pivots onto, static standing, bicycles on 4'' step  Exercise 7: Hamstring, hip flexor str 3/20secs Hasmukh at steps  Exercise 10: Gait with focus on no UE support: Lateral, retro x2 laps ea  Exercise 11: 6'' step ups forward at steps, varying UE support (ease with support on the Rt)  Exercise 13: Foam: static stand FT/FA, head turns, posture ex's  Exercise 19: 2Min step test: 66 step (decreased range on Lt LE)  Exercise 20: HEP: continue with current         *Indicates exercise, modality, or manual techniques to be initiated when appropriate         Assessment:   Body Structures, Functions, Activity

## 2024-10-31 ENCOUNTER — TELEPHONE (OUTPATIENT)
Dept: PRIMARY CARE | Facility: CLINIC | Age: 79
End: 2024-10-31
Payer: MEDICARE

## 2024-11-01 ENCOUNTER — HOSPITAL ENCOUNTER (OUTPATIENT)
Dept: PHYSICAL THERAPY | Age: 79
Setting detail: THERAPIES SERIES
Discharge: HOME | End: 2024-11-01
Payer: MEDICARE

## 2024-11-01 PROCEDURE — 97112 NEUROMUSCULAR REEDUCATION: CPT

## 2024-11-01 PROCEDURE — 97110 THERAPEUTIC EXERCISES: CPT

## 2024-11-01 NOTE — PROGRESS NOTES
[facility]  Outpatient Physical Therapy    Treatment Note        Date: 2024  Patient: Terri Sanchez  : 1945   Confirmed: Yes  MRN: [id number]  Referring Provider: Sascha Coelho DO    Medical Diagnosis: Unspecified fracture of shaft of left femur, subsequent encounter for closed fracture with delayed healing [H90.043X]       Treatment Diagnosis: impaired gait, impaired LE strength    Visit Information:  Insurance: Payor: [employer] MEDICARE ADVANTAGE / Plan: MED[employer] ADVANTAGE PREFERRED PPO / Product Type: *No Product type* /   PT Visit Information  PT Insurance Information: Medical [place] Medicare  Total # of Visits Approved:  (BMN)  Total # of Visits to Date: 13  Plan of Care/Certification Expiration Date: 24  No Show: 0  Progress Note Due Date: 11/15/24  Canceled Appointment: 0  Progress Note Counter:     Subjective Information:  Subjective: Pt reports no soreness after resting a few days  HEP Compliance:  [x] Good [] Fair [] Poor [] Reports not doing due to:    Pain Screening  Patient Currently in Pain: Denies    Treatment:  Exercises:  Exercises  Exercise 1: UBE for LEs x 5', L4  Exercise 2: standing 3-way hip YTB x15 ea B  Exercise 4: Lt TKE with YTB x 15  Exercise 5: NDT: toe taps on 6'' step, pivots onto, SLS supported  Exercise 7: Hamstring, hip flexor str 3/20secs Hasmukh at steps  Exercise 10: Gait with focus on no UE support: Lateral, retro, march x2 laps ea  Exercise 11: 6'' step ups forward at steps, varying UE support (ease with support on the Rt)  Exercise 12: ambulation over 6'' hurdles F/L x2 laps ea  Exercise 20: HEP: continue with current     *Indicates exercise, modality, or manual techniques to be initiated when appropriate    Objective Measures:      Strength: [x] NT  [] MMT completed:     ROM: [] NT  [x] ROM measurements:     AROM LLE (degrees)  L Knee Flexion (0-145): 113 deg          PROM LLE (degrees)  L SLR: 65 deg         LTG 1 Current

## 2024-11-06 ENCOUNTER — HOSPITAL ENCOUNTER (OUTPATIENT)
Dept: PHYSICAL THERAPY | Age: 79
Setting detail: THERAPIES SERIES
Discharge: HOME | End: 2024-11-06
Payer: MEDICARE

## 2024-11-06 PROCEDURE — 97112 NEUROMUSCULAR REEDUCATION: CPT

## 2024-11-06 PROCEDURE — 97110 THERAPEUTIC EXERCISES: CPT

## 2024-11-06 NOTE — PROGRESS NOTES
current HEP.  See objective section for any therapeutic exercise changes, additions or modifications this date.    Therapy Time:      PT Individual Minutes  Time In: 1003  Time Out: 1058  Minutes: 55  Timed Code Treatment Minutes: 55 Minutes  Procedure Minutes:0  Timed Activity Minutes Units   Ther Ex 25 2   Neuro Jonh 30 2     Electronically signed by Stella Cedeño PTA on 11/6/24 at 9:19 AM EST

## 2024-11-08 ENCOUNTER — HOSPITAL ENCOUNTER (OUTPATIENT)
Dept: PHYSICAL THERAPY | Age: 79
Setting detail: THERAPIES SERIES
Discharge: HOME | End: 2024-11-08
Payer: MEDICARE

## 2024-11-08 PROCEDURE — 97110 THERAPEUTIC EXERCISES: CPT

## 2024-11-08 PROCEDURE — 97112 NEUROMUSCULAR REEDUCATION: CPT

## 2024-11-08 NOTE — PROGRESS NOTES
LTG 1 Current Status:: 11/8: Llt knee flex= 116deg, SLR@ 65 with difficulty attaining PROM        LTG 4 Current Status:: 11/8:  Ruano balance: 45/56       Assessment:   Body Structures, Functions, Activity Limitations Requiring Skilled Therapeutic Intervention: Decreased functional mobility , Decreased ROM, Decreased strength, Decreased endurance, Decreased balance, Increased pain  Assessment: Pt expresses thjat she feels she neds more therapy for functional strength  and daily activies such as House keeping tasks.Pt is making progress towards static balance meeting goal @ 45/56 for Ruano. Pt met Lt knee flexion goal this adte @ 116 deg. Pt challenged by dynamic activites w/o SC, demonstrating antalgic patterns.Pt would benefit from continued therapy to progress strength/rom  and balance.  Treatment Diagnosis: impaired gait, impaired LE strength     Post-Pain Assessment:       Pain Rating (0-10 pain scale):   0/10   Location and pain description same as pre-treatment unless indicated.   Action: [x] NA   [] Perform HEP  [] Meds as prescribed  [] Modalities as prescribed   [] Call Physician     GOALS   Patient Goal(s): Patient Goals : \"stronger legs\"    Short Term Goals Completed by 3 weeks Goal Status   STG 1 Patient will be independent with HEP. In progress     Long Term Goals Completed by 4 weeks Goal Status   LTG 1 Patient will increase left knee flexion >/= 110 degrees and bilateral SLR >/= 70 degrees for improved functional tolerance. In progress, Partially met   LTG 2 Patient will increase strength in bilateral LEs >/= 4+/5 for improved standing tolerance. In progress   LTG 3 Patient will ambulate with LRD independently 150ft with improved bilateral step length and symmetry. Partially met, In progress   LTG 4 Ruano balance >/= 45/56 to decrease fall risk. Met     Plan:  Frequency/Duration:  Plan  Plan Frequency: 2  Plan weeks: 4  Specific Instructions for Next Treatment: POC NV  Current Treatment

## 2024-11-14 ENCOUNTER — HOSPITAL ENCOUNTER (OUTPATIENT)
Dept: PHYSICAL THERAPY | Age: 79
Setting detail: THERAPIES SERIES
Discharge: HOME | End: 2024-11-14
Payer: MEDICARE

## 2024-11-14 PROCEDURE — 97110 THERAPEUTIC EXERCISES: CPT

## 2024-11-14 PROCEDURE — 97112 NEUROMUSCULAR REEDUCATION: CPT

## 2024-11-14 ASSESSMENT — PAIN SCALES - GENERAL: PAINLEVEL_OUTOF10: 0

## 2024-11-14 NOTE — PROGRESS NOTES
standing tolerance. In progress   LTG 3 Patient will ambulate with LRD independently 150ft with improved bilateral step length and symmetry. Partially met, In progress   LTG 4 Ruano balance >/= 45/56 to decrease fall risk. Met   LTG 5 DGI >/= 15/24 to demonstrate improved dynamic balance. Updated       Plan:  Frequency/Duration:  Plan  Plan Frequency: 2  Plan weeks: 4  Current Treatment Recommendations: Strengthening, ROM, Balance training, Functional mobility training, Gait training, Stair training, Neuromuscular re-education, Home exercise program, Safety education & training, Patient/Caregiver education & training, Equipment evaluation, education, & procurement, Modalities  Modalities: Heat/Cold  Additional Comments: additional visits  Pt to continue current HEP.  See objective section for any therapeutic exercise changes, additions or modifications this date.    Therapy Time:      PT Individual Minutes  Time In: 1003  Time Out: 1100  Minutes: 57  Timed Code Treatment Minutes: 57 Minutes  Procedure Minutes:0 minutes  Timed Activity Minutes Units   Ther Ex 25 2   Manual      Neuro Jonh 32 2     Electronically signed by Catrina Bergeron, PT on 11/14/24 at 11:12 AM EST

## 2024-11-14 NOTE — PLAN OF CARE
[facility]  PHYSICAL THERAPY PLAN OF CARE                                    [facility] [address] [place] [address]     Ph: [phone]  Fax: [phone]    [] Certification  [x] Recertification [x]  Plan of Care  [] Progress Note [] Discharge      Referring Provider: Sascha Coelho DO    From:  Catrina Bergeron, PT     Patient: Terri Sanchez (78 y.o. female) : 1945 Date: 2024   Medical Diagnosis: Unspecified fracture of shaft of left femur, subsequent encounter for closed fracture with delayed healing [S72.302G]    Treatment Diagnosis: impaired gait, impaired LE strength    Plan of Care/Certification Expiration Date: 24   Progress Report Period from: 10/15/2024  to 2024    Visits to Date: 15 No Show: 0 Cancelled Appts: 0    OBJECTIVE:   Short Term Goals - Time Frame for Short Term Goals: 3 weeks    Goals Current/Discharge status  Status   Short Term Goal 1: Patient will be independent with HEP.  STG 1 Current Status::  independent with HEP   In progress     Long Term Goals - Time Frame for Long Term Goals : 4 weeks  Goals Current/ Discharge status Status   Long Term Goal 1: Patient will increase left knee flexion >/= 110 degrees and bilateral SLR >/= 70 degrees for improved functional tolerance. LTG 1 Current Status::  see ROM     AROM LLE (degrees)  L Knee Flexion (0-145): 115 deg          PROM LLE (degrees)  L SLR: 65 deg   PROM RLE (degrees)  R SLR: 45 deg                    In progress, Partially met   Long Term Goal 2: Patient will increase strength in bilateral LEs >/= 4+/5 for improved standing tolerance. LTG 2 Current Status:: 11/15 see strength  Strength RLE  R Hip Flexion: 4/5  R Hip ABduction: 3+/5  R Knee Flexion: 4+/5  R Knee Extension: 5/5  R Ankle Dorsiflexion: 5/5  Strength LLE  L Hip Flexion: 4-/5  L Hip ABduction: 3/5  L Knee Flexion: 4/5  L Knee Extension: 4+/5  L Ankle Dorsiflexion: 4/5            In progress   Long Term Goal 3: Patient will

## 2024-11-18 ENCOUNTER — HOSPITAL ENCOUNTER (OUTPATIENT)
Dept: PHYSICAL THERAPY | Age: 79
Setting detail: THERAPIES SERIES
Discharge: HOME | End: 2024-11-18
Payer: MEDICARE

## 2024-11-18 PROCEDURE — 97110 THERAPEUTIC EXERCISES: CPT

## 2024-11-18 NOTE — PROGRESS NOTES
[facility]  Outpatient Physical Therapy    Treatment Note        Date: 2024  Patient: Terri Sanchez  : 1945   Confirmed: Yes  MRN: [id number]  Referring Provider: Sascha Coelho DO    Medical Diagnosis: Unspecified fracture of shaft of left femur, subsequent encounter for closed fracture with delayed healing [G55.561K]       Treatment Diagnosis: impaired gait, impaired LE strength    Visit Information:  Insurance: Payor: [employer] MEDIC[employer] ADVANTAGE / Plan: MED[employer] ADVANTAGE PREFERRED PPO / Product Type: *No Product type* /   PT Visit Information  PT Insurance Information: Medical [place] Medicare  Total # of Visits Approved:  (BMN)  Total # of Visits to Date: 16  Plan of Care/Certification Expiration Date: 24  No Show: 0  Progress Note Due Date: 24  Canceled Appointment: 0  Progress Note Counter:     Subjective Information:  Subjective: \"my left hip is hurting intermittently when I change directions while standing\"  HEP Compliance:  [x] Good [] Fair [] Poor [] Reports not doing due to:    Pain Screening  Patient Currently in Pain: Denies    Treatment:  Exercises:  Exercises  Exercise 1: Scitfit level 4 for 6 min  Exercise 2: prone hip ext X 10; prone knee flexion X 10 R/L  Exercise 3: glut medius against wall- isometric press against wall 3 sec X 10 R/L; glut medius lean on wall using small ball X 10 R/L  Exercise 7: seated hamstring stretch 30 sec hold x 3 R/L; modified yumiko stretch with strap 30 sec X 3 R/L  Exercise 12: ambulation over 6'' hurdles F/L x2 laps ea- with L UE  Exercise 14: side step without UE support X 6 reps  Exercise 20: HEP: continue with current  *Indicates exercise, modality, or manual techniques to be initiated when appropriate    Objective Measures:   Strength: [x] NT  [] MMT completed:    ROM: [x] NT  [] ROM measurements:    Assessment:   Body Structures, Functions, Activity Limitations Requiring Skilled Therapeutic Intervention:

## 2024-11-20 ENCOUNTER — APPOINTMENT (OUTPATIENT)
Dept: PRIMARY CARE | Facility: CLINIC | Age: 79
End: 2024-11-20
Payer: MEDICARE

## 2024-11-21 ENCOUNTER — HOSPITAL ENCOUNTER (OUTPATIENT)
Dept: PHYSICAL THERAPY | Age: 79
Setting detail: THERAPIES SERIES
Discharge: HOME | End: 2024-11-21
Payer: MEDICARE

## 2024-11-21 PROCEDURE — 97110 THERAPEUTIC EXERCISES: CPT

## 2024-11-21 PROCEDURE — 97112 NEUROMUSCULAR REEDUCATION: CPT

## 2024-11-21 NOTE — PROGRESS NOTES
[facility]  Outpatient Physical Therapy    Treatment Note        Date: 2024  Patient: Terri Sanchez  : 1945   Confirmed: Yes  MRN: [id number]  Referring Provider: Sascha Coelho DO    Medical Diagnosis: Unspecified fracture of shaft of left femur, subsequent encounter for closed fracture with delayed healing [J63.430K]       Treatment Diagnosis: impaired gait, impaired LE strength    Visit Information:  Insurance: Payor: [employer] MEDIC[employer] ADVANTAGE / Plan: MED[employer] ADVANTAGE PREFERRED PPO / Product Type: *No Product type* /   PT Visit Information  PT Insurance Information: Medical [place] Medicare  Total # of Visits Approved:  (BMN)  Total # of Visits to Date: 17  Plan of Care/Certification Expiration Date: 24  No Show: 0  Progress Note Due Date: 24  Canceled Appointment: 0  Progress Note Counter:     Subjective Information:  Subjective: Patient reports intermittent vision changes in Lt eye such as cloudiness, Will be seeing Optometry soon. Patient reports getting a workout last visit.  HEP Compliance:  [x] Good [] Fair [] Poor [] Reports not doing due to:               Pain Screening  Patient Currently in Pain: No    Treatment:  Exercises:  Exercises  Exercise 1: Scitfit level 4.5 for 6 min  Exercise 2: prone hip ext X 10; prone knee flexion X 10 R/L, prone heel squeezes 3'' x10  Exercise 3: glut medius against wall- isometric press against wall 3 sec X 10 Lt LE, Rt foot elevated on box with iso into ball x10  Exercise 5: NDT pivots on 6'' box with 2# weight on Rt LE x10 Hasmukh  Exercise 6: Seated lateral hip flexion over ailyn 2# ankle weight 2sets of 10 reps  Exercise 8: STS from lower mat x10, NDT Rt foot on 4'' box x10  Exercise 9: Hip hikes on 2'' step 2sets of 10 reps Hasmukh  Exercise 10: Gait: 2# ankle weight on Rt: March and holds, lateral march, lateral with mini squats  x 2 laps, forward without weight with focus on Lt SLS stance time  Exercise 15:

## 2024-11-26 ENCOUNTER — HOSPITAL ENCOUNTER (OUTPATIENT)
Dept: PHYSICAL THERAPY | Age: 79
Setting detail: THERAPIES SERIES
Discharge: HOME | End: 2024-11-26
Payer: MEDICARE

## 2024-11-26 PROCEDURE — 97112 NEUROMUSCULAR REEDUCATION: CPT

## 2024-11-26 PROCEDURE — 97110 THERAPEUTIC EXERCISES: CPT

## 2024-11-26 NOTE — PROGRESS NOTES
[facility]  Outpatient Physical Therapy   Treatment Note        Date: 2024  Patient: Terri Sanchez  : 1945   Confirmed: Yes  MRN: [id number]  Referring Provider: Sascha Coelho DO      Medical Diagnosis: Unspecified fracture of shaft of left femur, subsequent encounter for closed fracture with delayed healing [S70.095Y]      Treatment Diagnosis: impaired gait, impaired LE strength    Visit Information:  Insurance: Payor: [employer] ADVANTAGE / Plan: [employer] ADVANT[employer] PREFERRED PPO / Product Type: *No Product type* /   PT Visit Information  PT Insurance Information: Medical [place] Medicare  Total # of Visits Approved:  (BMN)  Total # of Visits to Date: 17  Plan of Care/Certification Expiration Date: 24  No Show: 0  Progress Note Due Date: 24  Canceled Appointment: 0  Progress Note Counter:     Subjective Information:  Subjective: Pt reports feeling well over all, but feels stiff in the mornings. But not having pain.  HEP Compliance:  [x] Good [] Fair [] Poor [] Reports not doing due to:    Pain Screening  Patient Currently in Pain: No    Treatment:  Exercises:  Exercises  Exercise 1: Scitfit level 4.5 for 6 min  Exercise 2: Sink ex //bars: Hip ABD/EXT/Flex YTB x10-15, Heel/toe raises on oval foam x10, Squats x10  Exercise 3: Seated ball squeezes x20  Exercise 4: Ambulation without cane focused on heel strike x2 laps  Exercise 7: Standing L hip flexor stretch 30s x3  Exercise 8: STS from lower mat x15-20, Right foot on 4\"  Exercise 14: Lateral step over 12inch ailyn x10 to mimic bathtub  Exercise 15: BOSU SLS 30s x2 HANANE, Quick Step taps 30s, step tap and hold 3sec x10*     *Indicates exercise, modality, or manual techniques to be initiated when appropriate    Objective Measures:     Assessment:   Body Structures, Functions, Activity Limitations Requiring Skilled Therapeutic Intervention: Decreased functional mobility , Decreased ROM, Decreased

## 2024-11-27 ENCOUNTER — APPOINTMENT (OUTPATIENT)
Dept: PRIMARY CARE | Facility: CLINIC | Age: 79
End: 2024-11-27
Payer: MEDICARE

## 2024-11-27 VITALS — WEIGHT: 108.8 LBS | BODY MASS INDEX: 21.25 KG/M2

## 2024-11-27 NOTE — PROGRESS NOTES
Patient in office for weight check, ordered and supervised by Dr. Osborne. Patient's weight: 108.8 lbs

## 2024-11-29 ENCOUNTER — HOSPITAL ENCOUNTER (OUTPATIENT)
Dept: PHYSICAL THERAPY | Age: 79
Setting detail: THERAPIES SERIES
Discharge: HOME | End: 2024-11-29
Payer: MEDICARE

## 2024-11-29 PROCEDURE — 97110 THERAPEUTIC EXERCISES: CPT

## 2024-11-29 PROCEDURE — 97112 NEUROMUSCULAR REEDUCATION: CPT

## 2024-11-29 ASSESSMENT — PAIN SCALES - GENERAL: PAINLEVEL_OUTOF10: 0

## 2024-11-29 NOTE — PROGRESS NOTES
[facility]  Outpatient Physical Therapy   Treatment Note        Date: 2024  Patient: Terri Sanchez  : 1945   Confirmed: Yes  MRN: [id number]  Referring Provider: Sascha Coelho DO      Medical Diagnosis: Unspecified fracture of shaft of left femur, subsequent encounter for closed fracture with delayed healing [Q64.262R]      Treatment Diagnosis: impaired gait, impaired LE strength    Visit Information:  Insurance: Payor: [employer] MEDIC[employer] ADVANTAGE / Plan: MED[employer] ADVANTAGE PREFERRED PPO / Product Type: *No Product type* /   PT Visit Information  PT Insurance Information: Medical [place] Medicare  Total # of Visits Approved:  (BMN)  Total # of Visits to Date: 18  Plan of Care/Certification Expiration Date: 24  No Show: 0  Progress Note Due Date: 24  Canceled Appointment: 0  Progress Note Counter: 3/8    Subjective Information:  Subjective: Patient reports some stiffness in her left hip this date after standing alot on Thanksgiving.  HEP Compliance:  [x] Good [] Fair [] Poor [] Reports not doing due to:             Pain Screening  Patient Currently in Pain: Denies  Pain Assessment: 0-10  Pain Level: 0    Treatment:  Exercises:  Exercises  Exercise 1: Scitfit level 4.5 for 7 min  Exercise 2: sink ex // bars with 2# ankle weight: hip abduction, marching, hip extension, hip circles  Exercise 3: seated lateral step overs 6'' x 15 with 2 #ankle weight  Exercise 4: Ambulation without cane focused on heel strike x2 laps  Exercise 8: STS from chair x 15 without UE support  Exercise 9: Hip hikes on 4'' step with 2# weight x 15 reps Hasmukh  Exercise 13: foam standing: SLS with UE support 10 seconds  Exercise 14: Lateral step over 12inch ailyn x 20 to mimic bathtub  Exercise 15: BOSU lateral mini lunges x 15, BOSU step tap x 15  Exercise 20: HEP: continue with current       *Indicates exercise, modality, or manual techniques to be initiated when appropriate    Objective

## 2024-12-03 ENCOUNTER — HOSPITAL ENCOUNTER (OUTPATIENT)
Dept: PHYSICAL THERAPY | Age: 79
Setting detail: THERAPIES SERIES
Discharge: HOME | End: 2024-12-03
Payer: MEDICARE

## 2024-12-03 PROCEDURE — 97112 NEUROMUSCULAR REEDUCATION: CPT

## 2024-12-03 PROCEDURE — 97110 THERAPEUTIC EXERCISES: CPT

## 2024-12-03 NOTE — PROGRESS NOTES
[facility]  Outpatient Physical Therapy    Treatment Note        Date: 12/3/2024  Patient: Terri Sanchez  : 1945   Confirmed: Yes  MRN: [id number]  Referring Provider: Sascha Coelho DO    Medical Diagnosis: Unspecified fracture of shaft of left femur, subsequent encounter for closed fracture with delayed healing [A53.161O]       Treatment Diagnosis: impaired gait, impaired LE strength    Visit Information:  Insurance: Payor: [employer]IC[employer] ADVANTAGE / Plan: MED[employer] ADVANTAGE PREFERRED PPO / Product Type: *No Product type* /   PT Visit Information  PT Insurance Information: Medical [place] Medicare  Total # of Visits Approved:  (BMN)  Total # of Visits to Date:   Plan of Care/Certification Expiration Date: 24  No Show: 0  Progress Note Due Date: 24  Canceled Appointment: 0  Progress Note Counter:     Subjective Information:  Subjective: Pt reports she was able to get into the car w/o self help for Lt leg and drove to therapy  HEP Compliance:  [x] Good [] Fair [] Poor [] Reports not doing due to:      Pain Screening  Patient Currently in Pain: Denies    Treatment:  Exercises:  Exercises  Exercise 1: UBE level 4.5<> 4.6 for 7 min  Exercise 2: sink ex // bars with 2# ankle weight: hip abduction, marching, hip extension, hip circles, Knee flex x 15 reps  Exercise 3: seated lateral step overs 6'' x 15 with 2 #ankle weight  Exercise 9: Hip hikes on 4'' step with 2# weight x 15 reps Hasmukh  Exercise 14: Lateral step over 12inch ailyn x 20 to mimic bathtub  Exercise 15: BOSU lateral mini lunges x 15, BOSU step tap x 15  Exercise 19: DGI=14, up 5 pts  Exercise 20: HEP: continue with current     *Indicates exercise, modality, or manual techniques to be initiated when appropriate    Objective Measures:    Strength: [x] NT  [] MMT completed:     ROM: [x] NT  [] ROM measurements:      LTG 3 Current Status:: 12/3: antalgic pattern,  ambulates without AD with decreased stance

## 2024-12-06 ENCOUNTER — HOSPITAL ENCOUNTER (OUTPATIENT)
Dept: PHYSICAL THERAPY | Age: 79
Setting detail: THERAPIES SERIES
Discharge: HOME | End: 2024-12-06
Payer: MEDICARE

## 2024-12-06 PROCEDURE — 97112 NEUROMUSCULAR REEDUCATION: CPT

## 2024-12-06 PROCEDURE — 97110 THERAPEUTIC EXERCISES: CPT

## 2024-12-06 NOTE — PROGRESS NOTES
balance. In progress     Plan:  Frequency/Duration:  Plan  Plan Frequency: 2  Plan weeks: 4  Current Treatment Recommendations: Strengthening, ROM, Balance training, Functional mobility training, Gait training, Stair training, Neuromuscular re-education, Home exercise program, Safety education & training, Patient/Caregiver education & training, Equipment evaluation, education, & procurement, Modalities  Modalities: Heat/Cold  Additional Comments: D/C next week  Pt to continue current HEP.  See objective section for any therapeutic exercise changes, additions or modifications this date.    Therapy Time:      PT Individual Minutes  Time In: 0800  Time Out: 0857  Minutes: 57  Timed Code Treatment Minutes: 57 Minutes  Procedure Minutes:0  Timed Activity Minutes Units   Ther Ex 47 3   Neuro Jonh 10 1     Electronically signed by Jennifer Santana PTA on 12/6/24 at 9:35 AM EST

## 2024-12-09 ENCOUNTER — LAB (OUTPATIENT)
Dept: LAB | Facility: LAB | Age: 79
End: 2024-12-09
Payer: MEDICARE

## 2024-12-09 DIAGNOSIS — D64.9 ANEMIA, UNSPECIFIED TYPE: ICD-10-CM

## 2024-12-09 LAB
BASOPHILS # BLD AUTO: 0.07 X10*3/UL (ref 0–0.1)
BASOPHILS NFR BLD AUTO: 1 %
EOSINOPHIL # BLD AUTO: 0.28 X10*3/UL (ref 0–0.4)
EOSINOPHIL NFR BLD AUTO: 4.1 %
ERYTHROCYTE [DISTWIDTH] IN BLOOD BY AUTOMATED COUNT: 13 % (ref 11.5–14.5)
FERRITIN SERPL-MCNC: 33 NG/ML (ref 8–150)
HCT VFR BLD AUTO: 41.8 % (ref 36–46)
HGB BLD-MCNC: 13.3 G/DL (ref 12–16)
IMM GRANULOCYTES # BLD AUTO: 0.02 X10*3/UL (ref 0–0.5)
IMM GRANULOCYTES NFR BLD AUTO: 0.3 % (ref 0–0.9)
IRON SATN MFR SERPL: 28 % (ref 25–45)
IRON SERPL-MCNC: 96 UG/DL (ref 35–150)
LYMPHOCYTES # BLD AUTO: 1.96 X10*3/UL (ref 0.8–3)
LYMPHOCYTES NFR BLD AUTO: 28.7 %
MCH RBC QN AUTO: 30.1 PG (ref 26–34)
MCHC RBC AUTO-ENTMCNC: 31.8 G/DL (ref 32–36)
MCV RBC AUTO: 95 FL (ref 80–100)
MONOCYTES # BLD AUTO: 0.55 X10*3/UL (ref 0.05–0.8)
MONOCYTES NFR BLD AUTO: 8.1 %
NEUTROPHILS # BLD AUTO: 3.94 X10*3/UL (ref 1.6–5.5)
NEUTROPHILS NFR BLD AUTO: 57.8 %
NRBC BLD-RTO: 0 /100 WBCS (ref 0–0)
PLATELET # BLD AUTO: 267 X10*3/UL (ref 150–450)
RBC # BLD AUTO: 4.42 X10*6/UL (ref 4–5.2)
TIBC SERPL-MCNC: 349 UG/DL (ref 240–445)
UIBC SERPL-MCNC: 253 UG/DL (ref 110–370)
WBC # BLD AUTO: 6.8 X10*3/UL (ref 4.4–11.3)

## 2024-12-09 PROCEDURE — 36415 COLL VENOUS BLD VENIPUNCTURE: CPT

## 2024-12-09 PROCEDURE — 85025 COMPLETE CBC W/AUTO DIFF WBC: CPT

## 2024-12-09 PROCEDURE — 83540 ASSAY OF IRON: CPT

## 2024-12-09 PROCEDURE — 82728 ASSAY OF FERRITIN: CPT

## 2024-12-09 PROCEDURE — 83550 IRON BINDING TEST: CPT

## 2024-12-11 ENCOUNTER — HOSPITAL ENCOUNTER (OUTPATIENT)
Dept: PHYSICAL THERAPY | Age: 79
Setting detail: THERAPIES SERIES
Discharge: HOME | End: 2024-12-11
Payer: MEDICARE

## 2024-12-11 ENCOUNTER — TELEPHONE (OUTPATIENT)
Dept: PRIMARY CARE | Facility: CLINIC | Age: 79
End: 2024-12-11
Payer: MEDICARE

## 2024-12-11 PROCEDURE — 97112 NEUROMUSCULAR REEDUCATION: CPT

## 2024-12-11 PROCEDURE — 97110 THERAPEUTIC EXERCISES: CPT

## 2024-12-11 PROCEDURE — 97140 MANUAL THERAPY 1/> REGIONS: CPT

## 2024-12-11 NOTE — PROGRESS NOTES
progress   LTG 3 Patient will ambulate with LRD independently 150ft with improved bilateral step length and symmetry. Partially met, In progress   LTG 4 Ruano balance >/= 45/56 to decrease fall risk. Met   LTG 5 DGI >/= 15/24 to demonstrate improved dynamic balance. In progress            Plan:  Frequency/Duration:  Plan  Plan Frequency: 2  Plan weeks: 4  Current Treatment Recommendations: Strengthening, ROM, Balance training, Functional mobility training, Gait training, Stair training, Neuromuscular re-education, Home exercise program, Safety education & training, Patient/Caregiver education & training, Equipment evaluation, education, & procurement, Modalities  Modalities: Heat/Cold  Additional Comments: D/C next week  Pt to continue current HEP.  See objective section for any therapeutic exercise changes, additions or modifications this date.    Therapy Time:      PT Individual Minutes  Time In: 0804  Time Out: 0859  Minutes: 55  Timed Code Treatment Minutes: 54 Minutes  Procedure Minutes:0  Timed Activity Minutes Units   Ther Ex 29 2   Manual  5 1   Neuro Jonh 20 1     Electronically signed by Vickie Rogers PT on 12/11/24 at 7:58 AM EST

## 2024-12-11 NOTE — TELEPHONE ENCOUNTER
----- Message from Hernan Osborne sent at 12/9/2024  6:03 PM EST -----  Tell pt anemia improved.  Will recheck labs 3-4 mos

## 2024-12-13 ENCOUNTER — HOSPITAL ENCOUNTER (OUTPATIENT)
Dept: PHYSICAL THERAPY | Age: 79
Setting detail: THERAPIES SERIES
Discharge: HOME | End: 2024-12-13
Payer: MEDICARE

## 2024-12-13 PROCEDURE — 97110 THERAPEUTIC EXERCISES: CPT

## 2024-12-13 PROCEDURE — 97112 NEUROMUSCULAR REEDUCATION: CPT

## 2024-12-13 PROCEDURE — 97116 GAIT TRAINING THERAPY: CPT

## 2024-12-13 NOTE — PLAN OF CARE
[facility]  PHYSICAL THERAPY PLAN OF CARE                                    [facility] [address] [place] [address]     Ph: [phone]  Fax: [phone]    [] Certification  [] Recertification []  Plan of Care  [] Progress Note [x] Discharge      Referring Provider: Sascha Coelho DO    From:  Catrina Bergeron DPT  Patient: Terri Sanchez (79 y.o. female) : 1945 Date: 2024   Medical Diagnosis: Unspecified fracture of shaft of left femur, subsequent encounter for closed fracture with delayed healing [S72.302G]    Treatment Diagnosis: impaired gait, impaired LE strength    Plan of Care/Certification Expiration Date: 24   Progress Report Period from: 2024  to 2024    Visits to Date:  No Show: 0 Cancelled Appts: 0    OBJECTIVE:   Short Term Goals - Time Frame for Short Term Goals: 3 weeks    Goals Current/Discharge status  Status   Short Term Goal 1: Patient will be independent with HEP.  STG 1 Current Status:: : independent with HEP Independent   In progress   Long Term Goals - Time Frame for Long Term Goals : 4 weeks  Goals Current/ Discharge status Status   Long Term Goal 1: Patient will increase left knee flexion >/= 110 degrees and bilateral SLR >/= 70 degrees for improved functional tolerance. LTG 1 Current Status:: : Lt knee flexion = 120deg, Lt SLR=70 deg, Rt SLR=55deg   Met, Partially met   Long Term Goal 2: Patient will increase strength in bilateral LEs >/= 4+/5 for improved standing tolerance.       Strength RLE  R Hip Flexion: 4/5  R Hip ABduction: 3+/5  R Knee Flexion: 5/5  R Knee Extension: 5/5  R Ankle Dorsiflexion: 5/5  Strength LLE  L Hip Flexion: 4-/5  L Hip ABduction: 3/5  L Knee Flexion: 5/5  L Knee Extension: 5/5  L Ankle Dorsiflexion: 5/5 In progress   Long Term Goal 3: Patient will ambulate with LRD independently 150ft with improved bilateral step length and symmetry.     Ambulation  Surface: Carpet  Device: Small Base Quad

## 2024-12-13 NOTE — PROGRESS NOTES
[facility]  Outpatient Physical Therapy    Treatment Note        Date: 2024  Patient: Terri Sanchez  : 1945   Confirmed: Yes  MRN: [id number]  Referring Provider: Sascha Coelho DO    Medical Diagnosis: Unspecified fracture of shaft of left femur, subsequent encounter for closed fracture with delayed healing [O32.340U]       Treatment Diagnosis: impaired gait, impaired LE strength    Visit Information:  Insurance: Payor: [employer] MEDIC[employer] ADVANTAGE / Plan: MED[employer] ADVANTAGE PREFERRED PPO / Product Type: *No Product type* /   PT Visit Information  PT Insurance Information: Medical [place] Medicare  Total # of Visits Approved:  (BMN)  Total # of Visits to Date:   Plan of Care/Certification Expiration Date: 24  No Show: 0  Progress Note Due Date: 24  Canceled Appointment: 0  Progress Note Counter:     Subjective Information:  Subjective: Pt states her soreness is much better after being shown self massge LV.  HEP Compliance:  [x] Good [] Fair [] Poor [] Reports not doing due to:    Pain Screening  Patient Currently in Pain: Denies    Treatment:  Exercises:  Exercises  Exercise 1: UBE level 4.7 for 5 min  Exercise 2: Sidelying hip abd x 7 and SLR x10 focusing on eccentric control grecia  Exercise 6: Seated lateral hip flexion over ialyn 3# ankle weight x20  Exercise 8: DGI=16  Exercise 11: Cable columns walk outs 2 plates x3 ea direction  Exercise 12: Standing with 1 foot on 4\" box with and without single leg march  Exercise 14: Lateral step over 12inch ailyn x 10 to mimic bathtub, trialed 1# wt with difficulty  Exercise 17: 2 corner step taps on 6\" step x10 grecia  Exercise 19: 6\" step ups F/L x 10 ea  Exercise 20: HEP: continue with current     *Indicates exercise, modality, or manual techniques to be initiated when appropriate    Objective Measures:      Ambulation  Surface: Carpet  Device: Small Base Quad Cane  Assistance: Modified Independent,

## 2024-12-16 ENCOUNTER — OFFICE VISIT (OUTPATIENT)
Dept: ORTHOPEDIC SURGERY | Facility: CLINIC | Age: 79
End: 2024-12-16
Payer: MEDICARE

## 2024-12-16 ENCOUNTER — HOSPITAL ENCOUNTER (OUTPATIENT)
Dept: RADIOLOGY | Facility: CLINIC | Age: 79
Discharge: HOME | End: 2024-12-16
Payer: MEDICARE

## 2024-12-16 DIAGNOSIS — S72.302G CLOSED FRACTURE OF SHAFT OF LEFT FEMUR WITH DELAYED HEALING, UNSPECIFIED FRACTURE MORPHOLOGY, SUBSEQUENT ENCOUNTER: ICD-10-CM

## 2024-12-16 PROCEDURE — 73552 X-RAY EXAM OF FEMUR 2/>: CPT | Mod: LEFT SIDE | Performed by: STUDENT IN AN ORGANIZED HEALTH CARE EDUCATION/TRAINING PROGRAM

## 2024-12-16 PROCEDURE — 99213 OFFICE O/P EST LOW 20 MIN: CPT | Performed by: STUDENT IN AN ORGANIZED HEALTH CARE EDUCATION/TRAINING PROGRAM

## 2024-12-16 PROCEDURE — 73552 X-RAY EXAM OF FEMUR 2/>: CPT | Mod: LT

## 2024-12-16 PROCEDURE — 1123F ACP DISCUSS/DSCN MKR DOCD: CPT | Performed by: STUDENT IN AN ORGANIZED HEALTH CARE EDUCATION/TRAINING PROGRAM

## 2024-12-16 NOTE — PROGRESS NOTES
Chief Complaint   Patient presents with    Left Thigh - Follow-up     ORIF Lt femur fx  DOS: 5/23/24   Xrays today          History of Present Illness   Patient presents today for follow up s/p ORIF left femur fracture.  . No new numbness or tingling. No chest pain or shortness of breath. No calf pain.  Ambulating independently with a cane.    Feels like she is making significant progress doing well overall no issues currently.  Does have upcoming appointment with rheumatology to discuss treatment for osteoporosis this is in April       Review of Systems   GENERAL: Negative  GI: Negative  MUSCULOSKELETAL: See HPI  SKIN: Negative  NEURO:  Negative     Physical Exam:  side: left lower extremity:  Tenderness to palpation vivek-incisional  Incision healing well, No active drainage no discharge  Swelling / ecchymosis noted  DF/EHL/PF intact  SILT Foot  Good cap refill     Imaging      XR femur left 2+ views  Narrative: Interpreted By:  Zion Miller III,   STUDY:  XR FEMUR LEFT 2+ VIEWS; ;  12/16/2024 10:28 am      INDICATION:  Signs/Symptoms:pain.      ,S72.302G Unspecified fracture of shaft of left femur, subsequent  encounter for closed fracture with delayed healing      COMPARISON:  None.      ACCESSION NUMBER(S):  [id number]      ORDERING CLINICIAN:  ZION MILLER      FINDINGS:  Multiple views left femur: Status post open reduction internal  fixation periprosthetic femoral shaft fracture no signs of hardware  failure or loosening abundant bridging bone formation and callus  formation consistent with healing no signs of hardware failure or  lucency, prior left hip cephalomedullary nail appropriately placed  healed left intertrochanteric fracture with abundant bone formation.      Impression: Healing periprosthetic distal femur fracture          MACRO:  None      Signed by: Zion Miller III 12/16/2024 10:33 AM  Dictation workstation:   [id number]       Assessment   Patient is status post open reduction  internal fixation femur fracture periprosthetic 7 months out     Plan:  Overall doing well  Recommend outpatient based physical therapy range of motion as tolerated as well as working on strength  Discussed activities to avoid as well as importance of using pain as a guide  Follow-up in 4 to 5 months  X-rays at follow-up 2 views left femur

## 2024-12-20 DIAGNOSIS — I10 PRIMARY HYPERTENSION: ICD-10-CM

## 2024-12-24 RX ORDER — POTASSIUM CHLORIDE 750 MG/1
10 TABLET, EXTENDED RELEASE ORAL 2 TIMES DAILY
Qty: 180 TABLET | Refills: 1 | Status: SHIPPED | OUTPATIENT
Start: 2024-12-24

## 2024-12-26 ENCOUNTER — TELEPHONE (OUTPATIENT)
Dept: CARDIOLOGY | Facility: CLINIC | Age: 79
End: 2024-12-26
Payer: MEDICARE

## 2024-12-26 NOTE — TELEPHONE ENCOUNTER
Patient called office for a refill on her Potassium. Informed patient the medication was refilled on 12/24

## 2025-01-10 RX ORDER — CEPHALEXIN 500 MG/1
500 CAPSULE ORAL EVERY EVENING
Qty: 30 CAPSULE | Refills: 4 | Status: SHIPPED | OUTPATIENT
Start: 2025-01-10

## 2025-01-10 NOTE — TELEPHONE ENCOUNTER
Pt is calling need finishing up macrodantin , needs refill to restart the keflex as directed. Please sign and send if appropriate

## 2025-03-02 DIAGNOSIS — I10 PRIMARY HYPERTENSION: ICD-10-CM

## 2025-03-04 RX ORDER — HYDROCHLOROTHIAZIDE 12.5 MG/1
12.5 CAPSULE ORAL
Qty: 90 CAPSULE | Refills: 3 | Status: SHIPPED | OUTPATIENT
Start: 2025-03-04

## 2025-03-12 DIAGNOSIS — I10 PRIMARY HYPERTENSION: ICD-10-CM

## 2025-03-13 RX ORDER — METOPROLOL SUCCINATE 100 MG/1
100 TABLET, EXTENDED RELEASE ORAL DAILY
Qty: 90 TABLET | Refills: 1 | Status: SHIPPED | OUTPATIENT
Start: 2025-03-13

## 2025-03-26 DIAGNOSIS — E78.5 DYSLIPIDEMIA: ICD-10-CM

## 2025-03-26 DIAGNOSIS — I10 PRIMARY HYPERTENSION: ICD-10-CM

## 2025-03-27 RX ORDER — DILTIAZEM HYDROCHLORIDE 240 MG/1
240 CAPSULE, EXTENDED RELEASE ORAL DAILY
Qty: 90 CAPSULE | Refills: 0 | Status: SHIPPED | OUTPATIENT
Start: 2025-03-27

## 2025-03-27 RX ORDER — PRAVASTATIN SODIUM 40 MG/1
40 TABLET ORAL NIGHTLY
Qty: 90 TABLET | Refills: 0 | Status: SHIPPED | OUTPATIENT
Start: 2025-03-27

## 2025-04-07 RX ORDER — METHENAMINE HIPPURATE 1000 MG/1
TABLET ORAL
Qty: 60 TABLET | Refills: 0 | Status: SHIPPED | OUTPATIENT
Start: 2025-04-07

## 2025-04-14 ENCOUNTER — APPOINTMENT (OUTPATIENT)
Dept: PRIMARY CARE | Facility: CLINIC | Age: 80
End: 2025-04-14
Payer: MEDICARE

## 2025-04-14 VITALS
HEART RATE: 54 BPM | SYSTOLIC BLOOD PRESSURE: 150 MMHG | TEMPERATURE: 97.9 F | BODY MASS INDEX: 22.1 KG/M2 | WEIGHT: 112.6 LBS | DIASTOLIC BLOOD PRESSURE: 77 MMHG | HEIGHT: 60 IN

## 2025-04-14 DIAGNOSIS — Z78.9 NEVER SMOKED CIGARETTES: ICD-10-CM

## 2025-04-14 DIAGNOSIS — E78.5 DYSLIPIDEMIA: ICD-10-CM

## 2025-04-14 DIAGNOSIS — I10 PRIMARY HYPERTENSION: ICD-10-CM

## 2025-04-14 DIAGNOSIS — Z00.00 HEALTHCARE MAINTENANCE: ICD-10-CM

## 2025-04-14 DIAGNOSIS — M80.072A AGE-RELATED OSTEOPOROSIS WITH CURRENT PATHOLOGICAL FRACTURE, LEFT ANKLE AND FOOT, INITIAL ENCOUNTER FOR FRACTURE: ICD-10-CM

## 2025-04-14 DIAGNOSIS — E53.8 VITAMIN B12 DEFICIENCY: ICD-10-CM

## 2025-04-14 PROCEDURE — 1159F MED LIST DOCD IN RCRD: CPT | Performed by: FAMILY MEDICINE

## 2025-04-14 PROCEDURE — 1123F ACP DISCUSS/DSCN MKR DOCD: CPT | Performed by: FAMILY MEDICINE

## 2025-04-14 PROCEDURE — 99213 OFFICE O/P EST LOW 20 MIN: CPT | Performed by: FAMILY MEDICINE

## 2025-04-14 PROCEDURE — 1160F RVW MEDS BY RX/DR IN RCRD: CPT | Performed by: FAMILY MEDICINE

## 2025-04-14 PROCEDURE — 3078F DIAST BP <80 MM HG: CPT | Performed by: FAMILY MEDICINE

## 2025-04-14 PROCEDURE — 1036F TOBACCO NON-USER: CPT | Performed by: FAMILY MEDICINE

## 2025-04-14 PROCEDURE — 3077F SYST BP >= 140 MM HG: CPT | Performed by: FAMILY MEDICINE

## 2025-04-14 PROCEDURE — 1158F ADVNC CARE PLAN TLK DOCD: CPT | Performed by: FAMILY MEDICINE

## 2025-04-14 RX ORDER — CEPHALEXIN 250 MG/1
250 CAPSULE ORAL DAILY
COMMUNITY
Start: 2024-04-24

## 2025-04-14 RX ORDER — PRAVASTATIN SODIUM 40 MG/1
40 TABLET ORAL NIGHTLY
Qty: 90 TABLET | Refills: 3 | Status: SHIPPED | OUTPATIENT
Start: 2025-04-14

## 2025-04-14 RX ORDER — IBUPROFEN 200 MG
1 CAPSULE ORAL 2 TIMES DAILY
COMMUNITY

## 2025-04-14 ASSESSMENT — ENCOUNTER SYMPTOMS
RESPIRATORY NEGATIVE: 1
HEMATOLOGIC/LYMPHATIC NEGATIVE: 1
ALLERGIC/IMMUNOLOGIC NEGATIVE: 1
EYES NEGATIVE: 1
CONSTITUTIONAL NEGATIVE: 1
GASTROINTESTINAL NEGATIVE: 1
ENDOCRINE NEGATIVE: 1
CARDIOVASCULAR NEGATIVE: 1
PSYCHIATRIC NEGATIVE: 1
ARTHRALGIAS: 1

## 2025-04-14 ASSESSMENT — PATIENT HEALTH QUESTIONNAIRE - PHQ9
1. LITTLE INTEREST OR PLEASURE IN DOING THINGS: NOT AT ALL
2. FEELING DOWN, DEPRESSED OR HOPELESS: NOT AT ALL
SUM OF ALL RESPONSES TO PHQ9 QUESTIONS 1 AND 2: 0

## 2025-04-14 NOTE — PROGRESS NOTES
Subjective and is here for a follow-up on her hypertension hyperlipidemia and vitamin B12 deficiency.  She states that overall she has been doing well.  She still does get occasional left hip pain but is able to ambulate with the use of a cane.  Her son who lives with her does most of the housework and laundry.  She had seen [facility] rheumatology and evaluation for osteoporosis is in progress.  She continues on her other meds noted.  She has no other complaints.    Patient ID: Shabana Adan is a 79 y.o. female who presents for Follow-up (Med follow up):    Problem List Items Addressed This Visit    None     Past Medical History:   Diagnosis Date    Body mass index (BMI) 24.0-24.9, adult 05/09/2022    Body mass index (BMI) of 24.0 to 24.9 in adult    Body mass index (BMI) 24.0-24.9, adult 11/08/2021    BMI 24.0-24.9, adult    Disorder of pigmentation, unspecified 11/05/2021    Discoloration of skin of face    Personal history of diseases of the blood and blood-forming organs and certain disorders involving the immune mechanism 11/05/2021    History of anemia    Personal history of other benign neoplasm 05/09/2022    History of other benign neoplasm      Past Surgical History:   Procedure Laterality Date    FEMUR FRACTURE SURGERY Left 05/22/2024    OTHER SURGICAL HISTORY  11/05/2021    Lumpectomy    OTHER SURGICAL HISTORY  11/05/2021    Hysterectomy    OTHER SURGICAL HISTORY  11/05/2021    Wrist surgery    OTHER SURGICAL HISTORY  11/05/2021    Cataract surgery    OTHER SURGICAL HISTORY  11/05/2021    Leg surgery    OTHER SURGICAL HISTORY  11/08/2021    Colonoscopy    XR CHEST PACEMAKER WITH FLUORO  06/24/2019    XR CHEST PACEMAKER WITH FLUORO 6/24/2019 [facility] CL[facility]ICAL [facility]GAC[facility]      Family History   Problem Relation Name Age of Onset    COPD Brother      Cancer Brother        Social History     Socioeconomic History    Marital status:      Spouse name: Not on file    Number of children: Not on file    Years of education:  Not on file    Highest education level: Not on file   Occupational History    Not on file   Tobacco Use    Smoking status: Never    Smokeless tobacco: Never   Substance and Sexual Activity    Alcohol use: Never     Comment: holidays    Drug use: Never    Sexual activity: Not on file   Other Topics Concern    Not on file   Social History Narrative    Not on file     Social Drivers of Health     Financial Resource Strain: Low Risk  (7/25/2021)    [facility]    Overall Financial Resource Strain (CARDIA)     Difficulty of Paying Living Expenses: Not very hard   Food Insecurity: No Food Insecurity (5/23/2024)    [facility]    Hunger Vital Sign     Worried About Running Out of Food in the Last Year: Never true     Ran Out of Food in the Last Year: Never true   Transportation Needs: No Transportation Needs (5/23/2024)    [facility] from [facility]    PRAPARE - Transportation     Lack of Transportation (Medical): No     Lack of Transportation (Non-Medical): No   Physical Activity: Not on file   Stress: Not on file   Social Connections: Not on file   Intimate Partner Violence: Not on file   Housing Stability: Low Risk  (5/23/2024)    [facility]    Housing Stability Vital Sign     Unable to Pay for Housing in the Last Year: No     Number of Places Lived in the Last Year: 1     Unstable Housing in the Last Year: No      Codeine, Hydrocodone-acetaminophen, Metronidazole, Sulfamethoxazole-trimethoprim, Ibuprofen, and Latex   Current Outpatient Medications   Medication Sig Dispense Refill    aspirin 81 mg EC tablet Take 1 tablet (81 mg) by mouth once daily.      calcium citrate (Calcitrate) 200 mg (950 mg) tablet Take 1 tablet (200 mg) by mouth 2 times a day.       cephalexin (Keflex) 250 mg capsule Take 1 capsule (250 mg) by mouth once daily.      cholecalciferol (Vitamin D-3) 50 mcg (2,000 unit) capsule Take 1 capsule (2,000 Units) by mouth early in the morning..      cyanocobalamin (Vitamin B-12) 1,000 mcg tablet Take 1 tablet (1,000 mcg) by mouth once daily.      DILT- mg 24 hr capsule TAKE ONE CAPSULE BY MOUTH EVERY DAY 90 capsule 0    hydroCHLOROthiazide (Microzide) 12.5 mg capsule TAKE ONE CAPSULE BY MOUTH IN THE MORNING 90 capsule 3    metoprolol succinate XL (Toprol-XL) 100 mg 24 hr tablet TAKE ONE TABLET BY MOUTH EVERY DAY 90 tablet 1    potassium chloride CR 10 mEq ER tablet TAKE ONE TABLET BY MOUTH TWO TIMES A  tablet 1    pravastatin (Pravachol) 40 mg tablet TAKE ONE TABLET BY MOUTH EVERY DAY AT BEDTIME 90 tablet 0    calcium carbonate-vitamin D3 600 mg-20 mcg (800 unit) tablet Take 1 tablet by mouth once daily. (Patient not taking: Reported on 4/14/2025)      methenamine hippurate (Hiprex) 1 gram tablet Take 0.5 tablets (0.5 g) by mouth once daily. (Patient not taking: Reported on 4/14/2025)      nitrofurantoin, macrocrystal-monohydrate, (Macrobid) 100 mg capsule Take 1 capsule (100 mg) by mouth once daily. (Patient not taking: Reported on 4/14/2025)       No current facility-administered medications for this visit.       Immunization History   Administered Date(s) Administered    Flu vaccine (IIV4), preservative free *Check age/dose* 09/26/2018    Flu vaccine, quadrivalent, high-dose, preservative free, age 65y+ (FLUZONE) 10/13/2020    Flu vaccine, trivalent, preservative free, HIGH-DOSE, age 65y+ (Fluzone) 10/29/2015, 11/17/2017, 09/24/2019, 10/13/2020, 11/13/2024    Influenza, Seasonal, Quadrivalent, Adjuvanted 10/13/2021, 10/17/2022, 10/23/2023    Influenza, Unspecified 10/01/2015, 09/27/2016, 10/01/2022    Moderna COVID-19 vaccine, 12 years and older (50mcg/0.5mL)(Spikevax) 11/21/2023    Moderna COVID-19 vaccine, bivalent, blue cap/gray label  *Check age/dose* 10/29/2022    Moderna SARS-CoV-2 Vaccination 03/04/2021, 04/01/2021, 12/18/2021, 06/03/2022    Pneumococcal conjugate vaccine, 13-valent (PREVNAR 13) 07/14/2017    Pneumococcal polysaccharide vaccine, 23-valent, age 2 years and older (PNEUMOVAX 23) 07/01/2019        Review of Systems   Constitutional: Negative.    HENT: Negative.     Eyes: Negative.    Respiratory: Negative.     Cardiovascular: Negative.    Gastrointestinal: Negative.    Endocrine: Negative.    Genitourinary: Negative.    Musculoskeletal:  Positive for arthralgias and gait problem.   Skin: Negative.    Allergic/Immunologic: Negative.    Hematological: Negative.    Psychiatric/Behavioral: Negative.     All other systems reviewed and are negative.       Vitals:    04/14/25 1339   BP: 150/77   Pulse: 54   Temp: 36.6 °C (97.9 °F)     Vitals:    04/14/25 1339   Weight: 51.1 kg (112 lb 9.6 oz)      Physical Exam  Constitutional:       General: She is not in acute distress.     Appearance: Normal appearance.   Cardiovascular:      Rate and Rhythm: Normal rate and regular rhythm.      Pulses: Normal pulses.      Heart sounds: Normal heart sounds.   Pulmonary:      Effort: Pulmonary effort is normal. No respiratory distress.      Breath sounds: Normal breath sounds. No wheezing or rales.   Neurological:      General: No focal deficit present.      Mental Status: She is alert and oriented to person, place, and time. Mental status is at baseline.   Psychiatric:         Mood and Affect: Mood normal.         Thought Content: Thought content normal.         Judgment: Judgment normal.          ASSESSMENT/PLAN: Hypertension with slightly elevated reading.  Continue current meds as noted.    Osteoporosis.  Follow-up with rheumatology for initiation of treatment of this.  Status post left femur fracture followed by orthopedics.  We again discussed the importance of fall prevention.  Check vitamin D level and CBC    Hyperlipidemia.  Check CMP and  lipid profile    History of vitamin B12 deficiency.  Check vitamin B12 level.    Needs immunization update including Shingrix and RSV  Follow-up in 6 months and call as needed     Scribe Attestation  By signing my name below, I, Sara Richter LPN, Scribe   attest that this documentation has been prepared under the direction and in the presence of Hernan Osborne MD.

## 2025-04-21 ENCOUNTER — OFFICE VISIT (OUTPATIENT)
Dept: ORTHOPEDIC SURGERY | Facility: CLINIC | Age: 80
End: 2025-04-21
Payer: MEDICARE

## 2025-04-21 ENCOUNTER — HOSPITAL ENCOUNTER (OUTPATIENT)
Dept: RADIOLOGY | Facility: CLINIC | Age: 80
Discharge: HOME | End: 2025-04-21
Payer: MEDICARE

## 2025-04-21 DIAGNOSIS — S72.302G CLOSED FRACTURE OF SHAFT OF LEFT FEMUR WITH DELAYED HEALING, UNSPECIFIED FRACTURE MORPHOLOGY, SUBSEQUENT ENCOUNTER: Primary | ICD-10-CM

## 2025-04-21 DIAGNOSIS — S72.302G CLOSED FRACTURE OF SHAFT OF LEFT FEMUR WITH DELAYED HEALING, UNSPECIFIED FRACTURE MORPHOLOGY, SUBSEQUENT ENCOUNTER: ICD-10-CM

## 2025-04-21 PROCEDURE — 73552 X-RAY EXAM OF FEMUR 2/>: CPT | Mod: LEFT SIDE | Performed by: STUDENT IN AN ORGANIZED HEALTH CARE EDUCATION/TRAINING PROGRAM

## 2025-04-21 PROCEDURE — 99213 OFFICE O/P EST LOW 20 MIN: CPT | Performed by: STUDENT IN AN ORGANIZED HEALTH CARE EDUCATION/TRAINING PROGRAM

## 2025-04-21 PROCEDURE — 1123F ACP DISCUSS/DSCN MKR DOCD: CPT | Performed by: STUDENT IN AN ORGANIZED HEALTH CARE EDUCATION/TRAINING PROGRAM

## 2025-04-21 PROCEDURE — 73552 X-RAY EXAM OF FEMUR 2/>: CPT | Mod: LT

## 2025-04-21 NOTE — PROGRESS NOTES
Chief Complaint   Patient presents with    Left Thigh - Follow-up     ORIF Lt femur fx  DOS: 5/23/24   Xrays today          History of Present Illness   Patient presents today for follow up s/p ORIF left femur fracture.  . No new numbness or tingling. No chest pain or shortness of breath. No calf pain.  Ambulating independently with a cane.         Review of Systems   GENERAL: Negative  GI: Negative  MUSCULOSKELETAL: See HPI  SKIN: Negative  NEURO:  Negative     Physical Exam:  side: left lower extremity:  Tenderness to palpation vivek-incisional  Incision healing well, No active drainage no discharge  Swelling / ecchymosis noted  DF/EHL/PF intact  SILT Foot  Good cap refill     Imaging      XR femur left 2+ views  Narrative: Interpreted By:  Zion Miller III,   STUDY:  XR FEMUR LEFT 2+ VIEWS; ;  4/21/2025 11:12 am      INDICATION:  Signs/Symptoms:pain.      ,S72.302G Unspecified fracture of shaft of left femur, subsequent  encounter for closed fracture with delayed healing      COMPARISON:  None.      ACCESSION NUMBER(S):  [id number]      ORDERING CLINICIAN:  ZION MILLER      FINDINGS:  Multiple views left femur: Healed periprosthetic distal femur  fracture in near anatomic alignment there is abundant bridging bone  formation consistent with a healed periprosthetic fracture. No signs  of hardware failure or loosening prior left hip cephalomedullary nail  appropriately placed no vivek-implant fracture.      Impression: Healed periprosthetic distal femur fracture          MACRO:  None      Signed by: Zion Miller III 4/21/2025 11:17 AM  Dictation workstation:   [id number]       Assessment   Patient is status post open reduction internal fixation femur fracture periprosthetic 11 months out     Plan:  Overall doing well  X-rays indicate a healed periprosthetic femur fracture  Discussed activities to avoid as well as importance of using pain as a guide  She will follow-up as needed  Discussed importance of fall  prevention  Do recommend ambulation with a cane

## 2025-04-22 LAB
25(OH)D3+25(OH)D2 SERPL-MCNC: 65 NG/ML (ref 30–100)
CHOLEST SERPL-MCNC: 208 MG/DL
CHOLEST/HDLC SERPL: 2.7 (CALC)
ERYTHROCYTE [DISTWIDTH] IN BLOOD BY AUTOMATED COUNT: 12.3 % (ref 11–15)
HCT VFR BLD AUTO: 42.7 % (ref 35–45)
HDLC SERPL-MCNC: 78 MG/DL
HGB BLD-MCNC: 14.2 G/DL (ref 11.7–15.5)
LDLC SERPL CALC-MCNC: 107 MG/DL (CALC)
MCH RBC QN AUTO: 31 PG (ref 27–33)
MCHC RBC AUTO-ENTMCNC: 33.3 G/DL (ref 32–36)
MCV RBC AUTO: 93.2 FL (ref 80–100)
NONHDLC SERPL-MCNC: 130 MG/DL (CALC)
PLATELET # BLD AUTO: 262 THOUSAND/UL (ref 140–400)
PMV BLD REES-ECKER: 9.9 FL (ref 7.5–12.5)
RBC # BLD AUTO: 4.58 MILLION/UL (ref 3.8–5.1)
TRIGL SERPL-MCNC: 120 MG/DL
VIT B12 SERPL-MCNC: 1486 PG/ML (ref 200–1100)
WBC # BLD AUTO: 6.5 THOUSAND/UL (ref 3.8–10.8)

## 2025-05-06 ENCOUNTER — OFFICE VISIT (OUTPATIENT)
Dept: OBGYN CLINIC | Age: 80
End: 2025-05-06
Payer: MEDICARE

## 2025-05-06 VITALS
BODY MASS INDEX: 22.19 KG/M2 | WEIGHT: 113 LBS | HEART RATE: 60 BPM | SYSTOLIC BLOOD PRESSURE: 132 MMHG | HEIGHT: 60 IN | DIASTOLIC BLOOD PRESSURE: 64 MMHG

## 2025-05-06 DIAGNOSIS — N39.0 RECURRENT UTI: Primary | ICD-10-CM

## 2025-05-06 PROCEDURE — 1159F MED LIST DOCD IN RCRD: CPT | Performed by: OBSTETRICS & GYNECOLOGY

## 2025-05-06 PROCEDURE — 1123F ACP DISCUSS/DSCN MKR DOCD: CPT | Performed by: OBSTETRICS & GYNECOLOGY

## 2025-05-06 PROCEDURE — 99213 OFFICE O/P EST LOW 20 MIN: CPT | Performed by: OBSTETRICS & GYNECOLOGY

## 2025-05-06 RX ORDER — NITROFURANTOIN MACROCRYSTALS 50 MG/1
50 CAPSULE ORAL NIGHTLY
Qty: 60 CAPSULE | Refills: 3 | Status: SHIPPED | OUTPATIENT
Start: 2025-05-06 | End: 2026-01-01

## 2025-05-06 RX ORDER — METHENAMINE HIPPURATE 1000 MG/1
1 TABLET ORAL 2 TIMES DAILY
Qty: 60 TABLET | Refills: 5 | Status: SHIPPED | OUTPATIENT
Start: 2025-05-06

## 2025-05-12 NOTE — PROGRESS NOTES
Terri Sanchez is a 79 y.o. female who presents here today for complaints of Discuss Medications        History of Present Illness  The patient is an 79-year-old female who presents for evaluation of osteoporosis and urinary tract infection.    She was diagnosed with severe osteoporosis and has been advised to avoid dairy products and citrus juices while on Hiprex, a medication prescribed for her condition. She is concerned about potential interactions between Hiprex and her dietary intake, particularly in relation to calcium absorption. Her last prescription for Hiprex  in 2025, and she has not had it refilled. She has been consuming orange juice daily and taking additional calcium supplements. Her most recent blood test revealed slightly elevated calcium levels, but her healthcare providers have reassured her that this is not a cause for concern at present. She has been under the care of a rheumatologist for the past 7 months, who has recommended a trial of Forteo injections for a duration of 2 years. However, she is hesitant to proceed with this treatment due to financial constraints and fear of potential side effects, including damage to her femur or jaw. She also expresses anxiety about the risk of falls. She has been using a stairlift at home to prevent falls.    She believes she may have been on Keflex longer than recommended, as she was advised to switch antibiotics every 4 months. She reports increased urinary frequency at night, which she attributes to not taking Hiprex for the past month. She was previously on Macrodantin during her stay at a nursing home until 2024, after which she transitioned to Keflex.         Vitals:  /64 (BP Site: Left Upper Arm, Patient Position: Sitting, BP Cuff Size: Medium Adult)   Pulse 60   Ht 1.524 m (5')   Wt 51.3 kg (113 lb)   LMP 1996   BMI 22.07 kg/m²   Allergies:  Latex, Bactrim [sulfamethoxazole-trimethoprim], Codeine, Vicodin

## 2025-06-25 DIAGNOSIS — I10 PRIMARY HYPERTENSION: ICD-10-CM

## 2025-06-26 RX ORDER — POTASSIUM CHLORIDE 750 MG/1
10 TABLET, EXTENDED RELEASE ORAL 2 TIMES DAILY
Qty: 180 TABLET | Refills: 1 | Status: SHIPPED | OUTPATIENT
Start: 2025-06-26

## 2025-06-27 DIAGNOSIS — I10 PRIMARY HYPERTENSION: ICD-10-CM

## 2025-06-27 RX ORDER — DILTIAZEM HYDROCHLORIDE 240 MG/1
240 CAPSULE, EXTENDED RELEASE ORAL DAILY
Qty: 90 CAPSULE | Refills: 1 | Status: SHIPPED | OUTPATIENT
Start: 2025-06-27

## 2025-10-16 ENCOUNTER — APPOINTMENT (OUTPATIENT)
Dept: PRIMARY CARE | Facility: CLINIC | Age: 80
End: 2025-10-16
Payer: MEDICARE

## (undated) DEVICE — MEDI-VAC YANKAUER SUCTION HANDLE W/BULBOUS TIP: Brand: CARDINAL HEALTH

## (undated) DEVICE — Device: Brand: ENDO SMARTCAP

## (undated) DEVICE — GLOVE SURG SZ 9 THK91MIL LTX FREE SYN POLYISOPRENE ANTI

## (undated) DEVICE — COUNTER NDL 40 COUNT HLD 70 FOAM BLK ADH W/ MAG

## (undated) DEVICE — SUTURE VCRL SZ 2-0 L27IN ABSRB UD L26MM SH 1/2 CIR J417H

## (undated) DEVICE — SYRINGE IRRIG 60ML SFT PLIABLE BLB EZ TO GRP 1 HND USE W/

## (undated) DEVICE — SHEET,DRAPE,53X77,STERILE: Brand: MEDLINE

## (undated) DEVICE — YANKAUER,SMOOTH HANDLE,HIGH CAPACITY: Brand: MEDLINE INDUSTRIES, INC.

## (undated) DEVICE — 1200CC COLLECTION UNIT 6MM X 6' PATIENT: Brand: MEDI-VAC

## (undated) DEVICE — GLOVE SURG SZ 85 STD WHT LTX SYN POLYMER BEAD REINF ANTI RL

## (undated) DEVICE — COVER,TABLE,44X90,STERILE: Brand: MEDLINE

## (undated) DEVICE — PADDING CAST W6INXL4YD RAYON UNDERCAST SOF-ROL

## (undated) DEVICE — SCREW BNE L14MM DIA5MM CNDYL S STL ST VAR ANG LOK COMPR T25
Type: IMPLANTABLE DEVICE | Site: FEMUR | Status: NON-FUNCTIONAL
Removed: 2024-05-23

## (undated) DEVICE — STOCKINETTE,IMPERVIOUS,12X48,STERILE: Brand: MEDLINE

## (undated) DEVICE — MARKER SURG SKIN GENTIAN VLT REG TIP W/ 6IN RUL DYNJSM01

## (undated) DEVICE — BRUSH CLEANING ENDOSCOPE CHAN DISP

## (undated) DEVICE — TOWEL,OR,DSP,ST,BLUE,STD,4/PK,20PK/CS: Brand: MEDLINE

## (undated) DEVICE — PAD,ABDOMINAL,8"X10",ST,LF: Brand: MEDLINE

## (undated) DEVICE — VCARE MEDIUM, UTERINE MANIPULATOR, VAGINAL-CERVICAL-AHLUWALIA'S-RETRACTOR-ELEVATOR: Brand: VCARE

## (undated) DEVICE — TUBING, SUCTION, 9/32" X 12', STRAIGHT: Brand: MEDLINE INDUSTRIES, INC.

## (undated) DEVICE — SUTURE VICRYL SZ 2-0 L36IN ABSRB UD L36MM CT-1 1/2 CIR J945H

## (undated) DEVICE — KIT ARMOR C DRP COLLAPSIBLE AND SELF EXP TOP CVR FOR FLUOROSCOPIC

## (undated) DEVICE — Device

## (undated) DEVICE — CHLORAPREP 26ML ORANGE

## (undated) DEVICE — SYRINGE MED 10ML LUERLOCK TIP W/O SFTY DISP

## (undated) DEVICE — HIGH FLOW TIP

## (undated) DEVICE — BLADE,CARBON-STEEL,10,STRL,DISPOSABLE,TB: Brand: MEDLINE

## (undated) DEVICE — T-DRAPE,EXTREMITY,STERILE: Brand: MEDLINE

## (undated) DEVICE — SPONGE,LAP,18"X18",DLX,XR,ST,5/PK,40/PK: Brand: MEDLINE

## (undated) DEVICE — BIT DRL L180MM DIA4.3MM QUIK CPL W/O STP REUSE

## (undated) DEVICE — SUTURE ABSORBABLE BRAIDED 2-0 CT-1 27 IN UD VICRYL J259H

## (undated) DEVICE — FAN SPRAY KIT: Brand: PULSAVAC®

## (undated) DEVICE — SUTURE VCRL SZ 0 L36IN ABSRB UD L36MM CT-1 1/2 CIR J946H

## (undated) DEVICE — GLOVE SURG SZ 85 L12IN FNGR THK94MIL TRNSLUC YEL LTX

## (undated) DEVICE — CONMED SCOPE SAVER BITE BLOCK, 20X27 MM: Brand: SCOPE SAVER

## (undated) DEVICE — Z DISCONTINUED NO SUB IDED PACKING VAG W2XL180IN 28X24 MESH 4 PLY SURG GZ FAN FLD RADPQ

## (undated) DEVICE — NEEDLE INSUF L120MM ULT VERES ENDOPATH

## (undated) DEVICE — 3M™ STERI-DRAPE™ INSTRUMENT POUCH 1018: Brand: STERI-DRAPE™

## (undated) DEVICE — SUTURE MCRYL SZ 4-0 L27IN ABSRB UD L19MM PS-2 1/2 CIR PRIM Y426H

## (undated) DEVICE — GOWN,SIRUS,POLYRNF,BRTHSLV,XLN/XL,20/CS: Brand: MEDLINE

## (undated) DEVICE — GAUZE,SPONGE,4"X4",16PLY,XRAY,STRL,LF: Brand: MEDLINE

## (undated) DEVICE — PACK,ORTHOPEDIC I: Brand: MEDLINE

## (undated) DEVICE — INSUFFLATION TUBING SET, WITH FILTER: Brand: CONMED

## (undated) DEVICE — SCREW BNE L12MM DIA5MM CNDYL S STL ST VAR ANG LOK COMPR T25
Type: IMPLANTABLE DEVICE | Site: FEMUR | Status: NON-FUNCTIONAL
Removed: 2024-05-23

## (undated) DEVICE — ADAPTER FLSH PMP FLD MGMT GI IRRIG OFP 2 DISPOSABLE

## (undated) DEVICE — SUTURE VICRYL SZ 0 L36IN ABSRB UD L36MM CT-1 1/2 CIR J946H

## (undated) DEVICE — SUTURE VCRL SZ 0 L27IN ABSRB UD SH L26MM 1/2 CIR TAPERPOINT J418H

## (undated) DEVICE — DRESSING HYDROFIBER AQUACEL AG ADVANTAGE 3.5X6 IN

## (undated) DEVICE — TROCAR ENDOSCP L100MM DIA5MM BLDELSS STBL SL OBT RADLUC

## (undated) DEVICE — DRAPE, LAVH, STERILE: Brand: MEDLINE

## (undated) DEVICE — PAD,SANITARY,11 IN,MAXI,W/WINGS,N-STRL: Brand: MEDLINE

## (undated) DEVICE — KIT,ANTI FOG,W/SPONGE & FLUID,SOFT PACK: Brand: MEDLINE

## (undated) DEVICE — 450 ML BOTTLE OF 0.05% CHLORHEXIDINE GLUCONATE IN 99.95% STERILE WATER FOR IRRIGATION, USP AND APPLICATOR.: Brand: IRRISEPT ANTIMICROBIAL WOUND LAVAGE

## (undated) DEVICE — TRAY PREP DRY W/ PREM GLV 2 APPL 6 SPNG 2 UNDPD 1 OVERWRAP

## (undated) DEVICE — ALCOHOL RUBBING ISO 16OZ 70%

## (undated) DEVICE — BIT DRL L145MM DIA3.2MM QUIK CPL W/O STP REUSE

## (undated) DEVICE — ENDO CARRY-ON PROCEDURE KIT: Brand: ENDO CARRY-ON PROCEDURE KIT

## (undated) DEVICE — ELECTROSURGICAL PENCIL BUTTON SWITCH E-Z CLEAN COATED BLADE ELECTRODE 10 FT (3 M) CORD HOLSTER: Brand: MEGADYNE

## (undated) DEVICE — PATIENT RETURN ELECTRODE, SINGLE-USE, CONTACT QUALITY MONITORING, ADULT, WITH 9FT CORD, FOR PATIENTS WEIGING OVER 33LBS. (15KG): Brand: MEGADYNE

## (undated) DEVICE — TUBE SET 96 MM 64 MM H2O PERISTALTIC STD AUX CHANNEL

## (undated) DEVICE — BLADE,CARBON-STEEL,15,STRL,DISPOSABLE,TB: Brand: MEDLINE

## (undated) DEVICE — WARMER LAPSCP BST 2 STG STRL DISP HEAT BLU

## (undated) DEVICE — GLOVE SURG SZ 8 L12IN FNGR THK79MIL GRN LTX FREE

## (undated) DEVICE — TUBE EVACUATOR SMOKE ST

## (undated) DEVICE — COVER LT HNDL BLU PLAS

## (undated) DEVICE — NEEDLE HYPO 22GA L1 1/2IN PIVOTING SHLD FOR LUERLOCK SYR

## (undated) DEVICE — DRESSING HYDROFIBER AQUACEL AG ADVANTAGE 3.5X12 IN

## (undated) DEVICE — TUBING, SUCTION, 1/4" X 10', STRAIGHT: Brand: MEDLINE

## (undated) DEVICE — SINGLE PORT MANIFOLD: Brand: NEPTUNE 2

## (undated) DEVICE — SUTURE CAPTURING DEVICE: Brand: CAPIO SLIM

## (undated) DEVICE — MATTRESS OVERLAY EGGCRATE 72X33IN FOAM PAD

## (undated) DEVICE — GOWN,AURORA,NONREINFORCED,LARGE: Brand: MEDLINE

## (undated) DEVICE — MARKER SURG SKIN GENTIAN VLT REG TIP W/ 6IN RUL

## (undated) DEVICE — HANDPIECE SET WITH SUCTION TUBING: Brand: INTERPULSE

## (undated) DEVICE — PACK,SET UP,DRAPE: Brand: MEDLINE

## (undated) DEVICE — TOTAL TRAY, DB, 100% SILI FOLEY, 16FR 10: Brand: MEDLINE

## (undated) DEVICE — INTENDED FOR TISSUE SEPARATION, AND OTHER PROCEDURES THAT REQUIRE A SHARP SURGICAL BLADE TO PUNCTURE OR CUT.: Brand: BARD-PARKER ® CARBON RIB-BACK BLADES

## (undated) DEVICE — SHEARS ENDOSCP L36CM DIA5MM ULTRASONIC CRV TIP W/ ADV

## (undated) DEVICE — BANDAGE COBAN 4 IN COMPR W4INXL5YD FOAM COHESIVE QUIK STK SELF ADH SFT

## (undated) DEVICE — TROCAR ENDOSCP L100MM DIA12MM BLDELSS OBT RADLUC STBL SL

## (undated) DEVICE — HYPODERMIC SAFETY NEEDLE: Brand: MAGELLAN

## (undated) DEVICE — LUBRICANT SURG JELLY ST BACTER TUBE 4.25OZ

## (undated) DEVICE — SPONGE GZ W4XL4IN COT 12 PLY TYP VII WVN C FLD DSGN STERILE

## (undated) DEVICE — LITHOTOMY DRAPE WITH FLUID CONTROL POUCH: Brand: CONVERTORS

## (undated) DEVICE — SONY PRINTER PAPER

## (undated) DEVICE — GLOVE ORANGE PI 7 1/2   MSG9075

## (undated) DEVICE — LABEL MED MINI W/ MARKER

## (undated) DEVICE — CYSTO/BLADDER IRRIGATION SET, REGULATING CLAMP

## (undated) DEVICE — NEPTUNE E-SEP SMOKE EVACUATION PENCIL, COATED, 70MM BLADE, PUSH BUTTON SWITCH: Brand: NEPTUNE E-SEP

## (undated) DEVICE — ELECTRODE ES L275IN 275IN BLDE TIP COAT PTFE TEF W EVAC

## (undated) DEVICE — DRESSING GZ W1XL8IN COT XRFRM N ADH OVERWRAP CURAD